# Patient Record
Sex: FEMALE | Race: WHITE | NOT HISPANIC OR LATINO | Employment: OTHER | URBAN - METROPOLITAN AREA
[De-identification: names, ages, dates, MRNs, and addresses within clinical notes are randomized per-mention and may not be internally consistent; named-entity substitution may affect disease eponyms.]

---

## 2024-04-03 ENCOUNTER — HOSPITAL ENCOUNTER (INPATIENT)
Facility: HOSPITAL | Age: 64
LOS: 1 days | Discharge: HOME | DRG: 180 | End: 2024-04-06
Attending: EMERGENCY MEDICINE | Admitting: INTERNAL MEDICINE
Payer: COMMERCIAL

## 2024-04-03 ENCOUNTER — APPOINTMENT (OUTPATIENT)
Dept: GENERAL RADIOLOGY | Facility: HOSPITAL | Age: 64
DRG: 180 | End: 2024-04-03
Payer: COMMERCIAL

## 2024-04-03 ENCOUNTER — APPOINTMENT (OUTPATIENT)
Dept: CT IMAGING | Facility: HOSPITAL | Age: 64
DRG: 180 | End: 2024-04-03
Payer: COMMERCIAL

## 2024-04-03 DIAGNOSIS — R91.8 LUNG MASS: Primary | ICD-10-CM

## 2024-04-03 DIAGNOSIS — R09.02 HYPOXIA: ICD-10-CM

## 2024-04-03 LAB
ALBUMIN SERPL-MCNC: 4.2 G/DL (ref 3.5–5.2)
ALBUMIN/GLOB SERPL: 1.5 G/DL
ALP SERPL-CCNC: 80 U/L (ref 39–117)
ALT SERPL W P-5'-P-CCNC: 15 U/L (ref 1–33)
ANION GAP SERPL CALCULATED.3IONS-SCNC: 9.9 MMOL/L (ref 5–15)
AST SERPL-CCNC: 22 U/L (ref 1–32)
BASOPHILS # BLD AUTO: 0.02 10*3/MM3 (ref 0–0.2)
BASOPHILS NFR BLD AUTO: 0.2 % (ref 0–1.5)
BILIRUB SERPL-MCNC: 0.6 MG/DL (ref 0–1.2)
BUN SERPL-MCNC: 19 MG/DL (ref 8–23)
BUN/CREAT SERPL: 18.1 (ref 7–25)
CALCIUM SPEC-SCNC: 9.5 MG/DL (ref 8.6–10.5)
CHLORIDE SERPL-SCNC: 102 MMOL/L (ref 98–107)
CO2 SERPL-SCNC: 22.1 MMOL/L (ref 22–29)
CREAT SERPL-MCNC: 1.05 MG/DL (ref 0.57–1)
DEPRECATED RDW RBC AUTO: 46.2 FL (ref 37–54)
EGFRCR SERPLBLD CKD-EPI 2021: 60.2 ML/MIN/1.73
EOSINOPHIL # BLD AUTO: 0.04 10*3/MM3 (ref 0–0.4)
EOSINOPHIL NFR BLD AUTO: 0.4 % (ref 0.3–6.2)
ERYTHROCYTE [DISTWIDTH] IN BLOOD BY AUTOMATED COUNT: 14 % (ref 12.3–15.4)
FLUAV SUBTYP SPEC NAA+PROBE: NOT DETECTED
FLUBV RNA ISLT QL NAA+PROBE: NOT DETECTED
GLOBULIN UR ELPH-MCNC: 2.8 GM/DL
GLUCOSE SERPL-MCNC: 131 MG/DL (ref 65–99)
HCT VFR BLD AUTO: 41.3 % (ref 34–46.6)
HGB BLD-MCNC: 13 G/DL (ref 12–15.9)
IMM GRANULOCYTES # BLD AUTO: 0.02 10*3/MM3 (ref 0–0.05)
IMM GRANULOCYTES NFR BLD AUTO: 0.2 % (ref 0–0.5)
LYMPHOCYTES # BLD AUTO: 0.83 10*3/MM3 (ref 0.7–3.1)
LYMPHOCYTES NFR BLD AUTO: 8.7 % (ref 19.6–45.3)
MCH RBC QN AUTO: 28 PG (ref 26.6–33)
MCHC RBC AUTO-ENTMCNC: 31.5 G/DL (ref 31.5–35.7)
MCV RBC AUTO: 89 FL (ref 79–97)
MONOCYTES # BLD AUTO: 0.58 10*3/MM3 (ref 0.1–0.9)
MONOCYTES NFR BLD AUTO: 6.1 % (ref 5–12)
NEUTROPHILS NFR BLD AUTO: 8.08 10*3/MM3 (ref 1.7–7)
NEUTROPHILS NFR BLD AUTO: 84.4 % (ref 42.7–76)
NT-PROBNP SERPL-MCNC: 146.8 PG/ML (ref 0–900)
PLATELET # BLD AUTO: 192 10*3/MM3 (ref 140–450)
PMV BLD AUTO: 9.6 FL (ref 6–12)
POTASSIUM SERPL-SCNC: 3.9 MMOL/L (ref 3.5–5.2)
PROT SERPL-MCNC: 7 G/DL (ref 6–8.5)
RBC # BLD AUTO: 4.64 10*6/MM3 (ref 3.77–5.28)
RSV RNA NPH QL NAA+NON-PROBE: NOT DETECTED
SARS-COV-2 RNA RESP QL NAA+PROBE: NOT DETECTED
SODIUM SERPL-SCNC: 134 MMOL/L (ref 136–145)
TROPONIN T SERPL HS-MCNC: 13 NG/L
WBC NRBC COR # BLD AUTO: 9.57 10*3/MM3 (ref 3.4–10.8)

## 2024-04-03 PROCEDURE — 99285 EMERGENCY DEPT VISIT HI MDM: CPT

## 2024-04-03 PROCEDURE — 93005 ELECTROCARDIOGRAM TRACING: CPT | Performed by: EMERGENCY MEDICINE

## 2024-04-03 PROCEDURE — 83880 ASSAY OF NATRIURETIC PEPTIDE: CPT | Performed by: EMERGENCY MEDICINE

## 2024-04-03 PROCEDURE — 84484 ASSAY OF TROPONIN QUANT: CPT | Performed by: EMERGENCY MEDICINE

## 2024-04-03 PROCEDURE — 94761 N-INVAS EAR/PLS OXIMETRY MLT: CPT

## 2024-04-03 PROCEDURE — 85025 COMPLETE CBC W/AUTO DIFF WBC: CPT | Performed by: EMERGENCY MEDICINE

## 2024-04-03 PROCEDURE — 71045 X-RAY EXAM CHEST 1 VIEW: CPT

## 2024-04-03 PROCEDURE — 80053 COMPREHEN METABOLIC PANEL: CPT | Performed by: EMERGENCY MEDICINE

## 2024-04-03 PROCEDURE — 71275 CT ANGIOGRAPHY CHEST: CPT

## 2024-04-03 PROCEDURE — 87637 SARSCOV2&INF A&B&RSV AMP PRB: CPT | Performed by: EMERGENCY MEDICINE

## 2024-04-03 PROCEDURE — 93010 ELECTROCARDIOGRAM REPORT: CPT | Performed by: EMERGENCY MEDICINE

## 2024-04-04 ENCOUNTER — ANESTHESIA EVENT (OUTPATIENT)
Dept: GASTROENTEROLOGY | Facility: HOSPITAL | Age: 64
End: 2024-04-04
Payer: COMMERCIAL

## 2024-04-04 PROBLEM — Z85.3 HISTORY OF BREAST CANCER: Status: ACTIVE | Noted: 2024-04-04

## 2024-04-04 PROBLEM — R06.00 DYSPNEA: Status: ACTIVE | Noted: 2024-04-04

## 2024-04-04 PROBLEM — R91.8 LUNG MASS: Status: ACTIVE | Noted: 2024-04-03

## 2024-04-04 PROBLEM — R50.9 FEVER: Status: ACTIVE | Noted: 2024-04-04

## 2024-04-04 PROBLEM — R93.89 ABNORMAL CT OF THE CHEST: Status: ACTIVE | Noted: 2024-04-04

## 2024-04-04 LAB
ANION GAP SERPL CALCULATED.3IONS-SCNC: 11 MMOL/L (ref 5–15)
BUN SERPL-MCNC: 16 MG/DL (ref 8–23)
BUN/CREAT SERPL: 15.8 (ref 7–25)
CALCIUM SPEC-SCNC: 8.9 MG/DL (ref 8.6–10.5)
CHLORIDE SERPL-SCNC: 103 MMOL/L (ref 98–107)
CO2 SERPL-SCNC: 24 MMOL/L (ref 22–29)
CREAT SERPL-MCNC: 1.01 MG/DL (ref 0.57–1)
D-LACTATE SERPL-SCNC: 1.3 MMOL/L (ref 0.5–2)
EGFRCR SERPLBLD CKD-EPI 2021: 63.1 ML/MIN/1.73
GEN 5 2HR TROPONIN T REFLEX: 13 NG/L
GLUCOSE SERPL-MCNC: 125 MG/DL (ref 65–99)
POTASSIUM SERPL-SCNC: 4 MMOL/L (ref 3.5–5.2)
QT INTERVAL: 314 MS
QTC INTERVAL: 441 MS
SODIUM SERPL-SCNC: 138 MMOL/L (ref 136–145)
TROPONIN T DELTA: 0 NG/L

## 2024-04-04 PROCEDURE — 87185 SC STD ENZYME DETCJ PER NZM: CPT | Performed by: NURSE PRACTITIONER

## 2024-04-04 PROCEDURE — 25810000003 SODIUM CHLORIDE 0.9 % SOLUTION: Performed by: EMERGENCY MEDICINE

## 2024-04-04 PROCEDURE — 83605 ASSAY OF LACTIC ACID: CPT | Performed by: NURSE PRACTITIONER

## 2024-04-04 PROCEDURE — 99223 1ST HOSP IP/OBS HIGH 75: CPT | Performed by: INTERNAL MEDICINE

## 2024-04-04 PROCEDURE — 84484 ASSAY OF TROPONIN QUANT: CPT | Performed by: EMERGENCY MEDICINE

## 2024-04-04 PROCEDURE — 99284 EMERGENCY DEPT VISIT MOD MDM: CPT | Performed by: EMERGENCY MEDICINE

## 2024-04-04 PROCEDURE — 36415 COLL VENOUS BLD VENIPUNCTURE: CPT

## 2024-04-04 PROCEDURE — 87070 CULTURE OTHR SPECIMN AEROBIC: CPT | Performed by: NURSE PRACTITIONER

## 2024-04-04 PROCEDURE — 87077 CULTURE AEROBIC IDENTIFY: CPT | Performed by: NURSE PRACTITIONER

## 2024-04-04 PROCEDURE — 87040 BLOOD CULTURE FOR BACTERIA: CPT | Performed by: NURSE PRACTITIONER

## 2024-04-04 PROCEDURE — 25510000001 IOPAMIDOL PER 1 ML: Performed by: EMERGENCY MEDICINE

## 2024-04-04 PROCEDURE — G0378 HOSPITAL OBSERVATION PER HR: HCPCS

## 2024-04-04 PROCEDURE — 87205 SMEAR GRAM STAIN: CPT | Performed by: NURSE PRACTITIONER

## 2024-04-04 PROCEDURE — 80048 BASIC METABOLIC PNL TOTAL CA: CPT | Performed by: NURSE PRACTITIONER

## 2024-04-04 RX ORDER — ANASTROZOLE 1 MG/1
1 TABLET ORAL DAILY
COMMUNITY

## 2024-04-04 RX ORDER — ACETAMINOPHEN 500 MG
1000 TABLET ORAL EVERY 8 HOURS PRN
Status: DISCONTINUED | OUTPATIENT
Start: 2024-04-04 | End: 2024-04-06 | Stop reason: HOSPADM

## 2024-04-04 RX ORDER — OXYCODONE HYDROCHLORIDE 5 MG/1
5 TABLET ORAL EVERY 6 HOURS PRN
Status: DISCONTINUED | OUTPATIENT
Start: 2024-04-04 | End: 2024-04-06 | Stop reason: HOSPADM

## 2024-04-04 RX ORDER — HYDROXYZINE HYDROCHLORIDE 25 MG/1
25 TABLET, FILM COATED ORAL ONCE
Status: COMPLETED | OUTPATIENT
Start: 2024-04-04 | End: 2024-04-04

## 2024-04-04 RX ORDER — IPRATROPIUM BROMIDE AND ALBUTEROL SULFATE 2.5; .5 MG/3ML; MG/3ML
3 SOLUTION RESPIRATORY (INHALATION) ONCE
Status: COMPLETED | OUTPATIENT
Start: 2024-04-04 | End: 2024-04-04

## 2024-04-04 RX ORDER — IPRATROPIUM BROMIDE AND ALBUTEROL SULFATE 2.5; .5 MG/3ML; MG/3ML
3 SOLUTION RESPIRATORY (INHALATION) EVERY 4 HOURS PRN
Status: DISCONTINUED | OUTPATIENT
Start: 2024-04-04 | End: 2024-04-06 | Stop reason: HOSPADM

## 2024-04-04 RX ADMIN — ACETAMINOPHEN 1000 MG: 500 TABLET, FILM COATED ORAL at 04:26

## 2024-04-04 RX ADMIN — HYDROXYZINE HYDROCHLORIDE 25 MG: 25 TABLET, FILM COATED ORAL at 04:26

## 2024-04-04 RX ADMIN — IPRATROPIUM BROMIDE AND ALBUTEROL SULFATE 3 ML: .5; 3 SOLUTION RESPIRATORY (INHALATION) at 01:05

## 2024-04-04 RX ADMIN — SODIUM CHLORIDE 1000 ML: 9 INJECTION, SOLUTION INTRAVENOUS at 00:20

## 2024-04-04 RX ADMIN — ACETAMINOPHEN 1000 MG: 500 TABLET, FILM COATED ORAL at 12:40

## 2024-04-04 RX ADMIN — IOPAMIDOL 100 ML: 755 INJECTION, SOLUTION INTRAVENOUS at 00:09

## 2024-04-04 RX ADMIN — ACETAMINOPHEN 1000 MG: 500 TABLET, FILM COATED ORAL at 21:36

## 2024-04-04 NOTE — ED NOTES
Patient would like to discontinue the fluids because she does not want to keep getting unhooked to go use the bathroom.  Provider has been informed.

## 2024-04-04 NOTE — CONSULTS
Group: [facility]         CONSULT NOTE    Patient Identification:  Tracy Reddy  63 y.o.  female  1960  [id number]            Requesting physician: Attending physician    Reason for Consultation:  lung mass        History of Present Illness:    63-year-old female admitted on 4/3/2024 with fever of 103 and productive cough of green sputum,  CT chest showed large right hilar mass suspicious for malignancy  History of bilateral ductal carcinoma status post bilateral mastectomy 2020 status post reconstruction 2021  History of pulmonary embolism      Assessment:    Right hilar mass suspicious for malignancy  History of bilateral ductal carcinoma status post bilateral mastectomy 2020 status post reconstruction 2021  History of pulmonary embolism      Recommendations:      EBUS bronchoscopy in a.m.     Check PT INR    Review of Sytems:  Review of Systems   Respiratory:  Positive for cough and shortness of breath. Negative for wheezing and stridor.    Cardiovascular:  Negative for chest pain, palpitations and leg swelling.       Past Medical History:  Past Medical History:   Diagnosis Date    Cancer        Past Surgical History:  Past Surgical History:   Procedure Laterality Date    MASTECTOMY          Home Meds:  Medications Prior to Admission   Medication Sig Dispense Refill Last Dose    anastrozole (ARIMIDEX) 1 MG tablet Take 1 tablet by mouth Daily.   4/3/2024       Allergies:  No Known Allergies    Social History:   Social History     Socioeconomic History    Marital status:    Tobacco Use    Smoking status: Never     Passive exposure: Never    Smokeless tobacco: Never   Vaping Use    Vaping status: Never Used   Substance and Sexual Activity    Alcohol use: Never    Drug use: Never    Sexual activity: Defer       Family History:  History reviewed. No pertinent family history.    Physical Exam:  /71 (BP Location: Right arm, Patient Position: Lying)   Pulse (!) 123   Temp 100.5 °F  "(38.1 °C) (Oral)   Resp (!) 32   Ht 162.6 cm (64\")   Wt 87.7 kg (193 lb 6.4 oz)   SpO2 94%   BMI 33.20 kg/m²  Body mass index is 33.2 kg/m². 94% 87.7 kg (193 lb 6.4 oz)  Physical Exam  Cardiovascular:      Heart sounds: Murmur heard.      No gallop.   Pulmonary:      Effort: No respiratory distress.      Breath sounds: No stridor. Rhonchi and rales present. No wheezing.   Chest:      Chest wall: No tenderness.         LABS:  Lab Results   Component Value Date    CALCIUM 9.5 04/03/2024     Results from last 7 days   Lab Units 04/03/24  2251   SODIUM mmol/L 134*   POTASSIUM mmol/L 3.9   CHLORIDE mmol/L 102   CO2 mmol/L 22.1   BUN mg/dL 19   CREATININE mg/dL 1.05*   GLUCOSE mg/dL 131*   CALCIUM mg/dL 9.5   WBC 10*3/mm3 9.57   HEMOGLOBIN g/dL 13.0   PLATELETS 10*3/mm3 192   ALT (SGPT) U/L 15   AST (SGOT) U/L 22   PROBNP pg/mL 146.8     Lab Results   Component Value Date    TROPONINT 13 04/04/2024     Results from last 7 days   Lab Units 04/04/24  0044 04/03/24  2251   HSTROP T ng/L 13 13         Results from last 7 days   Lab Units 04/04/24  0506   LACTATE mmol/L 1.3         Results from last 7 days   Lab Units 04/03/24  2311 04/03/24  2237   INFLUENZA B PCR   --  Not Detected   INFLUENZA A PCR   --  Not Detected   RSV, PCR  Not Detected  --              No results found for: \"TSH\"  Estimated Creatinine Clearance: 59.5 mL/min (A) (by C-G formula based on SCr of 1.05 mg/dL (H)).         Imaging:  Imaging Results (Last 24 Hours)       Procedure Component Value Units Date/Time    CT Angiogram Chest Pulmonary Embolism [[id number]] Collected: 04/04/24 0013     Updated: 04/04/24 0017    Narrative:      CT ANGIOGRAM CHEST PULMONARY EMBOLISM    Date of Exam: 4/3/2024 11:51 PM EDT    Indication: Pulmonary embolism (PE) suspected, unknown D-dimer.    Comparison: 4/3/2024.    Technique: Axial CT images were obtained of the chest after the uneventful intravenous administration of iodinated contrast utilizing pulmonary embolism " protocol.  Sagittal and coronal reconstructions were performed.  Automated exposure control and   iterative reconstruction methods were used.      Findings:    Pulmonary arteries: Adequate opacification of the pulmonary arteries. No evidence of acute pulmonary embolism.    Lungs and Pleura: There is a mass present within the medial right lower lobe measuring up to 3.2 x 4.1 cm (series 4/72). This extends to the right hilar region. Additional conglomerate of nodes is present within the right hilar to right suprahilar region   measuring up to 2.7 x 1.8 cm (series formers 60). No additional adenopathy identified..    Mediastinum/Radha: No mediastinal or hilar lymphadenopathy.    Lymph nodes: No axillary or supraclavicular adenopathy.    Cardiovascular: The cardiac chambers are within normal limits. The pericardium is normal. The aorta and its arch branch vessels are unremarkable.   There is a right subclavian Mediport with the tip in the mid SVC.    Upper Abdomen: The upper abdominal contents are unremarkable.          Bones and Soft Tissue: No suspicious osseous lesion.        Impression:      Impression:    1. No evidence of pulmonary embolism.  2. No acute cardiopulmonary process. There is a mass present within the right lower lobe likely representing malignancy. There is enlarged right hilar adenopathy likely representing local metastatic disease. No previous imaging available for comparison.  3. Ancillary findings as described above.        Electronically Signed: Martina Benites MD    4/4/2024 12:15 AM EDT    Workstation ID: [id number]    XR Chest 1 View [[id number]] Collected: 04/03/24 2322     Updated: 04/03/24 2325    Narrative:      XR CHEST 1 VW    Date of Exam: 4/3/2024 11:11 PM EDT    Indication: cough    Comparison: None available.    Findings:  Masslike opacity present within the right lower lobe. Right subclavian Mediport present with tip in the mid SVC. Mild patchy airspace changes are present within the  bilateral lower lobes. No significant pleural effusion. No pneumothorax. No acute osseous   abnormality identified.      Impression:      Impression:  No previous imaging available for comparison. Masslike opacity present within the right lower lobe. Malignancy cannot be excluded.      Electronically Signed: Martina Benites MD    4/3/2024 11:23 PM EDT    Workstation ID: [id number]              Current Meds:   SCHEDULE  sodium chloride, 1,000 mL, Intravenous, Once      Infusions     PRNs    acetaminophen    ipratropium-albuterol    oxyCODONE        Edy Irving MD  4/4/2024  06:13 EDT      Much of this encounter note is an electronic transcription/translation of spoken language to printed text using Dragon Software.

## 2024-04-04 NOTE — PLAN OF CARE
Goal Outcome Evaluation:               Bronch tomorrow, 4/5, NPO at 0000

## 2024-04-04 NOTE — H&P
[facility] Medicine Services  History & Physical    Patient Name: Tracy Reddy  : 1960  MRN: [id number]  Primary Care Physician:  Provider, No Known  Date of admission: 4/3/2024  Date and Time of Service: 4/3/2024 at 0410    Subjective      Chief Complaint: shortness of breath     History of Present Illness: Tracy Reddy is a very pleasant  63 y.o. female with a CMH of bilateral anterior ductal carcinoma diagnosed in  status post bilateral mastectomy with reconstruction in , status post neoadjuvant chemotherapy and radiation currently on Arimidex who presented to [facility] emergency department on 4/3/2024 with complaints of fever up to 103, cough productive of brown and green sputum, shortness of air and chest tightness.for the past several days . She reports family members have been sick at home.  She reports she had a portable pulse oximeter at home was reading 87%.  She reports she had a history of a PE in the past.  The records shows she just saw her oncologist at [facility] on 2024.  She is awake and alert and answers appropriately.  She is currently stable on 2 L oxygen via nasal cannula.  She does not use oxygen at home.  She is a non-smoker.    Labs showed a troponin was 13 x 2, proBNP 146.8 sodium 134 creatinine 1.05 glucose 131 WBC was not elevated, COVID-19 was negative flu a was negative flu B was negative.  Chest x-ray per radiology today showed masslike opacity present within the right lower lobe malignancy cannot be excluded.  CT angiogram of chest today showed no evidence of pulmonary embolism no acute cardiopulmonary process however there was a mass present within the right lower lobe likely representing malignancy per radiology.  It also mention there is a large right hilar adenopathy likely representing local metastatic disease with no previous imaging for comparison.    Given a DuoNeb  treatment in the emergency department and 1 L fluid bolus.  Sputum culture has been ordered and is pending.  She triggered sepsis for fever and tachycardia however WBC is not elevated.  She is not hypotensive.  Blood cultures ordered.  Lactic ordered and pending.  Oncology hematology and pulmonary have been consulted for further evaluation.    Review of Systems   Constitutional:  Positive for fever.   HENT: Negative.     Eyes: Negative.    Respiratory:  Positive for cough, chest tightness and shortness of breath.    Cardiovascular: Negative.    Gastrointestinal: Negative.    Endocrine: Negative.    Genitourinary: Negative.    Musculoskeletal: Negative.    Skin: Negative.    Allergic/Immunologic: Negative.    Neurological: Negative.    Hematological: Negative.    Psychiatric/Behavioral: Negative.     All other systems reviewed and are negative.      Personal History     Past Medical History:   Diagnosis Date    Cancer        Past Surgical History:   Procedure Laterality Date    MASTECTOMY         Family History: family history is not on file. Otherwise pertinent FHx was reviewed and not pertinent to current issue.    Social History:  reports that she has never smoked. She has never been exposed to tobacco smoke. She has never used smokeless tobacco. She reports that she does not drink alcohol and does not use drugs.    Home Medications:  Prior to Admission Medications       None              Allergies:  No Known Allergies    Objective      Vitals:   Temp:  [97.8 °F (36.6 °C)-100.5 °F (38.1 °C)] 100.5 °F (38.1 °C)  Heart Rate:  [110-129] 123  Resp:  [15-32] 32  BP: (109-172)/() 144/71  Body mass index is 33.2 kg/m².  Physical Exam  Vitals reviewed.   Constitutional:       Appearance: Normal appearance. She is obese.   HENT:      Head: Normocephalic and atraumatic.      Right Ear: External ear normal.      Left Ear: External ear normal.      Nose: Nose normal.      Mouth/Throat:      Mouth: Mucous membranes are  moist.   Eyes:      Extraocular Movements: Extraocular movements intact.   Cardiovascular:      Rate and Rhythm: Regular rhythm. Tachycardia present.      Pulses: Normal pulses.      Heart sounds: Normal heart sounds.   Pulmonary:      Effort: Pulmonary effort is normal.      Breath sounds: Normal breath sounds.   Abdominal:      Palpations: Abdomen is soft.   Genitourinary:     Comments: deferred  Musculoskeletal:         General: Normal range of motion.      Cervical back: Normal range of motion and neck supple.   Skin:     General: Skin is warm and dry.   Neurological:      General: No focal deficit present.      Mental Status: She is alert and oriented to person, place, and time.   Psychiatric:         Mood and Affect: Mood normal.         Behavior: Behavior normal.         Thought Content: Thought content normal.         Judgment: Judgment normal.         Diagnostic Data:  Lab Results (last 24 hours)       Procedure Component Value Units Date/Time    High Sensitivity Troponin T 2Hr [[id number]]  (Normal) Collected: 04/04/24 0044    Specimen: Blood from Arm, Left Updated: 04/04/24 0102     HS Troponin T 13 ng/L      Troponin T Delta 0 ng/L     Narrative:      High Sensitive Troponin T Reference Range:  <14.0 ng/L- Negative Female for AMI  <22.0 ng/L- Negative Male for AMI  >=14 - Abnormal Female indicating possible myocardial injury.  >=22 - Abnormal Male indicating possible myocardial injury.   Clinicians would have to utilize clinical acumen, EKG, Troponin, and serial changes to determine if it is an Acute Myocardial Infarction or myocardial injury due to an underlying chronic condition.         RSV PCR - Swab, Nasopharynx [[id number]]  (Normal) Collected: 04/03/24 2311    Specimen: Swab from Nasopharynx Updated: 04/03/24 2341     RSV, PCR Not Detected    BNP [[id number]]  (Normal) Collected: 04/03/24 2251    Specimen: Blood from Arm, Left Updated: 04/03/24 2333     proBNP 146.8 pg/mL     Narrative:      This  assay is used as an aid in the diagnosis of individuals suspected of having heart failure. It can be used as an aid in the diagnosis of acute decompensated heart failure (ADHF) in patients presenting with signs and symptoms of ADHF to the emergency department (ED). In addition, NT-proBNP of <300 pg/mL indicates ADHF is not likely.    Age Range Result Interpretation  NT-proBNP Concentration (pg/mL:      <50             Positive            >450                   Gray                 300-450                    Negative             <300    50-75           Positive            >900                  Gray                300-900                  Negative            <300      >75             Positive            >1800                  Gray                300-1800                  Negative            <300    High Sensitivity Troponin T [[id number]]  (Normal) Collected: 04/03/24 2251    Specimen: Blood from Arm, Left Updated: 04/03/24 2324     HS Troponin T 13 ng/L     Narrative:      High Sensitive Troponin T Reference Range:  <14.0 ng/L- Negative Female for AMI  <22.0 ng/L- Negative Male for AMI  >=14 - Abnormal Female indicating possible myocardial injury.  >=22 - Abnormal Male indicating possible myocardial injury.   Clinicians would have to utilize clinical acumen, EKG, Troponin, and serial changes to determine if it is an Acute Myocardial Infarction or myocardial injury due to an underlying chronic condition.         Comprehensive Metabolic Panel [[id number]]  (Abnormal) Collected: 04/03/24 2251    Specimen: Blood from Arm, Left Updated: 04/03/24 2317     Glucose 131 mg/dL      BUN 19 mg/dL      Creatinine 1.05 mg/dL      Sodium 134 mmol/L      Potassium 3.9 mmol/L      Comment: Slight hemolysis detected by analyzer. Result may be falsely elevated.        Chloride 102 mmol/L      CO2 22.1 mmol/L      Calcium 9.5 mg/dL      Total Protein 7.0 g/dL      Albumin 4.2 g/dL      ALT (SGPT) 15 U/L      AST (SGOT) 22 U/L       Alkaline Phosphatase 80 U/L      Total Bilirubin 0.6 mg/dL      Globulin 2.8 gm/dL      A/G Ratio 1.5 g/dL      BUN/Creatinine Ratio 18.1     Anion Gap 9.9 mmol/L      eGFR 60.2 mL/min/1.73     Narrative:      GFR Normal >60  Chronic Kidney Disease <60  Kidney Failure <15      COVID-19 and FLU A/B PCR, 1 HR TAT - Swab, Nasopharynx [[id number]]  (Normal) Collected: 04/03/24 2237    Specimen: Swab from Nasopharynx Updated: 04/03/24 2301     COVID19 Not Detected     Influenza A PCR Not Detected     Influenza B PCR Not Detected    Narrative:      Fact sheet for providers: https://www.fda.gov/media/665421/download    Fact sheet for patients: https://www.fda.gov/media/475799/download    Test performed by PCR.    CBC & Differential [[id number]]  (Abnormal) Collected: 04/03/24 2251    Specimen: Blood from Arm, Left Updated: 04/03/24 2257    Narrative:      The following orders were created for panel order CBC & Differential.  Procedure                               Abnormality         Status                     ---------                               -----------         ------                     CBC Auto Differential[[id number]]        Abnormal            Final result                 Please view results for these tests on the individual orders.    CBC Auto Differential [[id number]]  (Abnormal) Collected: 04/03/24 2251    Specimen: Blood from Arm, Left Updated: 04/03/24 2257     WBC 9.57 10*3/mm3      RBC 4.64 10*6/mm3      Hemoglobin 13.0 g/dL      Hematocrit 41.3 %      MCV 89.0 fL      MCH 28.0 pg      MCHC 31.5 g/dL      RDW 14.0 %      RDW-SD 46.2 fl      MPV 9.6 fL      Platelets 192 10*3/mm3      Neutrophil % 84.4 %      Lymphocyte % 8.7 %      Monocyte % 6.1 %      Eosinophil % 0.4 %      Basophil % 0.2 %      Immature Grans % 0.2 %      Neutrophils, Absolute 8.08 10*3/mm3      Lymphocytes, Absolute 0.83 10*3/mm3      Monocytes, Absolute 0.58 10*3/mm3      Eosinophils, Absolute 0.04 10*3/mm3      Basophils, Absolute  0.02 10*3/mm3      Immature Grans, Absolute 0.02 10*3/mm3              Imaging Results (Last 24 Hours)       Procedure Component Value Units Date/Time    CT Angiogram Chest Pulmonary Embolism [[id number]] Collected: 04/04/24 0013     Updated: 04/04/24 0017    Narrative:      CT ANGIOGRAM CHEST PULMONARY EMBOLISM    Date of Exam: 4/3/2024 11:51 PM EDT    Indication: Pulmonary embolism (PE) suspected, unknown D-dimer.    Comparison: 4/3/2024.    Technique: Axial CT images were obtained of the chest after the uneventful intravenous administration of iodinated contrast utilizing pulmonary embolism protocol.  Sagittal and coronal reconstructions were performed.  Automated exposure control and   iterative reconstruction methods were used.      Findings:    Pulmonary arteries: Adequate opacification of the pulmonary arteries. No evidence of acute pulmonary embolism.    Lungs and Pleura: There is a mass present within the medial right lower lobe measuring up to 3.2 x 4.1 cm (series 4/72). This extends to the right hilar region. Additional conglomerate of nodes is present within the right hilar to right suprahilar region   measuring up to 2.7 x 1.8 cm (series formers 60). No additional adenopathy identified..    Mediastinum/Radha: No mediastinal or hilar lymphadenopathy.    Lymph nodes: No axillary or supraclavicular adenopathy.    Cardiovascular: The cardiac chambers are within normal limits. The pericardium is normal. The aorta and its arch branch vessels are unremarkable.   There is a right subclavian Mediport with the tip in the mid SVC.    Upper Abdomen: The upper abdominal contents are unremarkable.          Bones and Soft Tissue: No suspicious osseous lesion.        Impression:      Impression:    1. No evidence of pulmonary embolism.  2. No acute cardiopulmonary process. There is a mass present within the right lower lobe likely representing malignancy. There is enlarged right hilar adenopathy likely representing local  metastatic disease. No previous imaging available for comparison.  3. Ancillary findings as described above.        Electronically Signed: Martina Benites MD    4/4/2024 12:15 AM EDT    Workstation ID: [id number]    XR Chest 1 View [[id number]] Collected: 04/03/24 2322     Updated: 04/03/24 2325    Narrative:      XR CHEST 1 VW    Date of Exam: 4/3/2024 11:11 PM EDT    Indication: cough    Comparison: None available.    Findings:  Masslike opacity present within the right lower lobe. Right subclavian Mediport present with tip in the mid SVC. Mild patchy airspace changes are present within the bilateral lower lobes. No significant pleural effusion. No pneumothorax. No acute osseous   abnormality identified.      Impression:      Impression:  No previous imaging available for comparison. Masslike opacity present within the right lower lobe. Malignancy cannot be excluded.      Electronically Signed: Martina Benites MD    4/3/2024 11:23 PM EDT    Workstation ID: [id number]              Assessment & Plan        This is a 63 y.o. female with:    Active and Resolved Problems  Active Hospital Problems    Diagnosis  POA    **Abnormal CT of the chest [R93.89]  Yes     Priority: Medium    Dyspnea [R06.00]  Yes     Priority: High    Fever [R50.9]  Yes     Priority: Medium    History of breast cancer [Z85.3]  Not Applicable      Resolved Hospital Problems   No resolved problems to display.     Abnormal CT of the chest per radiology concerning for malignancy with a past medical history of breast cancer, oncology hematology consulted, pulmonary consulted for possible biopsy    Dyspnea, likely secondary to above, WBC not elevated, no acute process on chest x-ray per radiology, sputum culture ordered and pending stable on 2 L oxygen via nasal cannula DuoNebs every 4 hours as needed shortness of air or wheezing    Fever, acetaminophen 1000 mg every 6 hours as needed    History of breast cancer reports on Arimidex      DVT  prophylaxis:  Mechanical DVT prophylaxis orders are present.        The patient desires to be as follows:    CODE STATUS:    Code Status (Patient has no pulse and is not breathing): CPR (Attempt to Resuscitate)  Medical Interventions (Patient has pulse or is breathing): Full Support          Admission Status:  I believe this patient meets observation status.    Expected Length of Stay: pending further evaluation by pulmonary and oncology     PDMP and Medication Dispenses via Sidebar reviewed and consistent with patient reported medications.    I discussed the patient's findings and my recommendations with patient and family.      Signature:     This document has been electronically signed by YESSI Johnson on April 4, 2024 04:57 EDT   [facility] Hospitalist Team

## 2024-04-04 NOTE — FSED PROVIDER NOTE
Subjective   History of Present Illness  63 yof complains of cough, shortness of  breath and feeling fullness in her chest. She reports her symptoms just started over the past few days. She denies fever, chills, or sick contacts. She denies h/o chronic lung diseased. Pt had a portable pulse ox at home that was reading 87%. The patient has had h/o PE in the past and she reports that this feels similar. She does have history of Invasive Ductal Carcinoma of the breast. This was diagnosed in 2020. She completed neoadjuvant chemotherapy. Bilateral mastectomy with implant reconstruction on 1/15/2021. She then started on Arimidex. Completed left chest wall and regional node radiation. The patient saw her oncologist on March 11th 2024.       Review of Systems   Constitutional: Negative.    HENT:  Positive for congestion.    Respiratory:  Positive for cough and shortness of breath.    Cardiovascular: Negative.    Gastrointestinal: Negative.    Musculoskeletal: Negative.    Skin: Negative.    All other systems reviewed and are negative.    No past medical history on file.    No Known Allergies    No past surgical history on file.    No family history on file.    Social History     Socioeconomic History   • Marital status:            Objective   Physical Exam  Vitals reviewed.   Constitutional:       General: She is in acute distress.   HENT:      Head: Normocephalic and atraumatic.      Nose: Nose normal.      Mouth/Throat:      Mouth: Mucous membranes are moist.      Pharynx: Oropharynx is clear.   Eyes:      Extraocular Movements: Extraocular movements intact.      Pupils: Pupils are equal, round, and reactive to light.   Cardiovascular:      Rate and Rhythm: Tachycardia present.      Pulses: Normal pulses.      Heart sounds: Normal heart sounds.   Pulmonary:      Effort: Pulmonary effort is normal.      Breath sounds: Decreased air movement present.   Abdominal:      Palpations: Abdomen is soft.      Tenderness:  There is no abdominal tenderness.   Musculoskeletal:         General: No swelling. Normal range of motion.      Cervical back: Normal range of motion and neck supple.   Skin:     General: Skin is warm and dry.      Capillary Refill: Capillary refill takes less than 2 seconds.   Neurological:      Mental Status: She is alert.     ECG 12 Lead      Date/Time: 4/4/2024 2:16 AM    Performed by: Jovanna Gama MD  Authorized by: Jovanna Gama MD  Previous ECG: no previous ECG available  Rhythm: sinus tachycardia  Rate: tachycardic  BPM: 119  Clinical impression: non-specific ECG and low voltage  Comments: Non specific St changes           ED Course  ED Course as of 04/04/24 0205   Thu Apr 04, 2024   0101 Paged patient's oncologist Dr Farr.  [BM]   0149 Pt has decieded they do not want to go to [facility] where here oncologist is, and request to be admitted to [place].  [BM]      ED Course User Index  [BM] Jovanna Gama MD                                           Medical Decision Making  Problems Addressed:  Hypoxia: complicated acute illness or injury  Lung mass: complicated acute illness or injury    Amount and/or Complexity of Data Reviewed  Labs: ordered.  Radiology: ordered.  ECG/medicine tests: ordered.    Risk  Prescription drug management.  Decision regarding hospitalization.        Final diagnoses:   Lung mass   Hypoxia       ED Disposition  ED Disposition       ED Disposition   Decision to Admit    Condition   --    Comment   Level of Care: Telemetry [5]   Admitting Physician: SAMARIA BOYCE [[id number]]   Attending Physician: JOVANNA GAMA [[id number]]   Patient Class: Observation [104]                 No follow-up provider specified.       Medication List      No changes were made to your prescriptions during this visit.

## 2024-04-04 NOTE — CONSULTS
Hematology/Oncology Inpatient Consultation    Patient name: Tracy Reddy  : 1960  MRN: [id number]  Referring Provider: YESSI Johnson  Reason for Consultation: History of breast cancer, abnormal chest CT    Chief complaint: Productive cough    History of present illness:    Tracy Reddy is a 63 y.o. female who presented to [facility] on 4/3/2024 with complaints of cough.  She initially presented to [facility] ED with complaints of fever up to 103 °F, productive cough with green and brown sputum, shortness of air and chest tightness that has been ongoing for several days.  She reports she has family members that have been sick at home.  She reports she has also had a history of PE in the past.  Labs show troponin was 13 x 2, .8.  CBC unremarkable.  Respiratory panel was negative. Chest x-ray showed masslike opacity within the right lower lobe.  Sputum cultures and blood cultures have been ordered.  She is a non-smoker.  Pulmonology also has been consulted.    24 CT chest angiogram  Impression:  1. No evidence of pulmonary embolism.  2. No acute cardiopulmonary process. There is a mass present within the right lower lobe likely representing malignancy. There is enlarged right hilar adenopathy likely representing local metastatic disease. No previous imaging available for comparison.      24  Hematology/Oncology was consulted.      From record review: Patient has a history of bilateral ductal carcinoma diagnosed in .  Right breast IDC, G1, ER positive, SC positive, HER-2 negative. Left breast IDC, G2, ER positive, SC positive, HER-2 negative. Left SLND revealed 1 of 2 lymph nodes positive for macro metastasis. She is status post bilateral mastectomy with reconstruction in .  She is also completed neoadjuvant chemotherapy with 6 cycles of TCHP.  Taxotere and carboplatin with dose reductions at cycle 2 secondary to chemotherapy-induced nausea  and vomiting.  Completed adjuvant Herceptin Perjeta for 1 year.  Status post radiation therapy left chest wall and regional nodes, 5 fractions for a total dose of 5000 cGray. Current treatment with hormone blockade with Arimidex.  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  She follows at [facility], reviewed most recent office note from 3/11/2024.    He/She  has a past medical history of Cancer.    PCP: Provider, No Known    History:  Past Medical History:   Diagnosis Date    Cancer    ,   Past Surgical History:   Procedure Laterality Date    MASTECTOMY     , History reviewed. No pertinent family history.,   Social History     Tobacco Use    Smoking status: Never     Passive exposure: Never    Smokeless tobacco: Never   Vaping Use    Vaping status: Never Used   Substance Use Topics    Alcohol use: Never    Drug use: Never   ,   Medications Prior to Admission   Medication Sig Dispense Refill Last Dose    anastrozole (ARIMIDEX) 1 MG tablet Take 1 tablet by mouth Daily.   4/3/2024   , Scheduled Meds:  sodium chloride, 1,000 mL, Intravenous, Once    , Continuous Infusions:   , PRN Meds:    acetaminophen    ipratropium-albuterol    oxyCODONE   Allergies:  Patient has no known allergies.    Subjective     ROS:  Review of Systems   Constitutional:  Positive for fatigue. Negative for chills and fever.   HENT:  Negative for congestion, ear pain, mouth sores, nosebleeds and sore throat.    Eyes:  Negative for photophobia and visual disturbance.   Respiratory:  Positive for cough and shortness of breath. Negative for chest tightness, wheezing and stridor.    Cardiovascular:  Negative for chest pain and palpitations.   Gastrointestinal:  Negative for abdominal pain, diarrhea, nausea and vomiting.   Endocrine: Negative for cold intolerance and heat intolerance.   Genitourinary:  Negative for dysuria and hematuria.   Musculoskeletal:  Negative for joint swelling and neck stiffness.   Skin:  Negative for  "color change and rash.   Neurological:  Negative for seizures and syncope.   Hematological:  Negative for adenopathy.   Psychiatric/Behavioral:  Negative for agitation, confusion and hallucinations.         Objective   Vital Signs:   /61 (BP Location: Right leg, Patient Position: Lying)   Pulse (!) 123   Temp 98.7 °F (37.1 °C) (Oral)   Resp 22   Ht 162.6 cm (64\")   Wt 87.7 kg (193 lb 6.4 oz)   SpO2 94%   BMI 33.20 kg/m²     Physical Exam: (performed by MD)  Physical Exam  Constitutional:       Appearance: Normal appearance.   HENT:      Head: Normocephalic and atraumatic.   Eyes:      Pupils: Pupils are equal, round, and reactive to light.   Cardiovascular:      Rate and Rhythm: Normal rate and regular rhythm.      Pulses: Normal pulses.      Heart sounds: No murmur heard.  Pulmonary:      Effort: Pulmonary effort is normal.      Breath sounds: Rhonchi present.   Abdominal:      General: There is no distension.      Palpations: Abdomen is soft. There is no mass.      Tenderness: There is no abdominal tenderness.   Musculoskeletal:         General: Normal range of motion.      Cervical back: Normal range of motion.   Skin:     General: Skin is warm.   Neurological:      General: No focal deficit present.      Mental Status: She is alert.   Psychiatric:         Mood and Affect: Mood normal.         Results Review:  Lab Results (last 48 hours)       Procedure Component Value Units Date/Time    Basic Metabolic Panel [[id number]]  (Abnormal) Collected: 04/04/24 0606    Specimen: Blood from Arm, Left Updated: 04/04/24 0706     Glucose 125 mg/dL      BUN 16 mg/dL      Creatinine 1.01 mg/dL      Sodium 138 mmol/L      Potassium 4.0 mmol/L      Chloride 103 mmol/L      CO2 24.0 mmol/L      Calcium 8.9 mg/dL      BUN/Creatinine Ratio 15.8     Anion Gap 11.0 mmol/L      eGFR 63.1 mL/min/1.73     Narrative:      GFR Normal >60  Chronic Kidney Disease <60  Kidney Failure <15      Lactic Acid, Plasma [[id number]]  " (Normal) Collected: 04/04/24 0506    Specimen: Blood from Arm, Left Updated: 04/04/24 0549     Lactate 1.3 mmol/L     Blood Culture - Blood, Arm, Right [[id number]] Collected: 04/04/24 0501    Specimen: Blood from Arm, Right Updated: 04/04/24 0515    Blood Culture - Blood, Arm, Left [[id number]] Collected: 04/04/24 0446    Specimen: Blood from Arm, Left Updated: 04/04/24 0515    High Sensitivity Troponin T 2Hr [[id number]]  (Normal) Collected: 04/04/24 0044    Specimen: Blood from Arm, Left Updated: 04/04/24 0102     HS Troponin T 13 ng/L      Troponin T Delta 0 ng/L     Narrative:      High Sensitive Troponin T Reference Range:  <14.0 ng/L- Negative Female for AMI  <22.0 ng/L- Negative Male for AMI  >=14 - Abnormal Female indicating possible myocardial injury.  >=22 - Abnormal Male indicating possible myocardial injury.   Clinicians would have to utilize clinical acumen, EKG, Troponin, and serial changes to determine if it is an Acute Myocardial Infarction or myocardial injury due to an underlying chronic condition.         RSV PCR - Swab, Nasopharynx [[id number]]  (Normal) Collected: 04/03/24 2311    Specimen: Swab from Nasopharynx Updated: 04/03/24 2341     RSV, PCR Not Detected    BNP [[id number]]  (Normal) Collected: 04/03/24 2251    Specimen: Blood from Arm, Left Updated: 04/03/24 2333     proBNP 146.8 pg/mL     Narrative:      This assay is used as an aid in the diagnosis of individuals suspected of having heart failure. It can be used as an aid in the diagnosis of acute decompensated heart failure (ADHF) in patients presenting with signs and symptoms of ADHF to the emergency department (ED). In addition, NT-proBNP of <300 pg/mL indicates ADHF is not likely.    Age Range Result Interpretation  NT-proBNP Concentration (pg/mL:      <50             Positive            >450                   Gray                 300-450                    Negative             <300    50-75           Positive            >900                   West                300-900                  Negative            <300      >75             Positive            >1800                  Gray                300-1800                  Negative            <300    High Sensitivity Troponin T [[id number]]  (Normal) Collected: 04/03/24 2251    Specimen: Blood from Arm, Left Updated: 04/03/24 2324     HS Troponin T 13 ng/L     Narrative:      High Sensitive Troponin T Reference Range:  <14.0 ng/L- Negative Female for AMI  <22.0 ng/L- Negative Male for AMI  >=14 - Abnormal Female indicating possible myocardial injury.  >=22 - Abnormal Male indicating possible myocardial injury.   Clinicians would have to utilize clinical acumen, EKG, Troponin, and serial changes to determine if it is an Acute Myocardial Infarction or myocardial injury due to an underlying chronic condition.         Comprehensive Metabolic Panel [[id number]]  (Abnormal) Collected: 04/03/24 2251    Specimen: Blood from Arm, Left Updated: 04/03/24 2317     Glucose 131 mg/dL      BUN 19 mg/dL      Creatinine 1.05 mg/dL      Sodium 134 mmol/L      Potassium 3.9 mmol/L      Comment: Slight hemolysis detected by analyzer. Result may be falsely elevated.        Chloride 102 mmol/L      CO2 22.1 mmol/L      Calcium 9.5 mg/dL      Total Protein 7.0 g/dL      Albumin 4.2 g/dL      ALT (SGPT) 15 U/L      AST (SGOT) 22 U/L      Alkaline Phosphatase 80 U/L      Total Bilirubin 0.6 mg/dL      Globulin 2.8 gm/dL      A/G Ratio 1.5 g/dL      BUN/Creatinine Ratio 18.1     Anion Gap 9.9 mmol/L      eGFR 60.2 mL/min/1.73     Narrative:      GFR Normal >60  Chronic Kidney Disease <60  Kidney Failure <15      COVID-19 and FLU A/B PCR, 1 HR TAT - Swab, Nasopharynx [[id number]]  (Normal) Collected: 04/03/24 2237    Specimen: Swab from Nasopharynx Updated: 04/03/24 2301     COVID19 Not Detected     Influenza A PCR Not Detected     Influenza B PCR Not Detected    Narrative:      Fact sheet for providers:  https://www.fda.gov/media/014893/download    Fact sheet for patients: https://www.fda.gov/media/446482/download    Test performed by PCR.    CBC & Differential [[id number]]  (Abnormal) Collected: 04/03/24 2251    Specimen: Blood from Arm, Left Updated: 04/03/24 2257    Narrative:      The following orders were created for panel order CBC & Differential.  Procedure                               Abnormality         Status                     ---------                               -----------         ------                     CBC Auto Differential[[id number]]        Abnormal            Final result                 Please view results for these tests on the individual orders.    CBC Auto Differential [[id number]]  (Abnormal) Collected: 04/03/24 2251    Specimen: Blood from Arm, Left Updated: 04/03/24 2257     WBC 9.57 10*3/mm3      RBC 4.64 10*6/mm3      Hemoglobin 13.0 g/dL      Hematocrit 41.3 %      MCV 89.0 fL      MCH 28.0 pg      MCHC 31.5 g/dL      RDW 14.0 %      RDW-SD 46.2 fl      MPV 9.6 fL      Platelets 192 10*3/mm3      Neutrophil % 84.4 %      Lymphocyte % 8.7 %      Monocyte % 6.1 %      Eosinophil % 0.4 %      Basophil % 0.2 %      Immature Grans % 0.2 %      Neutrophils, Absolute 8.08 10*3/mm3      Lymphocytes, Absolute 0.83 10*3/mm3      Monocytes, Absolute 0.58 10*3/mm3      Eosinophils, Absolute 0.04 10*3/mm3      Basophils, Absolute 0.02 10*3/mm3      Immature Grans, Absolute 0.02 10*3/mm3              Pending Results:     Imaging Reviewed:   CT Angiogram Chest Pulmonary Embolism    Result Date: 4/4/2024  Impression: 1. No evidence of pulmonary embolism. 2. No acute cardiopulmonary process. There is a mass present within the right lower lobe likely representing malignancy. There is enlarged right hilar adenopathy likely representing local metastatic disease. No previous imaging available for comparison. 3. Ancillary findings as described above. Electronically Signed: Martina Benites MD  4/4/2024 12:15  AM EDT  Workstation ID: [id number]    XR Chest 1 View    Result Date: 4/3/2024  Impression: No previous imaging available for comparison. Masslike opacity present within the right lower lobe. Malignancy cannot be excluded. Electronically Signed: Martina Benites MD  4/3/2024 11:23 PM EDT  Workstation ID: [id number]          Assessment & Plan     Tracy Reddy is a 63 y.o. female with history of pulmonary embolism and bilateral invasive ductal carcinoma status post bilateral mastectomy, chemotherapy and radiation treatments, on Arimidex.  She presented with dyspnea and was found to have a lung mass in the right lower lobe with right hilar and suprahilar lymphadenopathy.    Right lung mass  -CT imaging showed mass present within the medial right lower lobe measuring up to 3.2 x 4.1 cm that extends to the right hilar region.  Additional conglomerate of nodes present within right hilar to right suprahilar region measuring up to 2.7 x 1.8 cm.  -Pulmonology consultation for possible bronchoscopy/EBUS  -If this returns as breast primary, will need to check ER/NM status.     Bilateral invasive ductal carcinoma  -Diagnosed in 2020. She is status post bilateral mastectomy with reconstruction in 2021.  She is also completed neoadjuvant chemotherapy with 6 cycles of TCHP.  Taxotere and carboplatin with dose reductions at cycle 2 secondary to chemotherapy-induced nausea and vomiting.  Completed adjuvant Herceptin Perjeta for 1 year.  Status post radiation therapy left chest wall and regional nodes, 5 fractions for a total dose of 5000 cGray. Current treatment with hormone blockade with Arimidex.  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  She follows at [facility]    Further recommendations per Dr. Horn    Electronically signed by Lindy Aguero PA-C, 04/04/24    Patient seen and examined agree with assessment plan    Patient is a 63-year-old female with history of breast cancer who has been treated  with bilateral mastectomy, chemotherapy, radiation, maintenance anti-HER2 treatment and now on Arimidex.  Patient had ER/MA positive HER2/nancy positive disease.  There is some confusion about her pathology as some reports show HER2 negative some show positive however she did receive TCHP and also adjuvant Herceptin Perjeta maintenance which likely points towards her HER2 positive disease.  She also is known to have a Chek 2 mutation.  Now patient presents after she had a respiratory illness which initially her  had it and she started to have symptoms since earlier this week.  Progressively getting worse.    CT imaging with right lung mass within the medial right lobe measuring up to 4.1 cm this extends to the right hilar region.  Additional conglomerate of nodes present within the right hilar to right suprahilar region measuring up to 2.7 cm    Pulmonology has been consulted plan for bronchoscopy EBUS tomorrow.  Will follow-up on results.  Differentials include recurrent metastatic breast cancer versus new lung cancer which is less likely given that patient is a non-smoker.  CHEK2 gene mutation does not increase the risk of lung cancer.    Thank you for this consult. We will be happy to follow along with you.       I have obtained complete history and perform physical exam along with review of labs, imaging.  I have completed the majority and substantive portion of medical decision making    Pippa Horn MD

## 2024-04-04 NOTE — PROGRESS NOTES
[facility]     Progress Note    Patient Name: Tracy Reddy  : 1960  MRN: [id number]  Primary Care Physician:  Provider, No Known  Date of admission: 4/3/2024  Service date and time: 24 17:29 EDT  Subjective   Subjective     Chief Complaint: shortness of breath    HPI:  Patient denies any shortness of breath or any chest pain at this time.      Objective   Objective     Vitals:   Temp:  [97.8 °F (36.6 °C)-100.5 °F (38.1 °C)] 98.5 °F (36.9 °C)  Heart Rate:  [110-129] 123  Resp:  [15-32] 30  BP: (109-172)/() 139/73  Flow (L/min):  [2-3.5] 3  Physical Exam    Constitutional: Awake, alert   Eyes: PERRLA, sclerae anicteric, no conjunctival injection   HENT: NCAT, mucous membranes moist   Neck: Supple, no thyromegaly, no lymphadenopathy, trachea midline   Respiratory: Clear to auscultation bilaterally, nonlabored respirations    Cardiovascular: RRR, no murmurs, rubs, or gallops, palpable pedal pulses bilaterally   Gastrointestinal: Positive bowel sounds, soft, nontender, nondistended   Musculoskeletal: No bilateral ankle edema, no clubbing or cyanosis to extremities   Psychiatric: Appropriate affect, cooperative   Neurologic: Oriented x 3, strength symmetric in all extremities, Cranial Nerves grossly intact to confrontation, speech clear   Skin: No rashes     Result Review    Result Review:  I have personally reviewed the results from the time of this admission to 2024 17:29 EDT and agree with these findings:  [x]  Laboratory list / accordion  []  Microbiology  []  Radiology  []  EKG/Telemetry   []  Cardiology/Vascular   []  Pathology  []  Old records  []  Other:  Most notable findings include: Glucose 125, BUN 16, creatinine 1.01, sodium 138, potassium 4.0, chloride 103, CO2 24      Assessment & Plan   Assessment / Plan       Active Hospital Problems:  Active Hospital Problems    Diagnosis     **Abnormal CT of the chest     History of breast cancer     Dyspnea     Fever     Lung mass       Plan:      Abnormal CT of the chest per radiology concerning for malignancy with a past medical history of breast cancer, oncology hematology consulted, pulmonary consulted, patient to have bronchoscopy and EUS in the morningDyspnea, likely secondary to above, WBC not elevated, no acute process on chest x-ray per radiology, sputum culture ordered and pending stable on 2 L oxygen via nasal cannula DuoNebs every 4 hours as needed shortness of air or wheezing     Fever, acetaminophen 1000 mg every 6 hours as needed     History of breast cancer reports on Arimidex     DVT prophylaxis:  Mechanical DVT prophylaxis orders are present.        CODE STATUS:   Code Status (Patient has no pulse and is not breathing): CPR (Attempt to Resuscitate)  Medical Interventions (Patient has pulse or is breathing): Full Support    Disposition:  I expect patient to be discharged in 2 days .    Nicholas Vasques MD

## 2024-04-04 NOTE — ED NOTES
Patient refuses to be transported to [facility] by EMS. We explained the need for oxygen and to be monitored. Patient and  still refused stating they could drive themselves. Patient signed a refusal of EMS form and again strongly encouraged to go by EMS. Patient addiment to to by private vehicle

## 2024-04-05 ENCOUNTER — ANESTHESIA (OUTPATIENT)
Dept: GASTROENTEROLOGY | Facility: HOSPITAL | Age: 64
End: 2024-04-05
Payer: COMMERCIAL

## 2024-04-05 PROBLEM — J06.9 UPPER RESPIRATORY INFECTION, ACUTE: Status: ACTIVE | Noted: 2024-04-05

## 2024-04-05 LAB
ANION GAP SERPL CALCULATED.3IONS-SCNC: 13 MMOL/L (ref 5–15)
B PARAPERT DNA SPEC QL NAA+PROBE: NOT DETECTED
B PERT DNA SPEC QL NAA+PROBE: NOT DETECTED
BASOPHILS # BLD AUTO: 0.03 10*3/MM3 (ref 0–0.2)
BASOPHILS NFR BLD AUTO: 0.3 % (ref 0–1.5)
BEAKER LAB AP INTRAOPERATIVE CONSULTATION: NORMAL
BUN SERPL-MCNC: 11 MG/DL (ref 8–23)
BUN/CREAT SERPL: 12.4 (ref 7–25)
C PNEUM DNA NPH QL NAA+NON-PROBE: NOT DETECTED
CALCIUM SPEC-SCNC: 9.2 MG/DL (ref 8.6–10.5)
CHLORIDE SERPL-SCNC: 104 MMOL/L (ref 98–107)
CO2 SERPL-SCNC: 23 MMOL/L (ref 22–29)
CREAT SERPL-MCNC: 0.89 MG/DL (ref 0.57–1)
DEPRECATED RDW RBC AUTO: 46.8 FL (ref 37–54)
EGFRCR SERPLBLD CKD-EPI 2021: 73.4 ML/MIN/1.73
EOSINOPHIL # BLD AUTO: 0.14 10*3/MM3 (ref 0–0.4)
EOSINOPHIL NFR BLD AUTO: 1.4 % (ref 0.3–6.2)
ERYTHROCYTE [DISTWIDTH] IN BLOOD BY AUTOMATED COUNT: 14.3 % (ref 12.3–15.4)
FLUAV SUBTYP SPEC NAA+PROBE: NOT DETECTED
FLUBV RNA ISLT QL NAA+PROBE: NOT DETECTED
GLUCOSE SERPL-MCNC: 94 MG/DL (ref 65–99)
HADV DNA SPEC NAA+PROBE: NOT DETECTED
HCOV 229E RNA SPEC QL NAA+PROBE: NOT DETECTED
HCOV HKU1 RNA SPEC QL NAA+PROBE: NOT DETECTED
HCOV NL63 RNA SPEC QL NAA+PROBE: NOT DETECTED
HCOV OC43 RNA SPEC QL NAA+PROBE: NOT DETECTED
HCT VFR BLD AUTO: 39.4 % (ref 34–46.6)
HGB BLD-MCNC: 12.2 G/DL (ref 12–15.9)
HMPV RNA NPH QL NAA+NON-PROBE: DETECTED
HPIV1 RNA ISLT QL NAA+PROBE: NOT DETECTED
HPIV2 RNA SPEC QL NAA+PROBE: NOT DETECTED
HPIV3 RNA NPH QL NAA+PROBE: NOT DETECTED
HPIV4 P GENE NPH QL NAA+PROBE: NOT DETECTED
IMM GRANULOCYTES # BLD AUTO: 0.03 10*3/MM3 (ref 0–0.05)
IMM GRANULOCYTES NFR BLD AUTO: 0.3 % (ref 0–0.5)
INR PPP: 0.99 (ref 0.93–1.1)
LAB AP CASE REPORT: NORMAL
LYMPHOCYTES # BLD AUTO: 1.52 10*3/MM3 (ref 0.7–3.1)
LYMPHOCYTES NFR BLD AUTO: 15.6 % (ref 19.6–45.3)
M PNEUMO IGG SER IA-ACNC: NOT DETECTED
MCH RBC QN AUTO: 27.7 PG (ref 26.6–33)
MCHC RBC AUTO-ENTMCNC: 31 G/DL (ref 31.5–35.7)
MCV RBC AUTO: 89.5 FL (ref 79–97)
MONOCYTES # BLD AUTO: 0.6 10*3/MM3 (ref 0.1–0.9)
MONOCYTES NFR BLD AUTO: 6.2 % (ref 5–12)
NEUTROPHILS NFR BLD AUTO: 7.43 10*3/MM3 (ref 1.7–7)
NEUTROPHILS NFR BLD AUTO: 76.2 % (ref 42.7–76)
NRBC BLD AUTO-RTO: 0 /100 WBC (ref 0–0.2)
PLATELET # BLD AUTO: 180 10*3/MM3 (ref 140–450)
PMV BLD AUTO: 9.4 FL (ref 6–12)
POTASSIUM SERPL-SCNC: 4.2 MMOL/L (ref 3.5–5.2)
PROTHROMBIN TIME: 10.8 SECONDS (ref 9.6–11.7)
RBC # BLD AUTO: 4.4 10*6/MM3 (ref 3.77–5.28)
RHINOVIRUS RNA SPEC NAA+PROBE: NOT DETECTED
RSV RNA NPH QL NAA+NON-PROBE: NOT DETECTED
SARS-COV-2 RNA NPH QL NAA+NON-PROBE: NOT DETECTED
SODIUM SERPL-SCNC: 140 MMOL/L (ref 136–145)
WBC NRBC COR # BLD AUTO: 9.75 10*3/MM3 (ref 3.4–10.8)

## 2024-04-05 PROCEDURE — 99232 SBSQ HOSP IP/OBS MODERATE 35: CPT | Performed by: INTERNAL MEDICINE

## 2024-04-05 PROCEDURE — 07978ZX DRAINAGE OF THORAX LYMPHATIC, VIA NATURAL OR ARTIFICIAL OPENING ENDOSCOPIC APPROACH, DIAGNOSTIC: ICD-10-PCS | Performed by: INTERNAL MEDICINE

## 2024-04-05 PROCEDURE — 88360 TUMOR IMMUNOHISTOCHEM/MANUAL: CPT | Performed by: INTERNAL MEDICINE

## 2024-04-05 PROCEDURE — 25010000002 GLYCOPYRROLATE 1 MG/5ML SOLUTION: Performed by: NURSE ANESTHETIST, CERTIFIED REGISTERED

## 2024-04-05 PROCEDURE — 88108 CYTOPATH CONCENTRATE TECH: CPT | Performed by: INTERNAL MEDICINE

## 2024-04-05 PROCEDURE — 25010000002 FENTANYL CITRATE (PF) 100 MCG/2ML SOLUTION: Performed by: NURSE ANESTHETIST, CERTIFIED REGISTERED

## 2024-04-05 PROCEDURE — 85025 COMPLETE CBC W/AUTO DIFF WBC: CPT | Performed by: INTERNAL MEDICINE

## 2024-04-05 PROCEDURE — 80048 BASIC METABOLIC PNL TOTAL CA: CPT | Performed by: INTERNAL MEDICINE

## 2024-04-05 PROCEDURE — 87185 SC STD ENZYME DETCJ PER NZM: CPT | Performed by: INTERNAL MEDICINE

## 2024-04-05 PROCEDURE — 88305 TISSUE EXAM BY PATHOLOGIST: CPT | Performed by: INTERNAL MEDICINE

## 2024-04-05 PROCEDURE — 0B9M8ZX DRAINAGE OF BILATERAL LUNGS, VIA NATURAL OR ARTIFICIAL OPENING ENDOSCOPIC, DIAGNOSTIC: ICD-10-PCS | Performed by: INTERNAL MEDICINE

## 2024-04-05 PROCEDURE — 87116 MYCOBACTERIA CULTURE: CPT | Performed by: INTERNAL MEDICINE

## 2024-04-05 PROCEDURE — 88177 CYTP FNA EVAL EA ADDL: CPT | Performed by: INTERNAL MEDICINE

## 2024-04-05 PROCEDURE — 87206 SMEAR FLUORESCENT/ACID STAI: CPT | Performed by: INTERNAL MEDICINE

## 2024-04-05 PROCEDURE — 87077 CULTURE AEROBIC IDENTIFY: CPT | Performed by: INTERNAL MEDICINE

## 2024-04-05 PROCEDURE — 0B9F8ZX DRAINAGE OF RIGHT LOWER LUNG LOBE, VIA NATURAL OR ARTIFICIAL OPENING ENDOSCOPIC, DIAGNOSTIC: ICD-10-PCS | Performed by: INTERNAL MEDICINE

## 2024-04-05 PROCEDURE — 87205 SMEAR GRAM STAIN: CPT | Performed by: INTERNAL MEDICINE

## 2024-04-05 PROCEDURE — C1726 CATH, BAL DIL, NON-VASCULAR: HCPCS | Performed by: INTERNAL MEDICINE

## 2024-04-05 PROCEDURE — 25010000002 SUCCINYLCHOLINE PER 20 MG: Performed by: NURSE ANESTHETIST, CERTIFIED REGISTERED

## 2024-04-05 PROCEDURE — 87070 CULTURE OTHR SPECIMN AEROBIC: CPT | Performed by: INTERNAL MEDICINE

## 2024-04-05 PROCEDURE — 88342 IMHCHEM/IMCYTCHM 1ST ANTB: CPT | Performed by: INTERNAL MEDICINE

## 2024-04-05 PROCEDURE — 85610 PROTHROMBIN TIME: CPT | Performed by: INTERNAL MEDICINE

## 2024-04-05 PROCEDURE — 87102 FUNGUS ISOLATION CULTURE: CPT | Performed by: INTERNAL MEDICINE

## 2024-04-05 PROCEDURE — 87798 DETECT AGENT NOS DNA AMP: CPT | Performed by: INTERNAL MEDICINE

## 2024-04-05 PROCEDURE — 25010000002 PROPOFOL 1000 MG/100ML EMULSION: Performed by: NURSE ANESTHETIST, CERTIFIED REGISTERED

## 2024-04-05 PROCEDURE — 25810000003 SODIUM CHLORIDE 0.9 % SOLUTION: Performed by: INTERNAL MEDICINE

## 2024-04-05 PROCEDURE — 88172 CYTP DX EVAL FNA 1ST EA SITE: CPT | Performed by: INTERNAL MEDICINE

## 2024-04-05 PROCEDURE — 25010000002 SUGAMMADEX 200 MG/2ML SOLUTION: Performed by: NURSE ANESTHETIST, CERTIFIED REGISTERED

## 2024-04-05 PROCEDURE — 88341 IMHCHEM/IMCYTCHM EA ADD ANTB: CPT | Performed by: INTERNAL MEDICINE

## 2024-04-05 PROCEDURE — 0202U NFCT DS 22 TRGT SARS-COV-2: CPT | Performed by: INTERNAL MEDICINE

## 2024-04-05 RX ORDER — NALOXONE HCL 0.4 MG/ML
0.4 VIAL (ML) INJECTION AS NEEDED
Status: DISCONTINUED | OUTPATIENT
Start: 2024-04-05 | End: 2024-04-05 | Stop reason: HOSPADM

## 2024-04-05 RX ORDER — HYDRALAZINE HYDROCHLORIDE 20 MG/ML
5 INJECTION INTRAMUSCULAR; INTRAVENOUS
Status: DISCONTINUED | OUTPATIENT
Start: 2024-04-05 | End: 2024-04-05 | Stop reason: HOSPADM

## 2024-04-05 RX ORDER — LIDOCAINE HYDROCHLORIDE 10 MG/ML
INJECTION, SOLUTION EPIDURAL; INFILTRATION; INTRACAUDAL; PERINEURAL AS NEEDED
Status: DISCONTINUED | OUTPATIENT
Start: 2024-04-05 | End: 2024-04-05 | Stop reason: SURG

## 2024-04-05 RX ORDER — IPRATROPIUM BROMIDE AND ALBUTEROL SULFATE 2.5; .5 MG/3ML; MG/3ML
3 SOLUTION RESPIRATORY (INHALATION) ONCE AS NEEDED
Status: DISCONTINUED | OUTPATIENT
Start: 2024-04-05 | End: 2024-04-05 | Stop reason: HOSPADM

## 2024-04-05 RX ORDER — ROCURONIUM BROMIDE 10 MG/ML
INJECTION, SOLUTION INTRAVENOUS AS NEEDED
Status: DISCONTINUED | OUTPATIENT
Start: 2024-04-05 | End: 2024-04-05 | Stop reason: SURG

## 2024-04-05 RX ORDER — ONDANSETRON 2 MG/ML
4 INJECTION INTRAMUSCULAR; INTRAVENOUS ONCE AS NEEDED
Status: DISCONTINUED | OUTPATIENT
Start: 2024-04-05 | End: 2024-04-05 | Stop reason: HOSPADM

## 2024-04-05 RX ORDER — SUCCINYLCHOLINE CHLORIDE 20 MG/ML
INJECTION INTRAMUSCULAR; INTRAVENOUS AS NEEDED
Status: DISCONTINUED | OUTPATIENT
Start: 2024-04-05 | End: 2024-04-05 | Stop reason: SURG

## 2024-04-05 RX ORDER — DIPHENHYDRAMINE HYDROCHLORIDE 50 MG/ML
12.5 INJECTION INTRAMUSCULAR; INTRAVENOUS ONCE AS NEEDED
Status: DISCONTINUED | OUTPATIENT
Start: 2024-04-05 | End: 2024-04-05 | Stop reason: HOSPADM

## 2024-04-05 RX ORDER — FENTANYL CITRATE 50 UG/ML
INJECTION, SOLUTION INTRAMUSCULAR; INTRAVENOUS AS NEEDED
Status: DISCONTINUED | OUTPATIENT
Start: 2024-04-05 | End: 2024-04-05 | Stop reason: SURG

## 2024-04-05 RX ORDER — EPHEDRINE SULFATE 5 MG/ML
5 INJECTION INTRAVENOUS ONCE AS NEEDED
Status: DISCONTINUED | OUTPATIENT
Start: 2024-04-05 | End: 2024-04-05 | Stop reason: HOSPADM

## 2024-04-05 RX ORDER — AZITHROMYCIN 250 MG/1
500 TABLET, FILM COATED ORAL
Qty: 6 TABLET | Refills: 0 | Status: DISCONTINUED | OUTPATIENT
Start: 2024-04-05 | End: 2024-04-06 | Stop reason: HOSPADM

## 2024-04-05 RX ORDER — SODIUM CHLORIDE 9 MG/ML
50 INJECTION, SOLUTION INTRAVENOUS CONTINUOUS
Status: DISCONTINUED | OUTPATIENT
Start: 2024-04-05 | End: 2024-04-06

## 2024-04-05 RX ORDER — PHENYLEPHRINE HCL IN 0.9% NACL 1 MG/10 ML
SYRINGE (ML) INTRAVENOUS AS NEEDED
Status: DISCONTINUED | OUTPATIENT
Start: 2024-04-05 | End: 2024-04-05 | Stop reason: SURG

## 2024-04-05 RX ORDER — OXYCODONE HYDROCHLORIDE 5 MG/1
5 TABLET ORAL ONCE AS NEEDED
Status: DISCONTINUED | OUTPATIENT
Start: 2024-04-05 | End: 2024-04-05 | Stop reason: HOSPADM

## 2024-04-05 RX ORDER — LABETALOL HYDROCHLORIDE 5 MG/ML
5 INJECTION, SOLUTION INTRAVENOUS
Status: DISCONTINUED | OUTPATIENT
Start: 2024-04-05 | End: 2024-04-05 | Stop reason: HOSPADM

## 2024-04-05 RX ORDER — OXYCODONE HYDROCHLORIDE 5 MG/1
10 TABLET ORAL EVERY 4 HOURS PRN
Status: DISCONTINUED | OUTPATIENT
Start: 2024-04-05 | End: 2024-04-05 | Stop reason: HOSPADM

## 2024-04-05 RX ORDER — GLYCOPYRROLATE 0.2 MG/ML
INJECTION INTRAMUSCULAR; INTRAVENOUS AS NEEDED
Status: DISCONTINUED | OUTPATIENT
Start: 2024-04-05 | End: 2024-04-05 | Stop reason: SURG

## 2024-04-05 RX ORDER — PROPOFOL 10 MG/ML
INJECTION, EMULSION INTRAVENOUS AS NEEDED
Status: DISCONTINUED | OUTPATIENT
Start: 2024-04-05 | End: 2024-04-05 | Stop reason: SURG

## 2024-04-05 RX ORDER — BUTALBITAL, ACETAMINOPHEN AND CAFFEINE 50; 325; 40 MG/1; MG/1; MG/1
1 TABLET ORAL EVERY 4 HOURS PRN
Status: DISCONTINUED | OUTPATIENT
Start: 2024-04-05 | End: 2024-04-06 | Stop reason: HOSPADM

## 2024-04-05 RX ORDER — DIPHENHYDRAMINE HYDROCHLORIDE 50 MG/ML
12.5 INJECTION INTRAMUSCULAR; INTRAVENOUS
Status: DISCONTINUED | OUTPATIENT
Start: 2024-04-05 | End: 2024-04-05 | Stop reason: HOSPADM

## 2024-04-05 RX ADMIN — ROCURONIUM BROMIDE 30 MG: 50 INJECTION INTRAVENOUS at 07:55

## 2024-04-05 RX ADMIN — Medication 100 MCG: at 08:15

## 2024-04-05 RX ADMIN — FENTANYL CITRATE 25 MCG: 50 INJECTION, SOLUTION INTRAMUSCULAR; INTRAVENOUS at 08:09

## 2024-04-05 RX ADMIN — SUGAMMADEX 200 MG: 100 INJECTION, SOLUTION INTRAVENOUS at 08:29

## 2024-04-05 RX ADMIN — ACETAMINOPHEN 1000 MG: 500 TABLET, FILM COATED ORAL at 13:05

## 2024-04-05 RX ADMIN — AZITHROMYCIN DIHYDRATE 500 MG: 250 TABLET ORAL at 14:41

## 2024-04-05 RX ADMIN — SODIUM CHLORIDE 50 ML/HR: 9 INJECTION, SOLUTION INTRAVENOUS at 07:27

## 2024-04-05 RX ADMIN — SODIUM CHLORIDE 50 ML/HR: 9 INJECTION, SOLUTION INTRAVENOUS at 14:45

## 2024-04-05 RX ADMIN — GLYCOPYRROLATE 0.1 MCG: 0.2 INJECTION INTRAMUSCULAR; INTRAVENOUS at 07:50

## 2024-04-05 RX ADMIN — OXYCODONE 5 MG: 5 TABLET ORAL at 13:05

## 2024-04-05 RX ADMIN — PROPOFOL INJECTABLE EMULSION 150 MG: 10 INJECTION, EMULSION INTRAVENOUS at 07:50

## 2024-04-05 RX ADMIN — LIDOCAINE HYDROCHLORIDE 50 MG: 10 INJECTION, SOLUTION EPIDURAL; INFILTRATION; INTRACAUDAL; PERINEURAL at 07:50

## 2024-04-05 RX ADMIN — FENTANYL CITRATE 50 MCG: 50 INJECTION, SOLUTION INTRAMUSCULAR; INTRAVENOUS at 07:50

## 2024-04-05 RX ADMIN — ROCURONIUM BROMIDE 10 MG: 50 INJECTION INTRAVENOUS at 07:50

## 2024-04-05 RX ADMIN — FENTANYL CITRATE 25 MCG: 50 INJECTION, SOLUTION INTRAMUSCULAR; INTRAVENOUS at 08:20

## 2024-04-05 RX ADMIN — Medication 100 MCG: at 07:59

## 2024-04-05 RX ADMIN — ACETAMINOPHEN 1000 MG: 500 TABLET, FILM COATED ORAL at 23:31

## 2024-04-05 RX ADMIN — SUCCINYLCHOLINE CHLORIDE 100 MG: 20 INJECTION, SOLUTION INTRAMUSCULAR; INTRAVENOUS at 07:50

## 2024-04-05 NOTE — ANESTHESIA PREPROCEDURE EVALUATION
Anesthesia Evaluation     Patient summary reviewed and Nursing notes reviewed   NPO Solid Status: > 8 hours  NPO Liquid Status: > 8 hours           Airway   Mallampati: II  TM distance: >3 FB  Neck ROM: full  No difficulty expected  Dental - normal exam     Pulmonary - normal exam    breath sounds clear to auscultation  (+) pneumonia ,shortness of breath  Cardiovascular - negative cardio ROS and normal exam  Exercise tolerance: unable to assess    ECG reviewed  Rhythm: regular  Rate: normal      ROS comment: NL ECHO    Neuro/Psych- negative ROS  GI/Hepatic/Renal/Endo    (+) obesity    Musculoskeletal (-) negative ROS    Abdominal  - normal exam   Substance History - negative use     OB/GYN negative ob/gyn ROS         Other      history of cancer (Breasy Mets to Lung?)    ROS/Med Hx Other:   History of Present Illness:     63-year-old female admitted on 4/3/2024 with fever of 103 and productive cough of green sputum,  CT chest showed large right hilar mass suspicious for malignancy  History of bilateral ductal carcinoma status post bilateral mastectomy 2020 status post reconstruction 2021  History of pulmonary embolism        Assessment:     Right hilar mass suspicious for malignancy  History of bilateral ductal carcinoma status post bilateral mastectomy 2020 status post reconstruction 2021  History of pulmonary embolism        Recommendations:        EBUS bronchoscopy in a.m.                  Anesthesia Plan    ASA 3     general   total IV anesthesia  (Breast ca recurrence to Lung?, GETA large ETT)  intravenous induction     Anesthetic plan, risks, benefits, and alternatives have been provided, discussed and informed consent has been obtained with: patient.    CODE STATUS:    Code Status (Patient has no pulse and is not breathing): CPR (Attempt to Resuscitate)  Medical Interventions (Patient has pulse or is breathing): Full Support

## 2024-04-05 NOTE — PROGRESS NOTES
[facility] MEDICINE SERVICE  DAILY PROGRESS NOTE    NAME: Tracy Reddy  : 1960  MRN: [id number]      LOS: 0 days     PROVIDER OF SERVICE: YESSI Gardner    Chief Complaint: Abnormal CT of the chest    Subjective:     Interval History:  History taken from: patient chart  Patient with complaints of headache at this time.  Patient seen and examined while lying in bed no acute adverse events noted    Objective:     Vital Signs  Temp:  [98.1 °F (36.7 °C)-99.4 °F (37.4 °C)] 98.5 °F (36.9 °C)  Heart Rate:  [] 110  Resp:  [20-28] 25  BP: (109-132)/(52-99) 129/55  Flow (L/min):  [2-6] 2   Body mass index is 33.2 kg/m².    Physical Exam  PHYSICAL EXAM  Constitutional:  Well developed, well nourished, no acute distress, non-toxic appearance   Eyes:  PERRL, conjunctiva normal, EOMI   HENT:  Atraumatic, external ears normal, nose normal, oropharynx moist, no pharyngeal exudates. Neck-normal range of motion, no tenderness, supple, trachea midline  Respiratory:  ctab, non-labored respirations without accessory muscle use  Cardiovascular:  Normal rate, normal rhythm, no murmurs, no gallops, no rubs   GI:  Soft, nondistended, normal bowel sounds, nontender, no organomegaly, no mass, no rebound, no guarding   :  No costovertebral angle tenderness   Musculoskeletal:  No edema, no tenderness, no deformities  Integument:  Well hydrated, no rash   Lymphatic:  No lymphadenopathy noted   Neurologic:  Alert & oriented x 3, CN 2-12 normal, normal motor function, normal sensory function, no focal deficits noted   Psychiatric:  Speech and behavior appropriate      Scheduled Meds   azithromycin, 500 mg, Oral, Q24H  sodium chloride, 1,000 mL, Intravenous, Once       PRN Meds     acetaminophen    ipratropium-albuterol    oxyCODONE   Infusions  sodium chloride, 50 mL/hr, Last Rate: 100 mL/hr (24 0735)          Diagnostic Data    Results from last 7 days   Lab Units 24  0415 24  0672  04/03/24  2251   WBC 10*3/mm3 9.75  --  9.57   HEMOGLOBIN g/dL 12.2  --  13.0   HEMATOCRIT % 39.4  --  41.3   PLATELETS 10*3/mm3 180  --  192   GLUCOSE mg/dL 94   < > 131*   CREATININE mg/dL 0.89   < > 1.05*   BUN mg/dL 11   < > 19   SODIUM mmol/L 140   < > 134*   POTASSIUM mmol/L 4.2   < > 3.9   AST (SGOT) U/L  --   --  22   ALT (SGPT) U/L  --   --  15   ALK PHOS U/L  --   --  80   BILIRUBIN mg/dL  --   --  0.6   ANION GAP mmol/L 13.0   < > 9.9    < > = values in this interval not displayed.       CT Angiogram Chest Pulmonary Embolism    Result Date: 4/4/2024  Impression: 1. No evidence of pulmonary embolism. 2. No acute cardiopulmonary process. There is a mass present within the right lower lobe likely representing malignancy. There is enlarged right hilar adenopathy likely representing local metastatic disease. No previous imaging available for comparison. 3. Ancillary findings as described above. Electronically Signed: Martina Benites MD  4/4/2024 12:15 AM EDT  Workstation ID: [id number]    XR Chest 1 View    Result Date: 4/3/2024  Impression: No previous imaging available for comparison. Masslike opacity present within the right lower lobe. Malignancy cannot be excluded. Electronically Signed: Martina Benites MD  4/3/2024 11:23 PM EDT  Workstation ID: [id number]       I reviewed the patient's new clinical results.  I reviewed the patient's new imaging results and agree with the interpretation.    Assessment/Plan:     Active and Resolved Problems  Active Hospital Problems    Diagnosis  POA    **Abnormal CT of the chest [R93.89]  Yes    History of breast cancer [Z85.3]  Not Applicable    Dyspnea [R06.00]  Yes    Fever [R50.9]  Yes    Lung mass [R91.8]  Unknown      Resolved Hospital Problems   No resolved problems to display.     Right lung mass  Dyspnea  Fever  - CT of chest noted for mass present within the right lower lobe representing malignancy there is a large right hilar adenopathy likely representing local  metastatic disease.  - Bronchoscopy completed.  Please see Dr. Irving's note for further details.  - Pulmonology following  - Cytology results pending  - Oxygen supplementation to keep oxygen saturation greater than 90.  Patient currently on 2 L oxygen.  - DuoNebs every 4 hours as needed for shortness of air or wheezing    Bilateral anterior ductal carcinoma  - Diagnosed in 2020  - Bilateral mastectomy with reconstruction in 2021  - Currently taking Arimidex  - Oncology consulted      Discussed plan of care with patient and family.  Plan is to await results, and recommendations from oncology.  Fioricet ordered for headache.    DVT prophylaxis:  Mechanical DVT prophylaxis orders are present.         Code status is   Code Status and Medical Interventions:   Ordered at: 04/04/24 0454     Code Status (Patient has no pulse and is not breathing):    CPR (Attempt to Resuscitate)     Medical Interventions (Patient has pulse or is breathing):    Full Support       Plan for disposition: Pending clinical process and results.    Time: 30 minutes    Signature: Electronically signed by YESSI Gardner, 04/05/24, 12:27 EDT.  [facility] Hospitalist Team

## 2024-04-05 NOTE — PLAN OF CARE
Problem: Adult Inpatient Plan of Care  Goal: Absence of Hospital-Acquired Illness or Injury  Intervention: Identify and Manage Fall Risk  Recent Flowsheet Documentation  Taken 4/5/2024 0334 by Negrita Pedersen RN  Safety Promotion/Fall Prevention:   safety round/check completed   room organization consistent   nonskid shoes/slippers when out of bed  Taken 4/5/2024 0005 by Negrita Pedersen RN  Safety Promotion/Fall Prevention:   safety round/check completed   room organization consistent   nonskid shoes/slippers when out of bed  Taken 4/4/2024 2000 by Negrita Pedersen RN  Safety Promotion/Fall Prevention:   safety round/check completed   room organization consistent   nonskid shoes/slippers when out of bed  Intervention: Prevent Skin Injury  Recent Flowsheet Documentation  Taken 4/5/2024 0334 by Negrita Pedersen RN  Skin Protection: adhesive use limited  Taken 4/4/2024 2000 by Negrita Pedersen RN  Skin Protection: adhesive use limited  Intervention: Prevent and Manage VTE (Venous Thromboembolism) Risk  Recent Flowsheet Documentation  Taken 4/5/2024 0334 by Negrita Pedersen RN  Activity Management: up ad morenita  Taken 4/5/2024 0005 by Negrita Pedersen RN  Activity Management: up ad morenita  Taken 4/4/2024 2000 by Negrita Pedersen RN  Activity Management: up ad morenita  Goal: Optimal Comfort and Wellbeing  Intervention: Monitor Pain and Promote Comfort  Recent Flowsheet Documentation  Taken 4/4/2024 2136 by Negrita Pedersen RN  Pain Management Interventions: see MAR  Intervention: Provide Person-Centered Care  Recent Flowsheet Documentation  Taken 4/5/2024 0334 by Negrita Pedersen RN  Trust Relationship/Rapport:   care explained   questions answered   questions encouraged   thoughts/feelings acknowledged  Taken 4/5/2024 0005 by Negrita Pedersen RN  Trust Relationship/Rapport:   care explained   questions answered   questions encouraged   thoughts/feelings acknowledged  Taken 4/4/2024 2000 by  Negrita Pedersen RN  Trust Relationship/Rapport: care explained     Problem: Adjustment to Illness (Sepsis/Septic Shock)  Goal: Optimal Coping  Intervention: Optimize Psychosocial Adjustment to Illness  Recent Flowsheet Documentation  Taken 4/5/2024 0334 by Negrita Pedersen RN  Supportive Measures: active listening utilized  Taken 4/5/2024 0005 by Negrita Pedersen RN  Supportive Measures: active listening utilized  Taken 4/4/2024 2000 by Negrita Pedersen RN  Supportive Measures: active listening utilized  Goal: Optimal Coping  Intervention: Optimize Psychosocial Adjustment to Illness  Recent Flowsheet Documentation  Taken 4/5/2024 0334 by Negrita Pedersen RN  Supportive Measures: active listening utilized  Taken 4/5/2024 0005 by Negrita Pedersen RN  Supportive Measures: active listening utilized  Taken 4/4/2024 2000 by Negrita Pedersen RN  Supportive Measures: active listening utilized     Problem: Infection Progression (Sepsis/Septic Shock)  Goal: Absence of Infection Signs and Symptoms  Intervention: Promote Recovery  Recent Flowsheet Documentation  Taken 4/5/2024 0334 by Negrita Pedersen RN  Activity Management: up ad morenita  Taken 4/5/2024 0005 by Negrita Pedersen RN  Activity Management: up ad morenita  Taken 4/4/2024 2000 by Negrita Pedersen RN  Activity Management: up ad morenita  Sleep/Rest Enhancement:   family presence promoted   consistent schedule promoted  Goal: Absence of Infection Signs and Symptoms  Intervention: Promote Recovery  Recent Flowsheet Documentation  Taken 4/5/2024 0334 by Negrita Pedersen RN  Activity Management: up ad morenita  Taken 4/5/2024 0005 by Negrita Pedersen RN  Activity Management: up ad morenita  Taken 4/4/2024 2000 by Negrita Pedersen RN  Activity Management: up ad morenita  Sleep/Rest Enhancement:   family presence promoted   consistent schedule promoted   Goal Outcome Evaluation:

## 2024-04-05 NOTE — ANESTHESIA PROCEDURE NOTES
Airway  Urgency: elective    Date/Time: 4/5/2024 7:51 AM  Airway not difficult    General Information and Staff    Patient location during procedure: OR    Indications and Patient Condition  Indications for airway management: airway protection    Preoxygenated: yes  MILS maintained throughout  Mask difficulty assessment: 1 - vent by mask    Final Airway Details  Final airway type: endotracheal airway      Successful airway: ETT  Cuffed: yes   Successful intubation technique: video laryngoscopy  Facilitating devices/methods: intubating stylet  Endotracheal tube insertion site: oral  Blade: Wolff  Blade size: 3  ETT size (mm): 8.5  Cormack-Lehane Classification: grade IIa - partial view of glottis  Placement verified by: chest auscultation, bronchoscopy and capnometry   Number of attempts at approach: 1  Assessment: lips, teeth, and gum same as pre-op and atraumatic intubation

## 2024-04-05 NOTE — PAYOR COMM NOTE
"Clinical for case# [id number]     Observation order 4/4/24  Inpatient order 4/5/24    ER admit with hypoxia. Treatment for URI and new lung mass.   MD notes and clinical attached.   =========  AUTHORIZATION PENDING:   PLEASE FAX OR CALL DETERMINATION TO CONTACT BELOW:       THANK YOU,    KAYLYN Ordonez, RN  Utilization Review  [facility]  Phone: [phone]  Fax: [phone]      NPI: [id number]  Tax ID: [id number]      Tracy Hoyos (63 y.o. Female)       Date of Birth   1960    Social Security Number       Address   [address] [place] [address] [address] [place] [facility]    Home Phone       MRN   [id number]       Sikhism   None    Marital Status                               Admission Date   4/3/24    Admission Type   Emergency    Admitting Provider   Petr De La Fuente MD    Attending Provider   Ana Rosa Nava MD    Department, Room/Bed   [facility] 3A MEDICAL INPATIENT, 302/1       Discharge Date       Discharge Disposition       Discharge Destination                                 Attending Provider: Ana Rosa Nava MD    Allergies: Penicillins    Isolation: Contact   Infection: Human Metapneumovirus  (04/05/24)   Code Status: CPR    Ht: 162.6 cm (64\")   Wt: 87.7 kg (193 lb 6.4 oz)    Admission Cmt: None   Principal Problem: Abnormal CT of the chest [R93.89]                   Active Insurance as of 4/3/2024       Primary Coverage       Payor Plan Insurance Group Employer/Plan Group    [employer] CIGJANE [id number]       Payor Plan Address Payor Plan Phone Number Payor Plan Fax Number Effective Dates    [address] [phone]  9/1/2021 - None Entered    [facility] [id number]         Subscriber Name Subscriber Birth Date Member ID       TRACY HOYOS 1960 [id number]                     Emergency Contacts        (Rel.) Home Phone Work Phone Mobile Phone    ROSELIA HOYOS (Spouse) -- -- [phone]                 History & Physical        Essing-Nicholas, " YESSI Yin at 24 0410       Attestation signed by Petr De La Fuente MD at 24 0616    I have reviewed this document and agree                    [facility] Services  History & Physical    Patient Name: Tracy Reddy  : 1960  MRN: [id number]  Primary Care Physician:  Provider, No Known  Date of admission: 4/3/2024  Date and Time of Service: 4/3/2024 at 0410    Subjective      Chief Complaint: shortness of breath     History of Present Illness: Tracy Reddy is a very pleasant  63 y.o. female with a CMH of bilateral anterior ductal carcinoma diagnosed in  status post bilateral mastectomy with reconstruction in , status post neoadjuvant chemotherapy and radiation currently on Arimidex who presented to [facility] emergency department on 4/3/2024 with complaints of fever up to 103, cough productive of brown and green sputum, shortness of air and chest tightness.for the past several days . She reports family members have been sick at home.  She reports she had a portable pulse oximeter at home was reading 87%.  She reports she had a history of a PE in the past.  The records shows she just saw her oncologist at [facility] on 2024.  She is awake and alert and answers appropriately.  She is currently stable on 2 L oxygen via nasal cannula.  She does not use oxygen at home.  She is a non-smoker.    Labs showed a troponin was 13 x 2, proBNP 146.8 sodium 134 creatinine 1.05 glucose 131 WBC was not elevated, COVID-19 was negative flu a was negative flu B was negative.  Chest x-ray per radiology today showed masslike opacity present within the right lower lobe malignancy cannot be excluded.  CT angiogram of chest today showed no evidence of pulmonary embolism no acute cardiopulmonary process however there was a mass present within the right lower lobe likely representing malignancy per radiology.  It also  mention there is a large right hilar adenopathy likely representing local metastatic disease with no previous imaging for comparison.    Given a DuoNeb treatment in the emergency department and 1 L fluid bolus.  Sputum culture has been ordered and is pending.  She triggered sepsis for fever and tachycardia however WBC is not elevated.  She is not hypotensive.  Blood cultures ordered.  Lactic ordered and pending.  Oncology hematology and pulmonary have been consulted for further evaluation.    Review of Systems   Constitutional:  Positive for fever.   HENT: Negative.     Eyes: Negative.    Respiratory:  Positive for cough, chest tightness and shortness of breath.    Cardiovascular: Negative.    Gastrointestinal: Negative.    Endocrine: Negative.    Genitourinary: Negative.    Musculoskeletal: Negative.    Skin: Negative.    Allergic/Immunologic: Negative.    Neurological: Negative.    Hematological: Negative.    Psychiatric/Behavioral: Negative.     All other systems reviewed and are negative.      Personal History     Past Medical History:   Diagnosis Date    Cancer        Past Surgical History:   Procedure Laterality Date    MASTECTOMY         Family History: family history is not on file. Otherwise pertinent FHx was reviewed and not pertinent to current issue.    Social History:  reports that she has never smoked. She has never been exposed to tobacco smoke. She has never used smokeless tobacco. She reports that she does not drink alcohol and does not use drugs.    Home Medications:  Prior to Admission Medications       None              Allergies:  No Known Allergies    Objective      Vitals:   Temp:  [97.8 °F (36.6 °C)-100.5 °F (38.1 °C)] 100.5 °F (38.1 °C)  Heart Rate:  [110-129] 123  Resp:  [15-32] 32  BP: (109-172)/() 144/71  Body mass index is 33.2 kg/m².  Physical Exam  Vitals reviewed.   Constitutional:       Appearance: Normal appearance. She is obese.   HENT:      Head: Normocephalic and  atraumatic.      Right Ear: External ear normal.      Left Ear: External ear normal.      Nose: Nose normal.      Mouth/Throat:      Mouth: Mucous membranes are moist.   Eyes:      Extraocular Movements: Extraocular movements intact.   Cardiovascular:      Rate and Rhythm: Regular rhythm. Tachycardia present.      Pulses: Normal pulses.      Heart sounds: Normal heart sounds.   Pulmonary:      Effort: Pulmonary effort is normal.      Breath sounds: Normal breath sounds.   Abdominal:      Palpations: Abdomen is soft.   Genitourinary:     Comments: deferred  Musculoskeletal:         General: Normal range of motion.      Cervical back: Normal range of motion and neck supple.   Skin:     General: Skin is warm and dry.   Neurological:      General: No focal deficit present.      Mental Status: She is alert and oriented to person, place, and time.   Psychiatric:         Mood and Affect: Mood normal.         Behavior: Behavior normal.         Thought Content: Thought content normal.         Judgment: Judgment normal.         Diagnostic Data:  Lab Results (last 24 hours)       Procedure Component Value Units Date/Time    High Sensitivity Troponin T 2Hr [[id number]]  (Normal) Collected: 04/04/24 0044    Specimen: Blood from Arm, Left Updated: 04/04/24 0102     HS Troponin T 13 ng/L      Troponin T Delta 0 ng/L     Narrative:      High Sensitive Troponin T Reference Range:  <14.0 ng/L- Negative Female for AMI  <22.0 ng/L- Negative Male for AMI  >=14 - Abnormal Female indicating possible myocardial injury.  >=22 - Abnormal Male indicating possible myocardial injury.   Clinicians would have to utilize clinical acumen, EKG, Troponin, and serial changes to determine if it is an Acute Myocardial Infarction or myocardial injury due to an underlying chronic condition.         RSV PCR - Swab, Nasopharynx [[id number]]  (Normal) Collected: 04/03/24 2311    Specimen: Swab from Nasopharynx Updated: 04/03/24 2341     RSV, PCR Not Detected     BNP [[id number]]  (Normal) Collected: 04/03/24 2251    Specimen: Blood from Arm, Left Updated: 04/03/24 2333     proBNP 146.8 pg/mL     Narrative:      This assay is used as an aid in the diagnosis of individuals suspected of having heart failure. It can be used as an aid in the diagnosis of acute decompensated heart failure (ADHF) in patients presenting with signs and symptoms of ADHF to the emergency department (ED). In addition, NT-proBNP of <300 pg/mL indicates ADHF is not likely.    Age Range Result Interpretation  NT-proBNP Concentration (pg/mL:      <50             Positive            >450                   Gray                 300-450                    Negative             <300    50-75           Positive            >900                  Gray                300-900                  Negative            <300      >75             Positive            >1800                  Gray                300-1800                  Negative            <300    High Sensitivity Troponin T [[id number]]  (Normal) Collected: 04/03/24 2251    Specimen: Blood from Arm, Left Updated: 04/03/24 2324     HS Troponin T 13 ng/L     Narrative:      High Sensitive Troponin T Reference Range:  <14.0 ng/L- Negative Female for AMI  <22.0 ng/L- Negative Male for AMI  >=14 - Abnormal Female indicating possible myocardial injury.  >=22 - Abnormal Male indicating possible myocardial injury.   Clinicians would have to utilize clinical acumen, EKG, Troponin, and serial changes to determine if it is an Acute Myocardial Infarction or myocardial injury due to an underlying chronic condition.         Comprehensive Metabolic Panel [[id number]]  (Abnormal) Collected: 04/03/24 2251    Specimen: Blood from Arm, Left Updated: 04/03/24 2317     Glucose 131 mg/dL      BUN 19 mg/dL      Creatinine 1.05 mg/dL      Sodium 134 mmol/L      Potassium 3.9 mmol/L      Comment: Slight hemolysis detected by analyzer. Result may be falsely elevated.        Chloride 102  mmol/L      CO2 22.1 mmol/L      Calcium 9.5 mg/dL      Total Protein 7.0 g/dL      Albumin 4.2 g/dL      ALT (SGPT) 15 U/L      AST (SGOT) 22 U/L      Alkaline Phosphatase 80 U/L      Total Bilirubin 0.6 mg/dL      Globulin 2.8 gm/dL      A/G Ratio 1.5 g/dL      BUN/Creatinine Ratio 18.1     Anion Gap 9.9 mmol/L      eGFR 60.2 mL/min/1.73     Narrative:      GFR Normal >60  Chronic Kidney Disease <60  Kidney Failure <15      COVID-19 and FLU A/B PCR, 1 HR TAT - Swab, Nasopharynx [[id number]]  (Normal) Collected: 04/03/24 2237    Specimen: Swab from Nasopharynx Updated: 04/03/24 2301     COVID19 Not Detected     Influenza A PCR Not Detected     Influenza B PCR Not Detected    Narrative:      Fact sheet for providers: https://www.fda.gov/media/589861/download    Fact sheet for patients: https://www.fda.gov/media/734589/download    Test performed by PCR.    CBC & Differential [[id number]]  (Abnormal) Collected: 04/03/24 2251    Specimen: Blood from Arm, Left Updated: 04/03/24 2257    Narrative:      The following orders were created for panel order CBC & Differential.  Procedure                               Abnormality         Status                     ---------                               -----------         ------                     CBC Auto Differential[[id number]]        Abnormal            Final result                 Please view results for these tests on the individual orders.    CBC Auto Differential [[id number]]  (Abnormal) Collected: 04/03/24 2251    Specimen: Blood from Arm, Left Updated: 04/03/24 2257     WBC 9.57 10*3/mm3      RBC 4.64 10*6/mm3      Hemoglobin 13.0 g/dL      Hematocrit 41.3 %      MCV 89.0 fL      MCH 28.0 pg      MCHC 31.5 g/dL      RDW 14.0 %      RDW-SD 46.2 fl      MPV 9.6 fL      Platelets 192 10*3/mm3      Neutrophil % 84.4 %      Lymphocyte % 8.7 %      Monocyte % 6.1 %      Eosinophil % 0.4 %      Basophil % 0.2 %      Immature Grans % 0.2 %      Neutrophils, Absolute 8.08  10*3/mm3      Lymphocytes, Absolute 0.83 10*3/mm3      Monocytes, Absolute 0.58 10*3/mm3      Eosinophils, Absolute 0.04 10*3/mm3      Basophils, Absolute 0.02 10*3/mm3      Immature Grans, Absolute 0.02 10*3/mm3              Imaging Results (Last 24 Hours)       Procedure Component Value Units Date/Time    CT Angiogram Chest Pulmonary Embolism [[id number]] Collected: 04/04/24 0013     Updated: 04/04/24 0017    Narrative:      CT ANGIOGRAM CHEST PULMONARY EMBOLISM    Date of Exam: 4/3/2024 11:51 PM EDT    Indication: Pulmonary embolism (PE) suspected, unknown D-dimer.    Comparison: 4/3/2024.    Technique: Axial CT images were obtained of the chest after the uneventful intravenous administration of iodinated contrast utilizing pulmonary embolism protocol.  Sagittal and coronal reconstructions were performed.  Automated exposure control and   iterative reconstruction methods were used.      Findings:    Pulmonary arteries: Adequate opacification of the pulmonary arteries. No evidence of acute pulmonary embolism.    Lungs and Pleura: There is a mass present within the medial right lower lobe measuring up to 3.2 x 4.1 cm (series 4/72). This extends to the right hilar region. Additional conglomerate of nodes is present within the right hilar to right suprahilar region   measuring up to 2.7 x 1.8 cm (series formers 60). No additional adenopathy identified..    Mediastinum/Radha: No mediastinal or hilar lymphadenopathy.    Lymph nodes: No axillary or supraclavicular adenopathy.    Cardiovascular: The cardiac chambers are within normal limits. The pericardium is normal. The aorta and its arch branch vessels are unremarkable.   There is a right subclavian Mediport with the tip in the mid SVC.    Upper Abdomen: The upper abdominal contents are unremarkable.          Bones and Soft Tissue: No suspicious osseous lesion.        Impression:      Impression:    1. No evidence of pulmonary embolism.  2. No acute cardiopulmonary  process. There is a mass present within the right lower lobe likely representing malignancy. There is enlarged right hilar adenopathy likely representing local metastatic disease. No previous imaging available for comparison.  3. Ancillary findings as described above.        Electronically Signed: Martina Benites MD    4/4/2024 12:15 AM EDT    Workstation ID: [id number]    XR Chest 1 View [[id number]] Collected: 04/03/24 2322     Updated: 04/03/24 2325    Narrative:      XR CHEST 1 VW    Date of Exam: 4/3/2024 11:11 PM EDT    Indication: cough    Comparison: None available.    Findings:  Masslike opacity present within the right lower lobe. Right subclavian Mediport present with tip in the mid SVC. Mild patchy airspace changes are present within the bilateral lower lobes. No significant pleural effusion. No pneumothorax. No acute osseous   abnormality identified.      Impression:      Impression:  No previous imaging available for comparison. Masslike opacity present within the right lower lobe. Malignancy cannot be excluded.      Electronically Signed: Martina Benites MD    4/3/2024 11:23 PM EDT    Workstation ID: [id number]              Assessment & Plan        This is a 63 y.o. female with:    Active and Resolved Problems  Active Hospital Problems    Diagnosis  POA    **Abnormal CT of the chest [R93.89]  Yes     Priority: Medium    Dyspnea [R06.00]  Yes     Priority: High    Fever [R50.9]  Yes     Priority: Medium    History of breast cancer [Z85.3]  Not Applicable      Resolved Hospital Problems   No resolved problems to display.     Abnormal CT of the chest per radiology concerning for malignancy with a past medical history of breast cancer, oncology hematology consulted, pulmonary consulted for possible biopsy    Dyspnea, likely secondary to above, WBC not elevated, no acute process on chest x-ray per radiology, sputum culture ordered and pending stable on 2 L oxygen via nasal cannula DuoNebs every 4 hours as needed  shortness of air or wheezing    Fever, acetaminophen 1000 mg every 6 hours as needed    History of breast cancer reports on Arimidex      DVT prophylaxis:  Mechanical DVT prophylaxis orders are present.        The patient desires to be as follows:    CODE STATUS:    Code Status (Patient has no pulse and is not breathing): CPR (Attempt to Resuscitate)  Medical Interventions (Patient has pulse or is breathing): Full Support          Admission Status:  I believe this patient meets observation status.    Expected Length of Stay: pending further evaluation by pulmonary and oncology     PDMP and Medication Dispenses via Sidebar reviewed and consistent with patient reported medications.    I discussed the patient's findings and my recommendations with patient and family.      Signature:     This document has been electronically signed by YESSI Johnson on April 4, 2024 04:57 EDT   [facility], [place], operated by [facility] Hospitalist Team    Electronically signed by Petr De La Fuente MD at 04/04/24 0616          Emergency Department Notes        Rubi Carroll RN at 04/04/24 0325          Patient refuses to be transported to [facility] by EMS. We explained the need for oxygen and to be monitored. Patient and  still refused stating they could drive themselves. Patient signed a refusal of EMS form and again strongly encouraged to go by EMS. Patient addiment to to by private vehicle      Electronically signed by Rubi Carroll RN at 04/04/24 0328       Seble Gama MD at 04/04/24 0205        Procedure Orders    1. ECG 12 Lead [[id number]] ordered by Seble Gama MD                 Subjective  History of Present Illness  63 yof complains of cough, shortness of  breath and feeling fullness in her chest. She reports her symptoms just started over the past few days. She denies fever, chills, or sick contacts. She denies h/o chronic lung diseased. Pt had a portable pulse ox at home that was reading 87%. The patient has had h/o  PE in the past and she reports that this feels similar. She does have history of Invasive Ductal Carcinoma of the breast. This was diagnosed in 2020. She completed neoadjuvant chemotherapy. Bilateral mastectomy with implant reconstruction on 1/15/2021. She then started on Arimidex. Completed left chest wall and regional node radiation. The patient saw her oncologist on March 11th 2024.       Review of Systems   Constitutional: Negative.    HENT:  Positive for congestion.    Respiratory:  Positive for cough and shortness of breath.    Cardiovascular: Negative.    Gastrointestinal: Negative.    Musculoskeletal: Negative.    Skin: Negative.    All other systems reviewed and are negative.    No past medical history on file.    No Known Allergies    No past surgical history on file.    No family history on file.    Social History     Socioeconomic History    Marital status:            Objective  Physical Exam  Vitals reviewed.   Constitutional:       General: She is in acute distress.   HENT:      Head: Normocephalic and atraumatic.      Nose: Nose normal.      Mouth/Throat:      Mouth: Mucous membranes are moist.      Pharynx: Oropharynx is clear.   Eyes:      Extraocular Movements: Extraocular movements intact.      Pupils: Pupils are equal, round, and reactive to light.   Cardiovascular:      Rate and Rhythm: Tachycardia present.      Pulses: Normal pulses.      Heart sounds: Normal heart sounds.   Pulmonary:      Effort: Pulmonary effort is normal.      Breath sounds: Decreased air movement present.   Abdominal:      Palpations: Abdomen is soft.      Tenderness: There is no abdominal tenderness.   Musculoskeletal:         General: No swelling. Normal range of motion.      Cervical back: Normal range of motion and neck supple.   Skin:     General: Skin is warm and dry.      Capillary Refill: Capillary refill takes less than 2 seconds.   Neurological:      Mental Status: She is alert.     ECG 12  Lead      Date/Time: 4/4/2024 2:16 AM    Performed by: Jovanna Mansfield MD  Authorized by: Jovanna Mansfield MD  Previous ECG: no previous ECG available  Rhythm: sinus tachycardia  Rate: tachycardic  BPM: 119  Clinical impression: non-specific ECG and low voltage  Comments: Non specific St changes          ED Course  ED Course as of 04/04/24 0205   Thu Apr 04, 2024   0101 Paged patient's oncologist Dr Farr.  [BM]   0149 Pt has decieded they do not want to go to [facility] where here oncologist is, and request to be admitted to [facility].  [BM]      ED Course User Index  [BM] Jovanna Mansfield MD                                           Medical Decision Making  Problems Addressed:  Hypoxia: complicated acute illness or injury  Lung mass: complicated acute illness or injury    Amount and/or Complexity of Data Reviewed  Labs: ordered.  Radiology: ordered.  ECG/medicine tests: ordered.    Risk  Prescription drug management.  Decision regarding hospitalization.        Final diagnoses:   Lung mass   Hypoxia       ED Disposition  ED Disposition       ED Disposition   Decision to Admit    Condition   --    Comment   Level of Care: Telemetry [5]   Admitting Physician: SAMARIA BOYCE [[id number]]   Attending Physician: JOVANNA MANSFIELD [[id number]]   Patient Class: Observation [104]                 No follow-up provider specified.       Medication List      No changes were made to your prescriptions during this visit.           Electronically signed by Jovanna Mansfield MD at 04/04/24 0420       Igor Dudley, RN at 04/04/24 0138          Patient would like to discontinue the fluids because she does not want to keep getting unhooked to go use the bathroom.  Provider has been informed.      Electronically signed by Igor Dudley, RN at 04/04/24 0139       Vital Signs (last day)       Date/Time Temp Temp src Pulse Resp BP Patient Position SpO2    04/05/24 1329 99.8 (37.7) Oral 114 22 129/70 Lying --    04/05/24 0910 -- --  110 25 129/55 Lying 94    04/05/24 0900 -- -- 112 -- 127/59 -- 95    04/05/24 0850 -- -- 111 27 109/52 Lying 93    04/05/24 0845 -- -- 116 24 130/99 Lying 92    04/05/24 0840 -- -- 118 26 128/97 Lying 96    04/05/24 0719 98.5 (36.9) Oral 105 28 112/73 Sitting 95    04/05/24 0416 98.7 (37.1) Oral 97 21 122/74 Lying 91    04/04/24 2356 98.6 (37) Oral 98 26 121/61 Lying 95    04/04/24 2125 99.4 (37.4) Oral 107 22 132/69 Lying 96    04/04/24 1700 98.1 (36.7) Oral -- 20 130/61 Lying --    04/04/24 1100 98.5 (36.9) Oral -- 30 139/73 Lying --    04/04/24 0700 98.7 (37.1) Oral -- 22 114/61 Lying --    04/04/24 0410 100.5 (38.1) Oral 123 32 144/71 Lying 94    04/04/24 0311 99 (37.2) -- 129 18 161/77 -- 93    04/04/24 0234 -- -- 120 15 172/100 Sitting 92    04/04/24 01:26:22 -- -- 121 18 145/86 Lying 91    04/04/24 0018 99.5 (37.5) -- 110 22 109/55 Lying 90          Oxygen Therapy (last day)       Date/Time SpO2 Device (Oxygen Therapy) Flow (L/min) Oxygen Concentration (%) ETCO2 (mmHg)    04/05/24 1329 -- nasal cannula 2 -- --    04/05/24 1100 -- nasal cannula 2 -- --    04/05/24 0910 94 room air -- -- --    04/05/24 0900 95 -- -- -- --    04/05/24 0850 93 nasal cannula 3 -- 1    04/05/24 0845 92 nasal cannula 3 -- 0    04/05/24 0840 96 simple face mask 6 -- 0    04/05/24 0719 95 nasal prongs 2 -- --    04/05/24 0700 -- nasal cannula 3 -- --    04/05/24 0416 91 nasal cannula 3 -- --    04/05/24 0334 -- nasal cannula 3 -- --    04/05/24 0005 -- nasal cannula 3 -- --    04/04/24 2356 95 nasal cannula 3 -- --    04/04/24 2125 96 nasal cannula 3 -- --    04/04/24 2000 -- nasal cannula 3 -- --    04/04/24 1100 -- nasal cannula 3 -- --    04/04/24 0820 -- nasal cannula 3 -- --    04/04/24 0415 -- nasal cannula 3 -- --    04/04/24 0410 94 nasal cannula 3.5 -- --    04/04/24 0311 93 -- 2 -- --    04/04/24 0234 92 nasal cannula 2 -- --    04/04/24 01:26:22 91 nasal cannula 3 -- --    04/04/24 0018 90 -- 2 -- --           Facility-Administered Medications as of 4/5/2024   Medication Dose Route Frequency Provider Last Rate Last Admin    acetaminophen (TYLENOL) tablet 1,000 mg  1,000 mg Oral Q8H PRN Annamaria Menendez APRN   1,000 mg at 04/05/24 1305    azithromycin (ZITHROMAX) tablet 500 mg  500 mg Oral Q24H Edy Irving MD   500 mg at 04/05/24 1441    butalbital-acetaminophen-caffeine (FIORICET, ESGIC) -40 MG per tablet 1 tablet  1 tablet Oral Q4H PRN Rose Ortiz APRN        [COMPLETED] hydrOXYzine (ATARAX) tablet 25 mg  25 mg Oral Once Annamaria Menendez APRN   25 mg at 04/04/24 0426    [COMPLETED] iopamidol (ISOVUE-370) 76 % injection 100 mL  100 mL Intravenous Once in imaging Seble Gama MD   100 mL at 04/04/24 0009    [COMPLETED] ipratropium-albuterol (DUO-NEB) nebulizer solution 3 mL  3 mL Nebulization Once Seble Gama MD   3 mL at 04/04/24 0105    ipratropium-albuterol (DUO-NEB) nebulizer solution 3 mL  3 mL Nebulization Q4H PRN Annamaria Menendez APRN        oxyCODONE (ROXICODONE) immediate release tablet 5 mg  5 mg Oral Q6H PRN Annamaria Menendez APRN   5 mg at 04/05/24 1305    [COMPLETED] sodium chloride 0.9 % bolus 1,000 mL  1,000 mL Intravenous Once Seble Gama MD   Stopped at 04/04/24 0207    sodium chloride 0.9 % bolus 1,000 mL  1,000 mL Intravenous Once Seble Gama MD        sodium chloride 0.9 % infusion  50 mL/hr Intravenous Continuous Edy Irving MD 50 mL/hr at 04/05/24 1445 50 mL/hr at 04/05/24 1445     Imaging Results (Last 48 Hours)       Procedure Component Value Units Date/Time    CT Angiogram Chest Pulmonary Embolism [[id number]] Collected: 04/04/24 0013     Updated: 04/04/24 0017    Narrative:      CT ANGIOGRAM CHEST PULMONARY EMBOLISM    Date of Exam: 4/3/2024 11:51 PM EDT    Indication: Pulmonary embolism (PE) suspected, unknown D-dimer.    Comparison: 4/3/2024.    Technique: Axial CT images were obtained of the chest after the uneventful  intravenous administration of iodinated contrast utilizing pulmonary embolism protocol.  Sagittal and coronal reconstructions were performed.  Automated exposure control and   iterative reconstruction methods were used.      Findings:    Pulmonary arteries: Adequate opacification of the pulmonary arteries. No evidence of acute pulmonary embolism.    Lungs and Pleura: There is a mass present within the medial right lower lobe measuring up to 3.2 x 4.1 cm (series 4/72). This extends to the right hilar region. Additional conglomerate of nodes is present within the right hilar to right suprahilar region   measuring up to 2.7 x 1.8 cm (series formers 60). No additional adenopathy identified..    Mediastinum/Radha: No mediastinal or hilar lymphadenopathy.    Lymph nodes: No axillary or supraclavicular adenopathy.    Cardiovascular: The cardiac chambers are within normal limits. The pericardium is normal. The aorta and its arch branch vessels are unremarkable.   There is a right subclavian Mediport with the tip in the mid SVC.    Upper Abdomen: The upper abdominal contents are unremarkable.          Bones and Soft Tissue: No suspicious osseous lesion.        Impression:      Impression:    1. No evidence of pulmonary embolism.  2. No acute cardiopulmonary process. There is a mass present within the right lower lobe likely representing malignancy. There is enlarged right hilar adenopathy likely representing local metastatic disease. No previous imaging available for comparison.  3. Ancillary findings as described above.        Electronically Signed: Martina Benites MD    4/4/2024 12:15 AM EDT    Workstation ID: [id number]    XR Chest 1 View [[id number]] Collected: 04/03/24 2322     Updated: 04/03/24 2325    Narrative:      XR CHEST 1 VW    Date of Exam: 4/3/2024 11:11 PM EDT    Indication: cough    Comparison: None available.    Findings:  Masslike opacity present within the right lower lobe. Right subclavian Mediport present with  tip in the mid SVC. Mild patchy airspace changes are present within the bilateral lower lobes. No significant pleural effusion. No pneumothorax. No acute osseous   abnormality identified.      Impression:      Impression:  No previous imaging available for comparison. Masslike opacity present within the right lower lobe. Malignancy cannot be excluded.      Electronically Signed: Martina Benites MD    4/3/2024 11:23 PM EDT    Workstation ID: [id number]             Operative/Procedure Notes (last 24 hours)        Edy Irving MD at 04/05/24 0802          Bronchoscopy Procedure Note    Timeout was done appropriately by staff    Procedure:  EBUS bronchoscopy,   Therapeutic bronchoscopy with clearance of significant mucopurulent secretion  FNA right lower lobe hilar mass utilizing 22-gauge needle x 5  FNA subcarinal node utilizing 22-gauge needle x 1  Bilateral lung washing    Preoperative Diagnosis: Right lower lobe hilar mass    Postoperative Diagnosis:     Right lower lobe hilar mass positive for malignancy    Pneumonia with copious amount mucopurulent secretions    Anesthesia: General Anesthesia    Procedure Details: Patient was consented for the procedure with all risks and benefits of the procedure explained in detail.  Patient was given the opportunity to ask questions and all concerns were answered.  The bronchocope was inserted into the main airway via the endotracheal tube. An anatomical survey was done of the main airways and the subsegmental bronchus to at least the first subsegmental level of all five lobes of both lungs.  The findings are reported below.  A bronchoalveolar lavage was performed using aliquots of normal saline instilled into the airways then aspirated back.    Findings:   All airways were visualized to at least the first subsegment level of all 5 lobes of both lungs.  Airways were of normal size and caliber.    No endobronchial lesions seen.    Significant amount of mucopurulent secretions were  suctioned therapeutically          We switched to EBUS bronchoscopy    We located the right lower lobe hilar mass under direct ultrasound guidance.  5 passes were done utilizing size 22 gauge needle      EBUS guided FNA of right lower lobe hilar mass confirmed malignancy, pathology onsite will further specify if it is breast origin    Then we located the subcarinal node station 7 and utilizing 22-gauge needle we performed 1 FNA                 Patient tolerated procedure well        Estimated Blood Loss:  Minimal           Specimens:  Sent purulent fluid                Complications:  None; patient tolerated the procedure well.           Disposition: PACU - hemodynamically stable.      Patient tolerated the procedure well.    Edy Irving MD  2024  08:40 EDT      Electronically signed by Edy Irving MD at 24 0843       Procedures signed by Edy Irving MD at 24 0835         Procedure Orders    1. Bronchoscopy [[id number]] ordered by Edy Irving MD at 24 0658               [Media Unavailable] Scan on 2024 0834 by Edy Irving MD: BRONCH          Electronically signed by Edy Irving MD at 24 0835          Physician Progress Notes (last 48 hours)        Rose Ortiz APRN at 24 1226              [facility] MEDICINE SERVICE  DAILY PROGRESS NOTE    NAME: Tracy Reddy  : 1960  MRN: [id number]      LOS: 0 days     PROVIDER OF SERVICE: YESSI Gardner    Chief Complaint: Abnormal CT of the chest    Subjective:     Interval History:  History taken from: patient chart  Patient with complaints of headache at this time.  Patient seen and examined while lying in bed no acute adverse events noted    Objective:     Vital Signs  Temp:  [98.1 °F (36.7 °C)-99.4 °F (37.4 °C)] 98.5 °F (36.9 °C)  Heart Rate:  [] 110  Resp:  [20-28] 25  BP: (109-132)/(52-99) 129/55  Flow (L/min):  [2-6] 2   Body mass index is 33.2 kg/m².    Physical Exam  PHYSICAL  EXAM  Constitutional:  Well developed, well nourished, no acute distress, non-toxic appearance   Eyes:  PERRL, conjunctiva normal, EOMI   HENT:  Atraumatic, external ears normal, nose normal, oropharynx moist, no pharyngeal exudates. Neck-normal range of motion, no tenderness, supple, trachea midline  Respiratory:  ctab, non-labored respirations without accessory muscle use  Cardiovascular:  Normal rate, normal rhythm, no murmurs, no gallops, no rubs   GI:  Soft, nondistended, normal bowel sounds, nontender, no organomegaly, no mass, no rebound, no guarding   :  No costovertebral angle tenderness   Musculoskeletal:  No edema, no tenderness, no deformities  Integument:  Well hydrated, no rash   Lymphatic:  No lymphadenopathy noted   Neurologic:  Alert & oriented x 3, CN 2-12 normal, normal motor function, normal sensory function, no focal deficits noted   Psychiatric:  Speech and behavior appropriate      Scheduled Meds   azithromycin, 500 mg, Oral, Q24H  sodium chloride, 1,000 mL, Intravenous, Once       PRN Meds     acetaminophen    ipratropium-albuterol    oxyCODONE   Infusions  sodium chloride, 50 mL/hr, Last Rate: 100 mL/hr (04/05/24 0748)          Diagnostic Data    Results from last 7 days   Lab Units 04/05/24  0415 04/04/24  0606 04/03/24  2251   WBC 10*3/mm3 9.75  --  9.57   HEMOGLOBIN g/dL 12.2  --  13.0   HEMATOCRIT % 39.4  --  41.3   PLATELETS 10*3/mm3 180  --  192   GLUCOSE mg/dL 94   < > 131*   CREATININE mg/dL 0.89   < > 1.05*   BUN mg/dL 11   < > 19   SODIUM mmol/L 140   < > 134*   POTASSIUM mmol/L 4.2   < > 3.9   AST (SGOT) U/L  --   --  22   ALT (SGPT) U/L  --   --  15   ALK PHOS U/L  --   --  80   BILIRUBIN mg/dL  --   --  0.6   ANION GAP mmol/L 13.0   < > 9.9    < > = values in this interval not displayed.       CT Angiogram Chest Pulmonary Embolism    Result Date: 4/4/2024  Impression: 1. No evidence of pulmonary embolism. 2. No acute cardiopulmonary process. There is a mass present within the  right lower lobe likely representing malignancy. There is enlarged right hilar adenopathy likely representing local metastatic disease. No previous imaging available for comparison. 3. Ancillary findings as described above. Electronically Signed: Martina Benites MD  4/4/2024 12:15 AM EDT  Workstation ID: [id number]    XR Chest 1 View    Result Date: 4/3/2024  Impression: No previous imaging available for comparison. Masslike opacity present within the right lower lobe. Malignancy cannot be excluded. Electronically Signed: Martina Benites MD  4/3/2024 11:23 PM EDT  Workstation ID: [id number]       I reviewed the patient's new clinical results.  I reviewed the patient's new imaging results and agree with the interpretation.    Assessment/Plan:     Active and Resolved Problems  Active Hospital Problems    Diagnosis  POA    **Abnormal CT of the chest [R93.89]  Yes    History of breast cancer [Z85.3]  Not Applicable    Dyspnea [R06.00]  Yes    Fever [R50.9]  Yes    Lung mass [R91.8]  Unknown      Resolved Hospital Problems   No resolved problems to display.     Right lung mass  Dyspnea  Fever  - CT of chest noted for mass present within the right lower lobe representing malignancy there is a large right hilar adenopathy likely representing local metastatic disease.  - Bronchoscopy completed.  Please see Dr. Irving's note for further details.  - Pulmonology following  - Cytology results pending  - Oxygen supplementation to keep oxygen saturation greater than 90.  Patient currently on 2 L oxygen.  - DuoNebs every 4 hours as needed for shortness of air or wheezing    Bilateral anterior ductal carcinoma  - Diagnosed in 2020  - Bilateral mastectomy with reconstruction in 2021  - Currently taking Arimidex  - Oncology consulted      Discussed plan of care with patient and family.  Plan is to await results, and recommendations from oncology.  Fioricet ordered for headache.    DVT prophylaxis:  Mechanical DVT prophylaxis orders are  present.         Code status is   Code Status and Medical Interventions:   Ordered at: 24 0454     Code Status (Patient has no pulse and is not breathing):    CPR (Attempt to Resuscitate)     Medical Interventions (Patient has pulse or is breathing):    Full Support       Plan for disposition: Pending clinical process and results.    Time: 30 minutes    Signature: Electronically signed by YESSI Gardner, 24, 12:27 EDT.  [facility] Hospitalist Team    Electronically signed by Rose Ortiz APRN at 24 1236       Lindy Aguero PA-C at 24 0937                Hematology/Oncology Inpatient Progress Note    PATIENT NAME: Tracy Reddy  : 1960  MRN: [id number]    CHIEF COMPLAINT: Productive cough    HISTORY OF PRESENT ILLNESS:    Tracy Reddy is a 63 y.o. female who presented to [facility] on 4/3/2024 with complaints of cough.  She initially presented to [facility] ED with complaints of fever up to 103 °F, productive cough with green and brown sputum, shortness of air and chest tightness that has been ongoing for several days.  She reports she has family members that have been sick at home.  She reports she has also had a history of PE in the past.  Labs show troponin was 13 x 2, .8.  CBC unremarkable.  Respiratory panel was negative. Chest x-ray showed masslike opacity within the right lower lobe.  Sputum cultures and blood cultures have been ordered.  She is a non-smoker.  Pulmonology also has been consulted.     24 CT chest angiogram  Impression:  1. No evidence of pulmonary embolism.  2. No acute cardiopulmonary process. There is a mass present within the right lower lobe likely representing malignancy. There is enlarged right hilar adenopathy likely representing local metastatic disease. No previous imaging available for comparison.        24  Hematology/Oncology was consulted.       From record review: Patient has a  history of bilateral ductal carcinoma diagnosed in 2020.  Right breast IDC, G1, ER positive, WI positive, HER-2 negative. Left breast IDC, G2, ER positive, WI positive, HER-2 negative. Left SLND revealed 1 of 2 lymph nodes positive for macro metastasis. She is status post bilateral mastectomy with reconstruction in 2021.  She is also completed neoadjuvant chemotherapy with 6 cycles of TCHP.  Taxotere and carboplatin with dose reductions at cycle 2 secondary to chemotherapy-induced nausea and vomiting.  Completed adjuvant Herceptin Perjeta for 1 year.  Status post radiation therapy left chest wall and regional nodes, 5 fractions for a total dose of 5000 cGray. Current treatment with hormone blockade with Arimidex.  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  She follows at [facility], reviewed most recent office note from 3/11/2024.    4/5/2024 status post bronchoscopy.  EBUS guided FNA of the right lower lobe hilar mass confirmed malignancy, final pathology pending.   Respiratory panel positive for human metapneumovirus.    Subjective   Patient reports fatigue and shortness of breath. Patient to start azithromycin for pneumonia, cultures pending.     ROS:  Review of Systems   Constitutional:  Positive for fatigue. Negative for chills and fever.   HENT:  Negative for ear pain, mouth sores, nosebleeds and sore throat.    Eyes:  Negative for photophobia and visual disturbance.   Respiratory:  Positive for cough and shortness of breath. Negative for wheezing and stridor.    Cardiovascular:  Negative for chest pain and palpitations.   Gastrointestinal:  Negative for abdominal pain, diarrhea, nausea and vomiting.   Endocrine: Negative for cold intolerance and heat intolerance.   Genitourinary:  Negative for dysuria and hematuria.   Musculoskeletal:  Negative for joint swelling and neck stiffness.   Skin:  Negative for color change and rash.   Neurological:  Negative for seizures and syncope.  "  Hematological:  Negative for adenopathy.   Psychiatric/Behavioral:  Negative for agitation, confusion and hallucinations.         MEDICATIONS:    Scheduled Meds:  azithromycin, 500 mg, Oral, Q24H  sodium chloride, 1,000 mL, Intravenous, Once       Continuous Infusions:  sodium chloride, 50 mL/hr, Last Rate: 100 mL/hr (04/05/24 0748)       PRN Meds:    acetaminophen    atropine    diphenhydrAMINE    diphenhydrAMINE    ePHEDrine Sulfate (Pressors)    hydrALAZINE    HYDROmorphone    HYDROmorphone    ipratropium-albuterol    ipratropium-albuterol    labetalol    lidocaine (cardiac)    naloxone    ondansetron    oxyCODONE    oxyCODONE    oxyCODONE     ALLERGIES:    Allergies   Allergen Reactions    Penicillins Rash       Objective    VITALS:   /55 (BP Location: Left arm, Patient Position: Lying)   Pulse 110   Temp 98.5 °F (36.9 °C) (Oral)   Resp 25   Ht 162.6 cm (64\")   Wt 87.7 kg (193 lb 6.4 oz)   SpO2 94%   BMI 33.20 kg/m²     PHYSICAL EXAM: (performed by MD)  Physical Exam      RECENT LABS:  Lab Results (last 24 hours)       Procedure Component Value Units Date/Time    Respiratory Panel PCR w/COVID-19(SARS-CoV-2) EN/BASSEM/DANNY/PAD/COR/ADAMA In-House, NP Swab in UTM/VTM, 2 HR TAT - Wash, Bronchus [[id number]] Collected: 04/05/24 0803    Specimen: Wash from Bronchus Updated: 04/05/24 0924    Respiratory Culture - Wash, Bronchus [[id number]] Collected: 04/05/24 0803    Specimen: Wash from Bronchus Updated: 04/05/24 0924    Fungus Culture - Wash, Bronchus [[id number]] Collected: 04/05/24 0803    Specimen: Wash from Bronchus Updated: 04/05/24 0924    AFB Culture - Wash, Bronchus [[id number]] Collected: 04/05/24 0803    Specimen: Wash from Bronchus Updated: 04/05/24 0924    Pneumocystis PCR - Wash, Bronchus [[id number]] Collected: 04/05/24 0803    Specimen: Wash from Bronchus Updated: 04/05/24 0924    Respiratory Culture - Sputum, Cough [[id number]] Collected: 04/04/24 1840    Specimen: Sputum from Cough Updated: " 04/05/24 0658     Gram Stain Many (4+) WBCs per low power field      Rare (1+) Epithelial cells per low power field      Moderate (3+) Gram negative bacilli, tiny      Rare (1+) Mixed bacterial morphotypes seen on Gram Stain    Blood Culture - Blood, Arm, Left [[id number]]  (Normal) Collected: 04/04/24 0446    Specimen: Blood from Arm, Left Updated: 04/05/24 0530     Blood Culture No growth at 24 hours    Narrative:      Less than seven (7) mL's of blood was collected.  Insufficient quantity may yield false negative results.    Blood Culture - Blood, Arm, Right [[id number]]  (Normal) Collected: 04/04/24 0501    Specimen: Blood from Arm, Right Updated: 04/05/24 0530     Blood Culture No growth at 24 hours    Narrative:      Less than seven (7) mL's of blood was collected.  Insufficient quantity may yield false negative results.    Basic Metabolic Panel [[id number]]  (Normal) Collected: 04/05/24 0415    Specimen: Blood Updated: 04/05/24 0522     Glucose 94 mg/dL      BUN 11 mg/dL      Creatinine 0.89 mg/dL      Sodium 140 mmol/L      Potassium 4.2 mmol/L      Comment: Slight hemolysis detected by analyzer. Result may be falsely elevated.        Chloride 104 mmol/L      CO2 23.0 mmol/L      Calcium 9.2 mg/dL      BUN/Creatinine Ratio 12.4     Anion Gap 13.0 mmol/L      eGFR 73.4 mL/min/1.73     Narrative:      GFR Normal >60  Chronic Kidney Disease <60  Kidney Failure <15      Protime-INR [[id number]]  (Normal) Collected: 04/05/24 0415    Specimen: Blood Updated: 04/05/24 0521     Protime 10.8 Seconds      INR 0.99    CBC & Differential [[id number]]  (Abnormal) Collected: 04/05/24 0415    Specimen: Blood Updated: 04/05/24 0459    Narrative:      The following orders were created for panel order CBC & Differential.  Procedure                               Abnormality         Status                     ---------                               -----------         ------                     CBC Auto Differential[[id number]]         Abnormal            Final result                 Please view results for these tests on the individual orders.    CBC Auto Differential [[id number]]  (Abnormal) Collected: 04/05/24 0415    Specimen: Blood Updated: 04/05/24 0459     WBC 9.75 10*3/mm3      RBC 4.40 10*6/mm3      Hemoglobin 12.2 g/dL      Hematocrit 39.4 %      MCV 89.5 fL      MCH 27.7 pg      MCHC 31.0 g/dL      RDW 14.3 %      RDW-SD 46.8 fl      MPV 9.4 fL      Platelets 180 10*3/mm3      Neutrophil % 76.2 %      Lymphocyte % 15.6 %      Monocyte % 6.2 %      Eosinophil % 1.4 %      Basophil % 0.3 %      Immature Grans % 0.3 %      Neutrophils, Absolute 7.43 10*3/mm3      Lymphocytes, Absolute 1.52 10*3/mm3      Monocytes, Absolute 0.60 10*3/mm3      Eosinophils, Absolute 0.14 10*3/mm3      Basophils, Absolute 0.03 10*3/mm3      Immature Grans, Absolute 0.03 10*3/mm3      nRBC 0.0 /100 WBC             PENDING RESULTS: FNA biopsy pathology, urine cultures    IMAGING REVIEWED:  CT Angiogram Chest Pulmonary Embolism    Result Date: 4/4/2024  Impression: 1. No evidence of pulmonary embolism. 2. No acute cardiopulmonary process. There is a mass present within the right lower lobe likely representing malignancy. There is enlarged right hilar adenopathy likely representing local metastatic disease. No previous imaging available for comparison. 3. Ancillary findings as described above. Electronically Signed: Martina Benites MD  4/4/2024 12:15 AM EDT  Workstation ID: [id number]    XR Chest 1 View    Result Date: 4/3/2024  Impression: No previous imaging available for comparison. Masslike opacity present within the right lower lobe. Malignancy cannot be excluded. Electronically Signed: Martina Benites MD  4/3/2024 11:23 PM EDT  Workstation ID: [id number]     Assessment & Plan     Tracy Reddy is a 63 y.o. female with history of pulmonary embolism and bilateral invasive ductal carcinoma status post bilateral mastectomy, chemotherapy and radiation treatments, on  Arimidex.  She presented with dyspnea and was found to have a lung mass in the right lower lobe with right hilar and suprahilar lymphadenopathy.     Right lung mass  -CT imaging showed mass present within the medial right lower lobe measuring up to 3.2 x 4.1 cm that extends to the right hilar region.  Additional conglomerate of nodes present within right hilar to right suprahilar region measuring up to 2.7 x 1.8 cm.  -Pulmonology following.  Status post bronchoscopy EBUS 4/5/2024.  Will follow-up results.  -Fever and chills include recurrent metastatic breast cancer versus new lung cancer, which is less likely given that the patient is a non-smoker.  CHEK2 gene mutation does not increase the risk of lung cancer.  -If this returns as breast primary, will need to check ER/IA status.      Bilateral invasive ductal carcinoma  -Diagnosed in 2020. She is status post bilateral mastectomy with reconstruction in 2021.  She is also completed neoadjuvant chemotherapy with 6 cycles of TCHP.  Taxotere and carboplatin with dose reductions at cycle 2 secondary to chemotherapy-induced nausea and vomiting.  There is some confusion about her pathology as some reports show HER2 negative, some show positive.  However she did receive adjuvant Herceptin Perjeta maintenance which likely points towards HER2 positive disease.  She is also known to have CHEK2 mutation.  Completed adjuvant Herceptin Perjeta for 1 year.  Status post radiation therapy left chest wall and regional nodes, 5 fractions for a total dose of 5000 cGray. Current treatment with hormone blockade with Arimidex. She follows at [facility]    Pneumonia  -Respiratory pane positive for human metapneumovirus. Preliminary sputum cultures with growth, too young to evaluate.   -Pulmonology following.    Further recommendations per Dr. Horn       Electronically signed by Lindy Aguero PA-C             Electronically signed by Lindy Aguero PA-C  at 04/05/24 1231       dEy Irving MD at 04/05/24 0739          Daily Progress Note        Abnormal CT of the chest    History of breast cancer    Dyspnea    Fever    Lung mass    Assessment:     Right hilar mass suspicious for malignancy    History of bilateral ductal carcinoma status post bilateral mastectomy 2020 , status post reconstruction 2021    History of pulmonary embolism        Recommendations:        EBUS bronchoscopy completed 4/5/2024    Significant amount of mucopurulent secretions were suctioned therapeutically  We will start empiric p.o. azithromycin and follow bronchial cultures        EBUS guided FNA of right lower lobe hilar mass confirmed malignancy, pathology onsite will further specify if it is breast origin                LOS: 0 days     Subjective         Objective     Vital signs for last 24 hours:  Vitals:    04/04/24 2125 04/04/24 2356 04/05/24 0416 04/05/24 0719   BP: 132/69 121/61 122/74 112/73   BP Location: Right leg Right leg Right leg Left arm   Patient Position: Lying Lying Lying Sitting   Pulse: 107 98 97 105   Resp: 22 26 21 28   Temp: 99.4 °F (37.4 °C) 98.6 °F (37 °C) 98.7 °F (37.1 °C) 98.5 °F (36.9 °C)   TempSrc: Oral Oral Oral Oral   SpO2: 96% 95% 91% 95%   Weight:       Height:           Intake/Output last 3 shifts:  I/O last 3 completed shifts:  In: 1500 [P.O.:500; IV Piggyback:1000]  Out: 200 [Urine:200]  Intake/Output this shift:  No intake/output data recorded.      Radiology  Imaging Results (Last 24 Hours)       ** No results found for the last 24 hours. **            Labs:  Results from last 7 days   Lab Units 04/05/24 0415   WBC 10*3/mm3 9.75   HEMOGLOBIN g/dL 12.2   HEMATOCRIT % 39.4   PLATELETS 10*3/mm3 180     Results from last 7 days   Lab Units 04/05/24  0415 04/04/24  0606 04/03/24  2251   SODIUM mmol/L 140   < > 134*   POTASSIUM mmol/L 4.2   < > 3.9   CHLORIDE mmol/L 104   < > 102   CO2 mmol/L 23.0   < > 22.1   BUN mg/dL 11   < > 19   CREATININE mg/dL 0.89   <  > 1.05*   CALCIUM mg/dL 9.2   < > 9.5   BILIRUBIN mg/dL  --   --  0.6   ALK PHOS U/L  --   --  80   ALT (SGPT) U/L  --   --  15   AST (SGOT) U/L  --   --  22   GLUCOSE mg/dL 94   < > 131*    < > = values in this interval not displayed.         Results from last 7 days   Lab Units 24  2251   ALBUMIN g/dL 4.2     Results from last 7 days   Lab Units 24  0044 24  2251   HSTROP T ng/L 13 13             Results from last 7 days   Lab Units 24  0415   INR  0.99               Meds:   SCHEDULE  sodium chloride, 1,000 mL, Intravenous, Once      Infusions  sodium chloride, 50 mL/hr, Last Rate: 50 mL/hr (24)      PRNs    acetaminophen    ipratropium-albuterol    oxyCODONE    Physical Exam:  Physical Exam  Cardiovascular:      Heart sounds: No murmur heard.     No gallop.   Pulmonary:      Effort: No respiratory distress.      Breath sounds: No stridor. No wheezing, rhonchi or rales.   Chest:      Chest wall: No tenderness.         ROS  Review of Systems   Respiratory:  Positive for cough. Negative for shortness of breath, wheezing and stridor.    Cardiovascular:  Negative for chest pain, palpitations and leg swelling.             Total time spent with patient greater than: 45 Minutes    Electronically signed by Edy Irving MD at 24 0839       Nicholas Vasques MD at 24 1729           [facility]     Progress Note    Patient Name: Tracy Reddy  : 1960  MRN: [id number]  Primary Care Physician:  Provider, No Known  Date of admission: 4/3/2024  Service date and time: 24 17:29 EDT  Subjective   Subjective     Chief Complaint: shortness of breath    HPI:  Patient denies any shortness of breath or any chest pain at this time.      Objective   Objective     Vitals:   Temp:  [97.8 °F (36.6 °C)-100.5 °F (38.1 °C)] 98.5 °F (36.9 °C)  Heart Rate:  [110-129] 123  Resp:  [15-32] 30  BP: (109-172)/() 139/73  Flow (L/min):  [2-3.5] 3  Physical Exam    Constitutional:  Awake, alert   Eyes: PERRLA, sclerae anicteric, no conjunctival injection   HENT: NCAT, mucous membranes moist   Neck: Supple, no thyromegaly, no lymphadenopathy, trachea midline   Respiratory: Clear to auscultation bilaterally, nonlabored respirations    Cardiovascular: RRR, no murmurs, rubs, or gallops, palpable pedal pulses bilaterally   Gastrointestinal: Positive bowel sounds, soft, nontender, nondistended   Musculoskeletal: No bilateral ankle edema, no clubbing or cyanosis to extremities   Psychiatric: Appropriate affect, cooperative   Neurologic: Oriented x 3, strength symmetric in all extremities, Cranial Nerves grossly intact to confrontation, speech clear   Skin: No rashes     Result Review    Result Review:  I have personally reviewed the results from the time of this admission to 4/4/2024 17:29 EDT and agree with these findings:  [x]  Laboratory list / accordion  []  Microbiology  []  Radiology  []  EKG/Telemetry   []  Cardiology/Vascular   []  Pathology  []  Old records  []  Other:  Most notable findings include: Glucose 125, BUN 16, creatinine 1.01, sodium 138, potassium 4.0, chloride 103, CO2 24      Assessment & Plan   Assessment / Plan       Active Hospital Problems:  Active Hospital Problems    Diagnosis     **Abnormal CT of the chest     History of breast cancer     Dyspnea     Fever     Lung mass      Plan:      Abnormal CT of the chest per radiology concerning for malignancy with a past medical history of breast cancer, oncology hematology consulted, pulmonary consulted, patient to have bronchoscopy and EUS in the morningDyspnea, likely secondary to above, WBC not elevated, no acute process on chest x-ray per radiology, sputum culture ordered and pending stable on 2 L oxygen via nasal cannula DuoNebs every 4 hours as needed shortness of air or wheezing     Fever, acetaminophen 1000 mg every 6 hours as needed     History of breast cancer reports on Arimidex     DVT prophylaxis:  Mechanical DVT  prophylaxis orders are present.        CODE STATUS:   Code Status (Patient has no pulse and is not breathing): CPR (Attempt to Resuscitate)  Medical Interventions (Patient has pulse or is breathing): Full Support    Disposition:  I expect patient to be discharged in 2 days .    Nicholas Vasques MD    Electronically signed by Nicholas Vasques MD at 24 1743          Consult Notes (last 48 hours)        Pippa Horn MD at 24 0900        Consult Orders    1. Hematology & Oncology Inpatient Consult [[id number]] ordered by Annamaria Menendez APRN at 24 0406                         Hematology/Oncology Inpatient Consultation    Patient name: Tracy Reddy  : 1960  MRN: [id number]  Referring Provider: YESSI Johnson  Reason for Consultation: History of breast cancer, abnormal chest CT    Chief complaint: Productive cough    History of present illness:    Tracy Reddy is a 63 y.o. female who presented to [facility] on 4/3/2024 with complaints of cough.  She initially presented to [facility] ED with complaints of fever up to 103 °F, productive cough with green and brown sputum, shortness of air and chest tightness that has been ongoing for several days.  She reports she has family members that have been sick at home.  She reports she has also had a history of PE in the past.  Labs show troponin was 13 x 2, .8.  CBC unremarkable.  Respiratory panel was negative. Chest x-ray showed masslike opacity within the right lower lobe.  Sputum cultures and blood cultures have been ordered.  She is a non-smoker.  Pulmonology also has been consulted.    24 CT chest angiogram  Impression:  1. No evidence of pulmonary embolism.  2. No acute cardiopulmonary process. There is a mass present within the right lower lobe likely representing malignancy. There is enlarged right hilar adenopathy likely representing local metastatic disease. No previous imaging available  for comparison.      04/04/24  Hematology/Oncology was consulted.      From record review: Patient has a history of bilateral ductal carcinoma diagnosed in 2020.  Right breast IDC, G1, ER positive, KS positive, HER-2 negative. Left breast IDC, G2, ER positive, KS positive, HER-2 negative. Left SLND revealed 1 of 2 lymph nodes positive for macro metastasis. She is status post bilateral mastectomy with reconstruction in 2021.  She is also completed neoadjuvant chemotherapy with 6 cycles of TCHP.  Taxotere and carboplatin with dose reductions at cycle 2 secondary to chemotherapy-induced nausea and vomiting.  Completed adjuvant Herceptin Perjeta for 1 year.  Status post radiation therapy left chest wall and regional nodes, 5 fractions for a total dose of 5000 cGray. Current treatment with hormone blockade with Arimidex.  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  She follows at [facility], reviewed most recent office note from 3/11/2024.    He/She  has a past medical history of Cancer.    PCP: Provider, No Known    History:  Past Medical History:   Diagnosis Date    Cancer    ,   Past Surgical History:   Procedure Laterality Date    MASTECTOMY     , History reviewed. No pertinent family history.,   Social History     Tobacco Use    Smoking status: Never     Passive exposure: Never    Smokeless tobacco: Never   Vaping Use    Vaping status: Never Used   Substance Use Topics    Alcohol use: Never    Drug use: Never   ,   Medications Prior to Admission   Medication Sig Dispense Refill Last Dose    anastrozole (ARIMIDEX) 1 MG tablet Take 1 tablet by mouth Daily.   4/3/2024   , Scheduled Meds:  sodium chloride, 1,000 mL, Intravenous, Once    , Continuous Infusions:   , PRN Meds:    acetaminophen    ipratropium-albuterol    oxyCODONE   Allergies:  Patient has no known allergies.    Subjective     ROS:  Review of Systems   Constitutional:  Positive for fatigue. Negative for chills and fever.   HENT:   "Negative for congestion, ear pain, mouth sores, nosebleeds and sore throat.    Eyes:  Negative for photophobia and visual disturbance.   Respiratory:  Positive for cough and shortness of breath. Negative for chest tightness, wheezing and stridor.    Cardiovascular:  Negative for chest pain and palpitations.   Gastrointestinal:  Negative for abdominal pain, diarrhea, nausea and vomiting.   Endocrine: Negative for cold intolerance and heat intolerance.   Genitourinary:  Negative for dysuria and hematuria.   Musculoskeletal:  Negative for joint swelling and neck stiffness.   Skin:  Negative for color change and rash.   Neurological:  Negative for seizures and syncope.   Hematological:  Negative for adenopathy.   Psychiatric/Behavioral:  Negative for agitation, confusion and hallucinations.         Objective   Vital Signs:   /61 (BP Location: Right leg, Patient Position: Lying)   Pulse (!) 123   Temp 98.7 °F (37.1 °C) (Oral)   Resp 22   Ht 162.6 cm (64\")   Wt 87.7 kg (193 lb 6.4 oz)   SpO2 94%   BMI 33.20 kg/m²     Physical Exam: (performed by MD)  Physical Exam  Constitutional:       Appearance: Normal appearance.   HENT:      Head: Normocephalic and atraumatic.   Eyes:      Pupils: Pupils are equal, round, and reactive to light.   Cardiovascular:      Rate and Rhythm: Normal rate and regular rhythm.      Pulses: Normal pulses.      Heart sounds: No murmur heard.  Pulmonary:      Effort: Pulmonary effort is normal.      Breath sounds: Rhonchi present.   Abdominal:      General: There is no distension.      Palpations: Abdomen is soft. There is no mass.      Tenderness: There is no abdominal tenderness.   Musculoskeletal:         General: Normal range of motion.      Cervical back: Normal range of motion.   Skin:     General: Skin is warm.   Neurological:      General: No focal deficit present.      Mental Status: She is alert.   Psychiatric:         Mood and Affect: Mood normal.         Results " Review:  Lab Results (last 48 hours)       Procedure Component Value Units Date/Time    Basic Metabolic Panel [[id number]]  (Abnormal) Collected: 04/04/24 0606    Specimen: Blood from Arm, Left Updated: 04/04/24 0706     Glucose 125 mg/dL      BUN 16 mg/dL      Creatinine 1.01 mg/dL      Sodium 138 mmol/L      Potassium 4.0 mmol/L      Chloride 103 mmol/L      CO2 24.0 mmol/L      Calcium 8.9 mg/dL      BUN/Creatinine Ratio 15.8     Anion Gap 11.0 mmol/L      eGFR 63.1 mL/min/1.73     Narrative:      GFR Normal >60  Chronic Kidney Disease <60  Kidney Failure <15      Lactic Acid, Plasma [[id number]]  (Normal) Collected: 04/04/24 0506    Specimen: Blood from Arm, Left Updated: 04/04/24 0549     Lactate 1.3 mmol/L     Blood Culture - Blood, Arm, Right [[id number]] Collected: 04/04/24 0501    Specimen: Blood from Arm, Right Updated: 04/04/24 0515    Blood Culture - Blood, Arm, Left [[id number]] Collected: 04/04/24 0446    Specimen: Blood from Arm, Left Updated: 04/04/24 0515    High Sensitivity Troponin T 2Hr [[id number]]  (Normal) Collected: 04/04/24 0044    Specimen: Blood from Arm, Left Updated: 04/04/24 0102     HS Troponin T 13 ng/L      Troponin T Delta 0 ng/L     Narrative:      High Sensitive Troponin T Reference Range:  <14.0 ng/L- Negative Female for AMI  <22.0 ng/L- Negative Male for AMI  >=14 - Abnormal Female indicating possible myocardial injury.  >=22 - Abnormal Male indicating possible myocardial injury.   Clinicians would have to utilize clinical acumen, EKG, Troponin, and serial changes to determine if it is an Acute Myocardial Infarction or myocardial injury due to an underlying chronic condition.         RSV PCR - Swab, Nasopharynx [[id number]]  (Normal) Collected: 04/03/24 2311    Specimen: Swab from Nasopharynx Updated: 04/03/24 2341     RSV, PCR Not Detected    BNP [[id number]]  (Normal) Collected: 04/03/24 2251    Specimen: Blood from Arm, Left Updated: 04/03/24 2333     proBNP 146.8 pg/mL      Narrative:      This assay is used as an aid in the diagnosis of individuals suspected of having heart failure. It can be used as an aid in the diagnosis of acute decompensated heart failure (ADHF) in patients presenting with signs and symptoms of ADHF to the emergency department (ED). In addition, NT-proBNP of <300 pg/mL indicates ADHF is not likely.    Age Range Result Interpretation  NT-proBNP Concentration (pg/mL:      <50             Positive            >450                   Gray                 300-450                    Negative             <300    50-75           Positive            >900                  Gray                300-900                  Negative            <300      >75             Positive            >1800                  Gray                300-1800                  Negative            <300    High Sensitivity Troponin T [[id number]]  (Normal) Collected: 04/03/24 2251    Specimen: Blood from Arm, Left Updated: 04/03/24 2324     HS Troponin T 13 ng/L     Narrative:      High Sensitive Troponin T Reference Range:  <14.0 ng/L- Negative Female for AMI  <22.0 ng/L- Negative Male for AMI  >=14 - Abnormal Female indicating possible myocardial injury.  >=22 - Abnormal Male indicating possible myocardial injury.   Clinicians would have to utilize clinical acumen, EKG, Troponin, and serial changes to determine if it is an Acute Myocardial Infarction or myocardial injury due to an underlying chronic condition.         Comprehensive Metabolic Panel [[id number]]  (Abnormal) Collected: 04/03/24 2251    Specimen: Blood from Arm, Left Updated: 04/03/24 2317     Glucose 131 mg/dL      BUN 19 mg/dL      Creatinine 1.05 mg/dL      Sodium 134 mmol/L      Potassium 3.9 mmol/L      Comment: Slight hemolysis detected by analyzer. Result may be falsely elevated.        Chloride 102 mmol/L      CO2 22.1 mmol/L      Calcium 9.5 mg/dL      Total Protein 7.0 g/dL      Albumin 4.2 g/dL      ALT (SGPT) 15 U/L      AST  (SGOT) 22 U/L      Alkaline Phosphatase 80 U/L      Total Bilirubin 0.6 mg/dL      Globulin 2.8 gm/dL      A/G Ratio 1.5 g/dL      BUN/Creatinine Ratio 18.1     Anion Gap 9.9 mmol/L      eGFR 60.2 mL/min/1.73     Narrative:      GFR Normal >60  Chronic Kidney Disease <60  Kidney Failure <15      COVID-19 and FLU A/B PCR, 1 HR TAT - Swab, Nasopharynx [[id number]]  (Normal) Collected: 04/03/24 2237    Specimen: Swab from Nasopharynx Updated: 04/03/24 2301     COVID19 Not Detected     Influenza A PCR Not Detected     Influenza B PCR Not Detected    Narrative:      Fact sheet for providers: https://www.fda.gov/media/841661/download    Fact sheet for patients: https://www.fda.gov/media/973591/download    Test performed by PCR.    CBC & Differential [[id number]]  (Abnormal) Collected: 04/03/24 2251    Specimen: Blood from Arm, Left Updated: 04/03/24 2257    Narrative:      The following orders were created for panel order CBC & Differential.  Procedure                               Abnormality         Status                     ---------                               -----------         ------                     CBC Auto Differential[[id number]]        Abnormal            Final result                 Please view results for these tests on the individual orders.    CBC Auto Differential [[id number]]  (Abnormal) Collected: 04/03/24 2251    Specimen: Blood from Arm, Left Updated: 04/03/24 2257     WBC 9.57 10*3/mm3      RBC 4.64 10*6/mm3      Hemoglobin 13.0 g/dL      Hematocrit 41.3 %      MCV 89.0 fL      MCH 28.0 pg      MCHC 31.5 g/dL      RDW 14.0 %      RDW-SD 46.2 fl      MPV 9.6 fL      Platelets 192 10*3/mm3      Neutrophil % 84.4 %      Lymphocyte % 8.7 %      Monocyte % 6.1 %      Eosinophil % 0.4 %      Basophil % 0.2 %      Immature Grans % 0.2 %      Neutrophils, Absolute 8.08 10*3/mm3      Lymphocytes, Absolute 0.83 10*3/mm3      Monocytes, Absolute 0.58 10*3/mm3      Eosinophils, Absolute 0.04 10*3/mm3       Basophils, Absolute 0.02 10*3/mm3      Immature Grans, Absolute 0.02 10*3/mm3              Pending Results:     Imaging Reviewed:   CT Angiogram Chest Pulmonary Embolism    Result Date: 4/4/2024  Impression: 1. No evidence of pulmonary embolism. 2. No acute cardiopulmonary process. There is a mass present within the right lower lobe likely representing malignancy. There is enlarged right hilar adenopathy likely representing local metastatic disease. No previous imaging available for comparison. 3. Ancillary findings as described above. Electronically Signed: Martina Benites MD  4/4/2024 12:15 AM EDT  Workstation ID: [id number]    XR Chest 1 View    Result Date: 4/3/2024  Impression: No previous imaging available for comparison. Masslike opacity present within the right lower lobe. Malignancy cannot be excluded. Electronically Signed: Martina Benites MD  4/3/2024 11:23 PM EDT  Workstation ID: [id number]         Assessment & Plan     Tracy Reddy is a 63 y.o. female with history of pulmonary embolism and bilateral invasive ductal carcinoma status post bilateral mastectomy, chemotherapy and radiation treatments, on Arimidex.  She presented with dyspnea and was found to have a lung mass in the right lower lobe with right hilar and suprahilar lymphadenopathy.    Right lung mass  -CT imaging showed mass present within the medial right lower lobe measuring up to 3.2 x 4.1 cm that extends to the right hilar region.  Additional conglomerate of nodes present within right hilar to right suprahilar region measuring up to 2.7 x 1.8 cm.  -Pulmonology consultation for possible bronchoscopy/EBUS  -If this returns as breast primary, will need to check ER/MT status.     Bilateral invasive ductal carcinoma  -Diagnosed in 2020. She is status post bilateral mastectomy with reconstruction in 2021.  She is also completed neoadjuvant chemotherapy with 6 cycles of TCHP.  Taxotere and carboplatin with dose reductions at cycle 2 secondary to  chemotherapy-induced nausea and vomiting.  Completed adjuvant Herceptin Perjeta for 1 year.  Status post radiation therapy left chest wall and regional nodes, 5 fractions for a total dose of 5000 cGray. Current treatment with hormone blockade with Arimidex.  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  She follows at [facility]    Further recommendations per Dr. Horn    Electronically signed by Lindy Aguero PA-C, 04/04/24    Patient seen and examined agree with assessment plan    Patient is a 63-year-old female with history of breast cancer who has been treated with bilateral mastectomy, chemotherapy, radiation, maintenance anti-HER2 treatment and now on Arimidex.  Patient had ER/NH positive HER2/nancy positive disease.  There is some confusion about her pathology as some reports show HER2 negative some show positive however she did receive TCHP and also adjuvant Herceptin Perjeta maintenance which likely points towards her HER2 positive disease.  She also is known to have a Chek 2 mutation.  Now patient presents after she had a respiratory illness which initially her  had it and she started to have symptoms since earlier this week.  Progressively getting worse.    CT imaging with right lung mass within the medial right lobe measuring up to 4.1 cm this extends to the right hilar region.  Additional conglomerate of nodes present within the right hilar to right suprahilar region measuring up to 2.7 cm    Pulmonology has been consulted plan for bronchoscopy EBUS tomorrow.  Will follow-up on results.  Differentials include recurrent metastatic breast cancer versus new lung cancer which is less likely given that patient is a non-smoker.  CHEK2 gene mutation does not increase the risk of lung cancer.    Thank you for this consult. We will be happy to follow along with you.       I have obtained complete history and perform physical exam along with review of labs, imaging.  I have  completed the majority and substantive portion of medical decision making    Pippa Horn MD        Electronically signed by Pippa Horn MD at 04/04/24 1636       Edy Irving MD at 04/04/24 0613        Consult Orders    1. Inpatient Pulmonology Consult [[id number]] ordered by Annamaria Menendez APRN at 04/04/24 0410                 Group: [facility]         CONSULT NOTE    Patient Identification:  Tracy Reddy  63 y.o.  female  1960  [id number]            Requesting physician: Attending physician    Reason for Consultation:  lung mass        History of Present Illness:    63-year-old female admitted on 4/3/2024 with fever of 103 and productive cough of green sputum,  CT chest showed large right hilar mass suspicious for malignancy  History of bilateral ductal carcinoma status post bilateral mastectomy 2020 status post reconstruction 2021  History of pulmonary embolism      Assessment:    Right hilar mass suspicious for malignancy  History of bilateral ductal carcinoma status post bilateral mastectomy 2020 status post reconstruction 2021  History of pulmonary embolism      Recommendations:      EBUS bronchoscopy in a.m.     Check PT INR    Review of Sytems:  Review of Systems   Respiratory:  Positive for cough and shortness of breath. Negative for wheezing and stridor.    Cardiovascular:  Negative for chest pain, palpitations and leg swelling.       Past Medical History:  Past Medical History:   Diagnosis Date    Cancer        Past Surgical History:  Past Surgical History:   Procedure Laterality Date    MASTECTOMY          Home Meds:  Medications Prior to Admission   Medication Sig Dispense Refill Last Dose    anastrozole (ARIMIDEX) 1 MG tablet Take 1 tablet by mouth Daily.   4/3/2024       Allergies:  No Known Allergies    Social History:   Social History     Socioeconomic History    Marital status:    Tobacco Use    Smoking status: Never     Passive exposure: Never    Smokeless  "tobacco: Never   Vaping Use    Vaping status: Never Used   Substance and Sexual Activity    Alcohol use: Never    Drug use: Never    Sexual activity: Defer       Family History:  History reviewed. No pertinent family history.    Physical Exam:  /71 (BP Location: Right arm, Patient Position: Lying)   Pulse (!) 123   Temp 100.5 °F (38.1 °C) (Oral)   Resp (!) 32   Ht 162.6 cm (64\")   Wt 87.7 kg (193 lb 6.4 oz)   SpO2 94%   BMI 33.20 kg/m²  Body mass index is 33.2 kg/m². 94% 87.7 kg (193 lb 6.4 oz)  Physical Exam  Cardiovascular:      Heart sounds: Murmur heard.      No gallop.   Pulmonary:      Effort: No respiratory distress.      Breath sounds: No stridor. Rhonchi and rales present. No wheezing.   Chest:      Chest wall: No tenderness.         LABS:  Lab Results   Component Value Date    CALCIUM 9.5 04/03/2024     Results from last 7 days   Lab Units 04/03/24  2251   SODIUM mmol/L 134*   POTASSIUM mmol/L 3.9   CHLORIDE mmol/L 102   CO2 mmol/L 22.1   BUN mg/dL 19   CREATININE mg/dL 1.05*   GLUCOSE mg/dL 131*   CALCIUM mg/dL 9.5   WBC 10*3/mm3 9.57   HEMOGLOBIN g/dL 13.0   PLATELETS 10*3/mm3 192   ALT (SGPT) U/L 15   AST (SGOT) U/L 22   PROBNP pg/mL 146.8     Lab Results   Component Value Date    TROPONINT 13 04/04/2024     Results from last 7 days   Lab Units 04/04/24  0044 04/03/24  2251   HSTROP T ng/L 13 13         Results from last 7 days   Lab Units 04/04/24  0506   LACTATE mmol/L 1.3         Results from last 7 days   Lab Units 04/03/24  2311 04/03/24  2237   INFLUENZA B PCR   --  Not Detected   INFLUENZA A PCR   --  Not Detected   RSV, PCR  Not Detected  --              No results found for: \"TSH\"  Estimated Creatinine Clearance: 59.5 mL/min (A) (by C-G formula based on SCr of 1.05 mg/dL (H)).         Imaging:  Imaging Results (Last 24 Hours)       Procedure Component Value Units Date/Time    CT Angiogram Chest Pulmonary Embolism [[id number]] Collected: 04/04/24 0013     Updated: 04/04/24 0017    " Narrative:      CT ANGIOGRAM CHEST PULMONARY EMBOLISM    Date of Exam: 4/3/2024 11:51 PM EDT    Indication: Pulmonary embolism (PE) suspected, unknown D-dimer.    Comparison: 4/3/2024.    Technique: Axial CT images were obtained of the chest after the uneventful intravenous administration of iodinated contrast utilizing pulmonary embolism protocol.  Sagittal and coronal reconstructions were performed.  Automated exposure control and   iterative reconstruction methods were used.      Findings:    Pulmonary arteries: Adequate opacification of the pulmonary arteries. No evidence of acute pulmonary embolism.    Lungs and Pleura: There is a mass present within the medial right lower lobe measuring up to 3.2 x 4.1 cm (series 4/72). This extends to the right hilar region. Additional conglomerate of nodes is present within the right hilar to right suprahilar region   measuring up to 2.7 x 1.8 cm (series formers 60). No additional adenopathy identified..    Mediastinum/Radha: No mediastinal or hilar lymphadenopathy.    Lymph nodes: No axillary or supraclavicular adenopathy.    Cardiovascular: The cardiac chambers are within normal limits. The pericardium is normal. The aorta and its arch branch vessels are unremarkable.   There is a right subclavian Mediport with the tip in the mid SVC.    Upper Abdomen: The upper abdominal contents are unremarkable.          Bones and Soft Tissue: No suspicious osseous lesion.        Impression:      Impression:    1. No evidence of pulmonary embolism.  2. No acute cardiopulmonary process. There is a mass present within the right lower lobe likely representing malignancy. There is enlarged right hilar adenopathy likely representing local metastatic disease. No previous imaging available for comparison.  3. Ancillary findings as described above.        Electronically Signed: Martina Benites MD    4/4/2024 12:15 AM EDT    Workstation ID: [id number]    XR Chest 1 View [[id number]] Collected: 04/03/24  2322     Updated: 04/03/24 2325    Narrative:      XR CHEST 1 VW    Date of Exam: 4/3/2024 11:11 PM EDT    Indication: cough    Comparison: None available.    Findings:  Masslike opacity present within the right lower lobe. Right subclavian Mediport present with tip in the mid SVC. Mild patchy airspace changes are present within the bilateral lower lobes. No significant pleural effusion. No pneumothorax. No acute osseous   abnormality identified.      Impression:      Impression:  No previous imaging available for comparison. Masslike opacity present within the right lower lobe. Malignancy cannot be excluded.      Electronically Signed: Martina Benites MD    4/3/2024 11:23 PM EDT    Workstation ID: [id number]              Current Meds:   SCHEDULE  sodium chloride, 1,000 mL, Intravenous, Once      Infusions     PRNs    acetaminophen    ipratropium-albuterol    oxyCODONE        Edy Irving MD  4/4/2024  06:13 EDT      Much of this encounter note is an electronic transcription/translation of spoken language to printed text using Dragon Software.    Electronically signed by Edy Irving MD at 04/04/24 1524

## 2024-04-05 NOTE — ANESTHESIA POSTPROCEDURE EVALUATION
Patient: Tracy Reddy    Procedure Summary       Date: 04/05/24 Room / Location: [facility] ENDOSCOPY 3 / [facility] ENDOSCOPY    Anesthesia Start: 0748 Anesthesia Stop: 0840    Procedure: BRONCHOSCOPY WITH ENDOBRONCHIAL ULTRASOUND, bilateral bronchial washings, fine needle aspiration x 2 areas (Bronchus) Diagnosis:       Lung mass      (Lung mass [R91.8])    Surgeons: Edy Irving MD Provider: Suhas Barragan MD    Anesthesia Type: general ASA Status: 3            Anesthesia Type: general    Vitals  Vitals Value Taken Time   /55 04/05/24 0910   Temp     Pulse 115 04/05/24 0919   Resp 25 04/05/24 0910   SpO2 92 % 04/05/24 0919   Vitals shown include unfiled device data.        Post Anesthesia Care and Evaluation    Patient location during evaluation: PACU  Patient participation: complete - patient participated  Level of consciousness: awake  Pain scale: See nurse's notes for pain score.  Pain management: adequate    Airway patency: patent  Anesthetic complications: No anesthetic complications  PONV Status: none  Cardiovascular status: acceptable  Respiratory status: acceptable and spontaneous ventilation  Hydration status: acceptable    Comments: Patient seen and examined postoperatively; vital signs stable; SpO2 greater than or equal to 90%; cardiopulmonary status stable; nausea/vomiting adequately controlled; pain adequately controlled; no apparent anesthesia complications; patient discharged from anesthesia care when discharge criteria were met

## 2024-04-05 NOTE — PROGRESS NOTES
Hematology/Oncology Inpatient Progress Note    PATIENT NAME: Tracy Reddy  : 1960  MRN: [id number]    CHIEF COMPLAINT: Productive cough    HISTORY OF PRESENT ILLNESS:    Tracy Reddy is a 63 y.o. female who presented to [facility] on 4/3/2024 with complaints of cough.  She initially presented to [facility] ED with complaints of fever up to 103 °F, productive cough with green and brown sputum, shortness of air and chest tightness that has been ongoing for several days.  She reports she has family members that have been sick at home.  She reports she has also had a history of PE in the past.  Labs show troponin was 13 x 2, .8.  CBC unremarkable.  Respiratory panel was negative. Chest x-ray showed masslike opacity within the right lower lobe.  Sputum cultures and blood cultures have been ordered.  She is a non-smoker.  Pulmonology also has been consulted.     24 CT chest angiogram  Impression:  1. No evidence of pulmonary embolism.  2. No acute cardiopulmonary process. There is a mass present within the right lower lobe likely representing malignancy. There is enlarged right hilar adenopathy likely representing local metastatic disease. No previous imaging available for comparison.        24  Hematology/Oncology was consulted.       From record review: Patient has a history of bilateral ductal carcinoma diagnosed in .  Right breast IDC, G1, ER positive, CA positive, HER-2 negative. Left breast IDC, G2, ER positive, CA positive, HER-2 negative. Left SLND revealed 1 of 2 lymph nodes positive for macro metastasis. She is status post bilateral mastectomy with reconstruction in .  She is also completed neoadjuvant chemotherapy with 6 cycles of TCHP.  Taxotere and carboplatin with dose reductions at cycle 2 secondary to chemotherapy-induced nausea and vomiting.  Completed adjuvant Herceptin Perjeta for 1 year.  Status post radiation therapy left chest wall and  regional nodes, 5 fractions for a total dose of 5000 cGray. Current treatment with hormone blockade with Arimidex.  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  She follows at [facility], reviewed most recent office note from 3/11/2024.    4/5/2024 status post bronchoscopy.  EBUS guided FNA of the right lower lobe hilar mass confirmed malignancy, final pathology pending.   Respiratory panel positive for human metapneumovirus.    Subjective   Patient reports fatigue and shortness of breath. Patient to start azithromycin for pneumonia, cultures pending.     ROS:  Review of Systems   Constitutional:  Positive for fatigue. Negative for chills and fever.   HENT:  Negative for ear pain, mouth sores, nosebleeds and sore throat.    Eyes:  Negative for photophobia, pain and visual disturbance.   Respiratory:  Positive for cough and shortness of breath. Negative for wheezing and stridor.    Cardiovascular:  Negative for chest pain and palpitations.   Gastrointestinal:  Negative for abdominal pain, diarrhea, nausea and vomiting.   Endocrine: Negative for cold intolerance and heat intolerance.   Genitourinary:  Negative for dysuria and hematuria.   Musculoskeletal:  Negative for joint swelling and neck stiffness.   Skin:  Negative for color change and rash.   Neurological:  Negative for seizures and syncope.   Hematological:  Negative for adenopathy.   Psychiatric/Behavioral:  Negative for agitation, confusion and hallucinations.         MEDICATIONS:    Scheduled Meds:  azithromycin, 500 mg, Oral, Q24H  sodium chloride, 1,000 mL, Intravenous, Once       Continuous Infusions:  sodium chloride, 50 mL/hr, Last Rate: 100 mL/hr (04/05/24 0748)       PRN Meds:    acetaminophen    atropine    diphenhydrAMINE    diphenhydrAMINE    ePHEDrine Sulfate (Pressors)    hydrALAZINE    HYDROmorphone    HYDROmorphone    ipratropium-albuterol    ipratropium-albuterol    labetalol    lidocaine (cardiac)    naloxone     "ondansetron    oxyCODONE    oxyCODONE    oxyCODONE     ALLERGIES:    Allergies   Allergen Reactions    Penicillins Rash       Objective    VITALS:   /55 (BP Location: Left arm, Patient Position: Lying)   Pulse 110   Temp 98.5 °F (36.9 °C) (Oral)   Resp 25   Ht 162.6 cm (64\")   Wt 87.7 kg (193 lb 6.4 oz)   SpO2 94%   BMI 33.20 kg/m²     PHYSICAL EXAM: (performed by MD)  Physical Exam  Constitutional:       Appearance: Normal appearance.   HENT:      Head: Normocephalic and atraumatic.   Eyes:      Pupils: Pupils are equal, round, and reactive to light.   Cardiovascular:      Rate and Rhythm: Normal rate and regular rhythm.      Pulses: Normal pulses.      Heart sounds: No murmur heard.  Pulmonary:      Effort: Pulmonary effort is normal.      Breath sounds: Rhonchi present.   Abdominal:      General: There is no distension.      Palpations: Abdomen is soft. There is no mass.      Tenderness: There is no abdominal tenderness.   Musculoskeletal:         General: Normal range of motion.      Cervical back: Normal range of motion.   Skin:     General: Skin is warm.   Neurological:      General: No focal deficit present.      Mental Status: She is alert.   Psychiatric:         Mood and Affect: Mood normal.           RECENT LABS:  Lab Results (last 24 hours)       Procedure Component Value Units Date/Time    Respiratory Panel PCR w/COVID-19(SARS-CoV-2) EN/BASSEM/DANNY/PAD/COR/ADAMA In-House, NP Swab in UTM/VTM, 2 HR TAT - Wash, Bronchus [[id number]] Collected: 04/05/24 0803    Specimen: Wash from Bronchus Updated: 04/05/24 0924    Respiratory Culture - Wash, Bronchus [[id number]] Collected: 04/05/24 0803    Specimen: Wash from Bronchus Updated: 04/05/24 0924    Fungus Culture - Wash, Bronchus [[id number]] Collected: 04/05/24 0803    Specimen: Wash from Bronchus Updated: 04/05/24 0924    AFB Culture - Wash, Bronchus [[id number]] Collected: 04/05/24 0803    Specimen: Wash from Bronchus Updated: 04/05/24 0924    " Pneumocystis PCR - Wash, Bronchus [[id number]] Collected: 04/05/24 0803    Specimen: Wash from Bronchus Updated: 04/05/24 0924    Respiratory Culture - Sputum, Cough [[id number]] Collected: 04/04/24 1840    Specimen: Sputum from Cough Updated: 04/05/24 0658     Gram Stain Many (4+) WBCs per low power field      Rare (1+) Epithelial cells per low power field      Moderate (3+) Gram negative bacilli, tiny      Rare (1+) Mixed bacterial morphotypes seen on Gram Stain    Blood Culture - Blood, Arm, Left [[id number]]  (Normal) Collected: 04/04/24 0446    Specimen: Blood from Arm, Left Updated: 04/05/24 0530     Blood Culture No growth at 24 hours    Narrative:      Less than seven (7) mL's of blood was collected.  Insufficient quantity may yield false negative results.    Blood Culture - Blood, Arm, Right [[id number]]  (Normal) Collected: 04/04/24 0501    Specimen: Blood from Arm, Right Updated: 04/05/24 0530     Blood Culture No growth at 24 hours    Narrative:      Less than seven (7) mL's of blood was collected.  Insufficient quantity may yield false negative results.    Basic Metabolic Panel [[id number]]  (Normal) Collected: 04/05/24 0415    Specimen: Blood Updated: 04/05/24 0522     Glucose 94 mg/dL      BUN 11 mg/dL      Creatinine 0.89 mg/dL      Sodium 140 mmol/L      Potassium 4.2 mmol/L      Comment: Slight hemolysis detected by analyzer. Result may be falsely elevated.        Chloride 104 mmol/L      CO2 23.0 mmol/L      Calcium 9.2 mg/dL      BUN/Creatinine Ratio 12.4     Anion Gap 13.0 mmol/L      eGFR 73.4 mL/min/1.73     Narrative:      GFR Normal >60  Chronic Kidney Disease <60  Kidney Failure <15      Protime-INR [[id number]]  (Normal) Collected: 04/05/24 0415    Specimen: Blood Updated: 04/05/24 0521     Protime 10.8 Seconds      INR 0.99    CBC & Differential [[id number]]  (Abnormal) Collected: 04/05/24 0415    Specimen: Blood Updated: 04/05/24 0459    Narrative:      The following orders were created for  panel order CBC & Differential.  Procedure                               Abnormality         Status                     ---------                               -----------         ------                     CBC Auto Differential[[id number]]        Abnormal            Final result                 Please view results for these tests on the individual orders.    CBC Auto Differential [[id number]]  (Abnormal) Collected: 04/05/24 0415    Specimen: Blood Updated: 04/05/24 0459     WBC 9.75 10*3/mm3      RBC 4.40 10*6/mm3      Hemoglobin 12.2 g/dL      Hematocrit 39.4 %      MCV 89.5 fL      MCH 27.7 pg      MCHC 31.0 g/dL      RDW 14.3 %      RDW-SD 46.8 fl      MPV 9.4 fL      Platelets 180 10*3/mm3      Neutrophil % 76.2 %      Lymphocyte % 15.6 %      Monocyte % 6.2 %      Eosinophil % 1.4 %      Basophil % 0.3 %      Immature Grans % 0.3 %      Neutrophils, Absolute 7.43 10*3/mm3      Lymphocytes, Absolute 1.52 10*3/mm3      Monocytes, Absolute 0.60 10*3/mm3      Eosinophils, Absolute 0.14 10*3/mm3      Basophils, Absolute 0.03 10*3/mm3      Immature Grans, Absolute 0.03 10*3/mm3      nRBC 0.0 /100 WBC             PENDING RESULTS: FNA biopsy pathology, urine cultures    IMAGING REVIEWED:  CT Angiogram Chest Pulmonary Embolism    Result Date: 4/4/2024  Impression: 1. No evidence of pulmonary embolism. 2. No acute cardiopulmonary process. There is a mass present within the right lower lobe likely representing malignancy. There is enlarged right hilar adenopathy likely representing local metastatic disease. No previous imaging available for comparison. 3. Ancillary findings as described above. Electronically Signed: Martina Benites MD  4/4/2024 12:15 AM EDT  Workstation ID: [id number]    XR Chest 1 View    Result Date: 4/3/2024  Impression: No previous imaging available for comparison. Masslike opacity present within the right lower lobe. Malignancy cannot be excluded. Electronically Signed: Martina Benites MD  4/3/2024 11:23 PM  EDT  Workstation ID: [id number]     Assessment & Plan     Tracy Reddy is a 63 y.o. female with history of pulmonary embolism and bilateral invasive ductal carcinoma status post bilateral mastectomy, chemotherapy and radiation treatments, on Arimidex.  She presented with dyspnea and was found to have a lung mass in the right lower lobe with right hilar and suprahilar lymphadenopathy.     Right lung mass  -CT imaging showed mass present within the medial right lower lobe measuring up to 3.2 x 4.1 cm that extends to the right hilar region.  Additional conglomerate of nodes present within right hilar to right suprahilar region measuring up to 2.7 x 1.8 cm.  -Pulmonology following.  Status post bronchoscopy EBUS 4/5/2024.  Will follow-up results.  -Fever and chills include recurrent metastatic breast cancer versus new lung cancer, which is less likely given that the patient is a non-smoker.  CHEK2 gene mutation does not increase the risk of lung cancer.  -If this returns as breast primary, will need to check ER/IN status.      Bilateral invasive ductal carcinoma  -Diagnosed in 2020. She is status post bilateral mastectomy with reconstruction in 2021.  She is also completed neoadjuvant chemotherapy with 6 cycles of TCHP.  Taxotere and carboplatin with dose reductions at cycle 2 secondary to chemotherapy-induced nausea and vomiting.  There is some confusion about her pathology as some reports show HER2 negative, some show positive.  However she did receive adjuvant Herceptin Perjeta maintenance which likely points towards HER2 positive disease.  She is also known to have CHEK2 mutation.  Completed adjuvant Herceptin Perjeta for 1 year.  Status post radiation therapy left chest wall and regional nodes, 5 fractions for a total dose of 5000 cGray. Current treatment with hormone blockade with Arimidex. She follows at [facility]    Pneumonia  -Respiratory pane positive for human metapneumovirus.  Preliminary sputum cultures with growth, too young to evaluate.   -Pulmonology following.    Further recommendations per Dr. Horn        Electronically signed by Lindy Aguero PA-C    Patient seen and examined agree with assessment plan    Patient is a 63-year-old female with history of HER2 positive breast cancer status post definitive treatment with surgery, adjuvant treatment.  She also has CHEK2 gene mutation.  She now presents with infection likely related to metapneumovirus and CT imaging with lung mass now status post bronchoscopy with prelim suggesting malignancy final path is pending.  Patient continues to be on Arimidex for HER2 positive breast cancer.  We will continue that we will obtain PET/CT and follow-up outpatient with results differentials include recurrent metastatic breast cancer versus primary lung cancer which is less likely given patient is a non-smoker.    I will follow-up in clinic with biopsy results and PET/CT.  Oncology will sign off follow-up outpatient    I have obtained complete history and perform physical exam along with review of labs, imaging.  I have completed the majority and substantive portion of medical decision making    Pippa Horn MD

## 2024-04-05 NOTE — NURSING NOTE
Pt returned from [facility] via stretcher per nurse. Pt assisted to bed. Pt denies any pain or other discomforts. Pt has mild, occasional cough with no production of sputum. Pt requested lights to be dimmed for her to nap.  is expected to arrive shortly.

## 2024-04-05 NOTE — OP NOTE
Bronchoscopy Procedure Note    Timeout was done appropriately by staff    Procedure:  EBUS bronchoscopy,   Therapeutic bronchoscopy with clearance of significant mucopurulent secretion  FNA right lower lobe hilar mass utilizing 22-gauge needle x 5  FNA subcarinal node utilizing 22-gauge needle x 1  Bilateral lung washing    Preoperative Diagnosis: Right lower lobe hilar mass    Postoperative Diagnosis:     Right lower lobe hilar mass positive for malignancy    Pneumonia with copious amount mucopurulent secretions    Anesthesia: General Anesthesia    Procedure Details: Patient was consented for the procedure with all risks and benefits of the procedure explained in detail.  Patient was given the opportunity to ask questions and all concerns were answered.  The bronchocope was inserted into the main airway via the endotracheal tube. An anatomical survey was done of the main airways and the subsegmental bronchus to at least the first subsegmental level of all five lobes of both lungs.  The findings are reported below.  A bronchoalveolar lavage was performed using aliquots of normal saline instilled into the airways then aspirated back.    Findings:   All airways were visualized to at least the first subsegment level of all 5 lobes of both lungs.  Airways were of normal size and caliber.    No endobronchial lesions seen.    Significant amount of mucopurulent secretions were suctioned therapeutically          We switched to EBUS bronchoscopy    We located the right lower lobe hilar mass under direct ultrasound guidance.  5 passes were done utilizing size 22 gauge needle      EBUS guided FNA of right lower lobe hilar mass confirmed malignancy, pathology onsite will further specify if it is breast origin    Then we located the subcarinal node station 7 and utilizing 22-gauge needle we performed 1 FNA                 Patient tolerated procedure well        Estimated Blood Loss:  Minimal           Specimens:  Sent purulent  fluid                Complications:  None; patient tolerated the procedure well.           Disposition: PACU - hemodynamically stable.      Patient tolerated the procedure well.    Edy Irving MD  4/5/2024  08:40 EDT

## 2024-04-05 NOTE — PROGRESS NOTES
Daily Progress Note        Abnormal CT of the chest    History of breast cancer    Dyspnea    Fever    Lung mass    Assessment:     Right hilar mass suspicious for malignancy    History of bilateral ductal carcinoma status post bilateral mastectomy 2020 , status post reconstruction 2021    History of pulmonary embolism        Recommendations:        EBUS bronchoscopy completed 4/5/2024    Significant amount of mucopurulent secretions were suctioned therapeutically  We will start empiric p.o. azithromycin and follow bronchial cultures        EBUS guided FNA of right lower lobe hilar mass confirmed malignancy, pathology onsite will further specify if it is breast origin                LOS: 0 days     Subjective         Objective     Vital signs for last 24 hours:  Vitals:    04/04/24 2125 04/04/24 2356 04/05/24 0416 04/05/24 0719   BP: 132/69 121/61 122/74 112/73   BP Location: Right leg Right leg Right leg Left arm   Patient Position: Lying Lying Lying Sitting   Pulse: 107 98 97 105   Resp: 22 26 21 28   Temp: 99.4 °F (37.4 °C) 98.6 °F (37 °C) 98.7 °F (37.1 °C) 98.5 °F (36.9 °C)   TempSrc: Oral Oral Oral Oral   SpO2: 96% 95% 91% 95%   Weight:       Height:           Intake/Output last 3 shifts:  I/O last 3 completed shifts:  In: 1500 [P.O.:500; IV Piggyback:1000]  Out: 200 [Urine:200]  Intake/Output this shift:  No intake/output data recorded.      Radiology  Imaging Results (Last 24 Hours)       ** No results found for the last 24 hours. **            Labs:  Results from last 7 days   Lab Units 04/05/24 0415   WBC 10*3/mm3 9.75   HEMOGLOBIN g/dL 12.2   HEMATOCRIT % 39.4   PLATELETS 10*3/mm3 180     Results from last 7 days   Lab Units 04/05/24  0415 04/04/24  0606 04/03/24  2251   SODIUM mmol/L 140   < > 134*   POTASSIUM mmol/L 4.2   < > 3.9   CHLORIDE mmol/L 104   < > 102   CO2 mmol/L 23.0   < > 22.1   BUN mg/dL 11   < > 19   CREATININE mg/dL 0.89   < > 1.05*   CALCIUM mg/dL 9.2   < > 9.5   BILIRUBIN mg/dL  --    --  0.6   ALK PHOS U/L  --   --  80   ALT (SGPT) U/L  --   --  15   AST (SGOT) U/L  --   --  22   GLUCOSE mg/dL 94   < > 131*    < > = values in this interval not displayed.         Results from last 7 days   Lab Units 04/03/24  2251   ALBUMIN g/dL 4.2     Results from last 7 days   Lab Units 04/04/24  0044 04/03/24  2251   HSTROP T ng/L 13 13             Results from last 7 days   Lab Units 04/05/24  0415   INR  0.99               Meds:   SCHEDULE  sodium chloride, 1,000 mL, Intravenous, Once      Infusions  sodium chloride, 50 mL/hr, Last Rate: 50 mL/hr (04/05/24 0727)      PRNs    acetaminophen    ipratropium-albuterol    oxyCODONE    Physical Exam:  Physical Exam  Cardiovascular:      Heart sounds: No murmur heard.     No gallop.   Pulmonary:      Effort: No respiratory distress.      Breath sounds: No stridor. No wheezing, rhonchi or rales.   Chest:      Chest wall: No tenderness.         ROS  Review of Systems   Respiratory:  Positive for cough. Negative for shortness of breath, wheezing and stridor.    Cardiovascular:  Negative for chest pain, palpitations and leg swelling.             Total time spent with patient greater than: 45 Minutes

## 2024-04-06 ENCOUNTER — READMISSION MANAGEMENT (OUTPATIENT)
Dept: CALL CENTER | Facility: HOSPITAL | Age: 64
End: 2024-04-06
Payer: COMMERCIAL

## 2024-04-06 VITALS
DIASTOLIC BLOOD PRESSURE: 64 MMHG | WEIGHT: 193.4 LBS | SYSTOLIC BLOOD PRESSURE: 121 MMHG | OXYGEN SATURATION: 93 % | HEIGHT: 64 IN | RESPIRATION RATE: 25 BRPM | BODY MASS INDEX: 33.02 KG/M2 | TEMPERATURE: 98.7 F | HEART RATE: 105 BPM

## 2024-04-06 LAB — NIGHT BLUE STAIN TISS: NORMAL

## 2024-04-06 PROCEDURE — 94618 PULMONARY STRESS TESTING: CPT

## 2024-04-06 RX ORDER — AZITHROMYCIN 250 MG/1
TABLET, FILM COATED ORAL
Qty: 6 TABLET | Refills: 0 | Status: SHIPPED | OUTPATIENT
Start: 2024-04-07

## 2024-04-06 RX ADMIN — AZITHROMYCIN DIHYDRATE 500 MG: 250 TABLET ORAL at 09:14

## 2024-04-06 NOTE — PROCEDURES
Exercise Oximetry    Patient Name:Tracy Reddy   MRN: [id number]   Date: 04/06/24             ROOM AIR BASELINE   SpO2% 92% room air    Heart Rate    Blood Pressure      EXERCISE ON ROOM AIR SpO2% EXERCISE ON O2 @  LPM SpO2%   1 MINUTE 93 1 MINUTE    2 MINUTES 94 2 MINUTES    3 MINUTES 93 3 MINUTES    4 MINUTES 95 4 MINUTES    5 MINUTES 94 5 MINUTES    6 MINUTES 94 6 MINUTES               Distance Walked   Distance Walked   Dyspnea (Gris Scale)   Dyspnea (Gris Scale)   Fatigue (Gris Scale)   Fatigue (Gris Scale)   SpO2% Post Exercise  94% room air  SpO2% Post Exercise   HR Post Exercise   HR Post Exercise   Time to Recovery   Time to Recovery     Comments:

## 2024-04-06 NOTE — PLAN OF CARE
Goal Outcome Evaluation:  Plan of Care Reviewed With: patient        Progress: no change       Pt currently resting abed. Pt did c/o headache this shift see mar. VSS no distress noted. Cont plan of care.

## 2024-04-06 NOTE — DISCHARGE SUMMARY
Discharge Summary    Date of Service:   Patient Name: Tracy Reddy  : 1960  MRN: [id number]    Date of Admission: 4/3/2024  Discharge Diagnosis newly diagnosed lung cancer on right lower lobe  Pneumonia with mucous purulent secretion status post EBUS bronchoscopy on   Bilateral ductal carcinoma of the breast status post bilateral mastectomy followed by chemo and radiation  Date of Discharge:    Primary Care Physician: Provider, No Known      Presenting Problem:   Lung mass [R91.8]  Hypoxia [R09.02]  Abnormal CT of the chest [R93.89]  Upper respiratory infection, acute [J06.9]    Active and Resolved Hospital Problems:  Active Hospital Problems    Diagnosis POA    **Abnormal CT of the chest [R93.89] Yes    Upper respiratory infection, acute [J06.9] Yes    History of breast cancer [Z85.3] Not Applicable    Dyspnea [R06.00] Yes    Fever [R50.9] Yes    Lung mass [R91.8] Unknown      Resolved Hospital Problems   No resolved problems to display.         Hospital Course     Hospital Course:  Tracy Reddy is a 63 y.o. female who presented to [facility] on April 3 with complaint of cough transferred from [facility] associated with fever and shortness of breath  She had a history of PE in the past  CAT scan on the lung was negative for PE but there was a mass in the right lower lobe representing malignancy with enlarged right hilar adenopathy  Patient was seen by hematology and pulmonary  She is status post bronchoscopy  Patient had a history of bilateral ductal carcinoma diagnosed in  was right breast IDC ER positive OK positive and H ER negative she is status post bilateral mastectomy with reconstruction in  and neoadjuvant chemotherapy with 6 cycles of DA CHP and radiation therapy of the chest wall  Current treatment with hormonal blockade with Arimidex  Patient had bronchoscopy on  with EBUS guided and a of the right lower lobe mass confirmed malignancy  final pathology pending  Respiratory panel was positive for human Metapneumo virus        DISCHARGE Follow Up Recommendations for labs and diagnostics: oncology in 1-2 w      Reasons For Change In Medications and Indications for New Medications:      Day of Discharge     Vital Signs:  Temp:  [98.2 °F (36.8 °C)-99.8 °F (37.7 °C)] 98.7 °F (37.1 °C)  Heart Rate:  [105-114] 105  Resp:  [22-25] 25  BP: (111-129)/(59-70) 121/64  Flow (L/min):  [2] 2    Physical Exam:  Physical Exam awake alert oriented x 3  HEENT exam is normal  Neck supple  Chest is adulteration  Heart S1 is 2 no murmur  Abdomen soft benign nontender bowel sounds positive  Extremities no edema        Pertinent  and/or Most Recent Results     LAB RESULTS:      Lab 04/05/24 0415 04/04/24  0506 04/03/24  2251   WBC 9.75  --  9.57   HEMOGLOBIN 12.2  --  13.0   HEMATOCRIT 39.4  --  41.3   PLATELETS 180  --  192   NEUTROS ABS 7.43*  --  8.08*   IMMATURE GRANS (ABS) 0.03  --  0.02   LYMPHS ABS 1.52  --  0.83   MONOS ABS 0.60  --  0.58   EOS ABS 0.14  --  0.04   MCV 89.5  --  89.0   LACTATE  --  1.3  --    PROTIME 10.8  --   --          Lab 04/05/24  0415 04/04/24  0606 04/03/24  2251   SODIUM 140 138 134*   POTASSIUM 4.2 4.0 3.9   CHLORIDE 104 103 102   CO2 23.0 24.0 22.1   ANION GAP 13.0 11.0 9.9   BUN 11 16 19   CREATININE 0.89 1.01* 1.05*   EGFR 73.4 63.1 60.2   GLUCOSE 94 125* 131*   CALCIUM 9.2 8.9 9.5         Lab 04/03/24  2251   TOTAL PROTEIN 7.0   ALBUMIN 4.2   GLOBULIN 2.8   ALT (SGPT) 15   AST (SGOT) 22   BILIRUBIN 0.6   ALK PHOS 80         Lab 04/05/24  0415 04/04/24  0044 04/03/24  2251   PROBNP  --   --  146.8   HSTROP T  --  13 13   PROTIME 10.8  --   --    INR 0.99  --   --                  Brief Urine Lab Results       None          Microbiology Results (last 10 days)       Procedure Component Value - Date/Time    AFB Culture - Wash, Bronchus [[id number]] Collected: 04/05/24 0803    Lab Status: Preliminary result Specimen: Wash from Bronchus  Updated: 04/06/24 1006     AFB Stain No acid fast bacilli seen on concentrated smear    Respiratory Culture - Wash, Bronchus [[id number]] Collected: 04/05/24 0803    Lab Status: Preliminary result Specimen: Wash from Bronchus Updated: 04/05/24 1015     Gram Stain Many (4+) WBCs seen      No organisms seen    Respiratory Panel PCR w/COVID-19(SARS-CoV-2) EN/BASSEM/DANNY/PAD/COR/ADAMA In-House, NP Swab in UTM/VTM, 2 HR TAT - Wash, Bronchus [[id number]]  (Abnormal) Collected: 04/05/24 0803    Lab Status: Final result Specimen: Wash from Bronchus Updated: 04/05/24 1027     ADENOVIRUS, PCR Not Detected     Coronavirus 229E Not Detected     Coronavirus HKU1 Not Detected     Coronavirus NL63 Not Detected     Coronavirus OC43 Not Detected     COVID19 Not Detected     Human Metapneumovirus Detected     Human Rhinovirus/Enterovirus Not Detected     Influenza A PCR Not Detected     Influenza B PCR Not Detected     Parainfluenza Virus 1 Not Detected     Parainfluenza Virus 2 Not Detected     Parainfluenza Virus 3 Not Detected     Parainfluenza Virus 4 Not Detected     RSV, PCR Not Detected     Bordetella pertussis pcr Not Detected     Bordetella parapertussis PCR Not Detected     Chlamydophila pneumoniae PCR Not Detected     Mycoplasma pneumo by PCR Not Detected    Narrative:      In the setting of a positive respiratory panel with a viral infection PLUS a negative procalcitonin without other underlying concern for bacterial infection, consider observing off antibiotics or discontinuation of antibiotics and continue supportive care. If the respiratory panel is positive for atypical bacterial infection (Bordetella pertussis, Chlamydophila pneumoniae, or Mycoplasma pneumoniae), consider antibiotic de-escalation to target atypical bacterial infection.    Respiratory Culture - Sputum, Cough [[id number]] Collected: 04/04/24 1840    Lab Status: Preliminary result Specimen: Sputum from Cough Updated: 04/05/24 0942     Respiratory Culture Growth  present, too young to evaluate     Gram Stain Many (4+) WBCs per low power field      Rare (1+) Epithelial cells per low power field      Moderate (3+) Gram negative bacilli, tiny      Rare (1+) Mixed bacterial morphotypes seen on Gram Stain    Blood Culture - Blood, Arm, Right [[id number]]  (Normal) Collected: 04/04/24 0501    Lab Status: Preliminary result Specimen: Blood from Arm, Right Updated: 04/06/24 0530     Blood Culture No growth at 2 days    Narrative:      Less than seven (7) mL's of blood was collected.  Insufficient quantity may yield false negative results.    Blood Culture - Blood, Arm, Left [[id number]]  (Normal) Collected: 04/04/24 0446    Lab Status: Preliminary result Specimen: Blood from Arm, Left Updated: 04/06/24 0530     Blood Culture No growth at 2 days    Narrative:      Less than seven (7) mL's of blood was collected.  Insufficient quantity may yield false negative results.    RSV PCR - Swab, Nasopharynx [[id number]]  (Normal) Collected: 04/03/24 2311    Lab Status: Final result Specimen: Swab from Nasopharynx Updated: 04/03/24 2341     RSV, PCR Not Detected    COVID-19 and FLU A/B PCR, 1 HR TAT - Swab, Nasopharynx [[id number]]  (Normal) Collected: 04/03/24 2237    Lab Status: Final result Specimen: Swab from Nasopharynx Updated: 04/03/24 2301     COVID19 Not Detected     Influenza A PCR Not Detected     Influenza B PCR Not Detected    Narrative:      Fact sheet for providers: https://www.fda.gov/media/503153/download    Fact sheet for patients: https://www.fda.gov/media/451637/download    Test performed by PCR.            CT Angiogram Chest Pulmonary Embolism    Result Date: 4/4/2024  Impression: Impression: 1. No evidence of pulmonary embolism. 2. No acute cardiopulmonary process. There is a mass present within the right lower lobe likely representing malignancy. There is enlarged right hilar adenopathy likely representing local metastatic disease. No previous imaging available for  comparison. 3. Ancillary findings as described above. Electronically Signed: Martina Benites MD  4/4/2024 12:15 AM EDT  Workstation ID: [id number]    XR Chest 1 View    Result Date: 4/3/2024  Impression: Impression: No previous imaging available for comparison. Masslike opacity present within the right lower lobe. Malignancy cannot be excluded. Electronically Signed: Martina Benites MD  4/3/2024 11:23 PM EDT  Workstation ID: [id number]                 Labs Pending at Discharge:  Pending Labs       Order Current Status    Fungus Culture - Wash, Bronchus In process    Non-gynecologic Cytology In process    Pneumocystis PCR - Wash, Bronchus In process    AFB Culture - Wash, Bronchus Preliminary result    Blood Culture - Blood, Arm, Left Preliminary result    Blood Culture - Blood, Arm, Right Preliminary result    Fine Needle Aspiration Preliminary result    Respiratory Culture - Sputum, Cough Preliminary result    Respiratory Culture - Wash, Bronchus Preliminary result            Procedures Performed  Procedure(s):  BRONCHOSCOPY WITH ENDOBRONCHIAL ULTRASOUND, bilateral bronchial washings, fine needle aspiration x 2 areas  04/06 1030 Walking Oximetry  04/05 0658 Bronchoscopy      Consults:   Consults       Date and Time Order Name Status Description    4/4/2024  4:10 AM Inpatient Pulmonology Consult Completed     4/4/2024  4:06 AM Hematology & Oncology Inpatient Consult Completed               Discharge Details        Discharge Medications        ASK your doctor about these medications        Instructions Start Date   anastrozole 1 MG tablet  Commonly known as: ARIMIDEX   1 mg, Oral, Daily               Allergies   Allergen Reactions    Penicillins Rash         Discharge Disposition: home      Diet:  Hospital:  Diet Order   Procedures    Diet: Regular/House; Fluid Consistency: Thin (IDDSI 0)         Discharge Activity:         CODE STATUS:  Code Status and Medical Interventions:   Ordered at: 04/04/24 0458     Code Status  (Patient has no pulse and is not breathing):    CPR (Attempt to Resuscitate)     Medical Interventions (Patient has pulse or is breathing):    Full Support         No future appointments.        Time spent on Discharge including face to face service:  35 minutes        Signature: Electronically signed by Xochitl Nicholas MD, 04/06/24, 11:14 EDT.  [facility]

## 2024-04-06 NOTE — OUTREACH NOTE
Prep Survey      Flowsheet Row Responses   Denominational facility patient discharged from? [facility]   Is LACE score < 7 ? No   Eligibility Readm Mgmt   Discharge diagnosis Abnormal CT-Bronchoscpy with U/S   Does the patient have one of the following disease processes/diagnoses(primary or secondary)? Other   Does the patient have Home health ordered? No   Is there a DME ordered? No   Prep survey completed? Yes            FINESSE ENGLAND - Registered Nurse

## 2024-04-06 NOTE — PLAN OF CARE
Goal Outcome Evaluation:              Outcome Evaluation: Patient discharging home. IV removed. No O2 required.

## 2024-04-07 LAB
BACTERIA SPEC RESP CULT: ABNORMAL
GRAM STN SPEC: ABNORMAL

## 2024-04-08 DIAGNOSIS — R93.89 ABNORMAL CT OF THE CHEST: ICD-10-CM

## 2024-04-08 DIAGNOSIS — R59.0 HILAR LYMPHADENOPATHY: Primary | ICD-10-CM

## 2024-04-08 DIAGNOSIS — R91.8 RIGHT LOWER LOBE LUNG MASS: ICD-10-CM

## 2024-04-08 DIAGNOSIS — R91.8 LUNG MASS: ICD-10-CM

## 2024-04-08 NOTE — PAYOR COMM NOTE
"This is discharge notification for Tracy Hoyos   Reference/Auth # [id number]   Pt discharged on 4/6/24    Pending inpatient authorization request    Gwen Pompa RN, BSN  Utilization Review Nurse  [facility]  Direct & confidential phone # [phone]  Fax # [phone]      Tracy Hoyos (63 y.o. Female)       Date of Birth   1960    Social Security Number       Address   [address] [place] [address]    Home Phone       MRN   [id number]       Synagogue   None    Marital Status                               Admission Date   4/3/24    Admission Type   Emergency    Admitting Provider   Petr De La Fuente MD    Attending Provider       Department, Room/Bed   [facility] 3A MEDICAL INPATIENT, 302/1       Discharge Date   4/6/2024    Discharge Disposition   Home or Self Care    Discharge Destination                                 Attending Provider: (none)   Allergies: Penicillins    Isolation: None   Infection: Human Metapneumovirus  (04/05/24)   Code Status: Prior    Ht: 162.6 cm (64\")   Wt: 87.7 kg (193 lb 6.4 oz)    Admission Cmt: None   Principal Problem: Abnormal CT of the chest [R93.89]                   Active Insurance as of 4/3/2024       Primary Coverage       Payor Plan Insurance Group Employer/Plan Group    [place] [id number]       Payor Plan Address Payor Plan Phone Number Payor Plan Fax Number Effective Dates    [address] [phone]  9/1/2021 - None Entered    [facility] [id number]         Subscriber Name Subscriber Birth Date Member ID       TRACY HOYOS 1960 [id number]                     Emergency Contacts        (Rel.) Home Phone Work Phone Mobile Phone    ROSELIA HOYOS (Spouse) -- -- [phone]              Vital Signs (last day) before discharge       Date/Time Temp Temp src Pulse Resp BP Patient Position SpO2    04/06/24 0821 98.7 (37.1) Oral 105 25 121/64 Lying 93    04/05/24 2325 99.1 (37.3) " Oral 108 22 120/68 Lying 94    04/05/24 1628 98.2 (36.8) Oral 107 24 111/59 Lying 93    04/05/24 1329 99.8 (37.7) Oral 114 22 129/70 Lying --    04/05/24 0910 -- -- 110 25 129/55 Lying 94    04/05/24 0900 -- -- 112 -- 127/59 -- 95    04/05/24 0850 -- -- 111 27 109/52 Lying 93    04/05/24 0845 -- -- 116 24 130/99 Lying 92    04/05/24 0840 -- -- 118 26 128/97 Lying 96    04/05/24 0719 98.5 (36.9) Oral 105 28 112/73 Sitting 95    04/05/24 0416 98.7 (37.1) Oral 97 21 122/74 Lying 91          Oxygen Therapy (last day) before discharge       Date/Time SpO2 Device (Oxygen Therapy) Flow (L/min) Oxygen Concentration (%) ETCO2 (mmHg)    04/06/24 0821 93 nasal cannula 2 -- --    04/06/24 0814 -- nasal cannula 2 -- --    04/05/24 2325 94 -- -- -- --    04/05/24 2000 -- nasal cannula 2 -- --    04/05/24 1628 93 nasal cannula 2 -- --    04/05/24 1329 -- nasal cannula 2 -- --    04/05/24 1100 -- nasal cannula 2 -- --    04/05/24 0910 94 room air -- -- --    04/05/24 0900 95 -- -- -- --    04/05/24 0850 93 nasal cannula 3 -- 1    04/05/24 0845 92 nasal cannula 3 -- 0    04/05/24 0840 96 simple face mask 6 -- 0    04/05/24 0719 95 nasal prongs 2 -- --    04/05/24 0700 -- nasal cannula 3 -- --    04/05/24 0416 91 nasal cannula 3 -- --    04/05/24 0334 -- nasal cannula 3 -- --    04/05/24 0005 -- nasal cannula 3 -- --          Lab Results (last 24 hours)       Procedure Component Value Units Date/Time    AFB Culture - Wash, Bronchus [[id number]] Collected: 04/05/24 0803    Specimen: Wash from Bronchus Updated: 04/06/24 1006     AFB Stain No acid fast bacilli seen on concentrated smear          Imaging Results (Last 24 Hours)       ** No results found for the last 24 hours. **          ECG/EMG Results (last 24 hours)       ** No results found for the last 24 hours. **               Discharge Summary        Xochitl Nicholas MD at 04/06/24 1114                    Discharge Summary    Date of Service: 4/6  Patient Name: Tracy  Barry  : 1960  MRN: [id number]    Date of Admission: 4/3/2024  Discharge Diagnosis newly diagnosed lung cancer on right lower lobe  Pneumonia with mucous purulent secretion status post EBUS bronchoscopy on   Bilateral ductal carcinoma of the breast status post bilateral mastectomy followed by chemo and radiation  Date of Discharge:    Primary Care Physician: Provider, No Known      Presenting Problem:   Lung mass [R91.8]  Hypoxia [R09.02]  Abnormal CT of the chest [R93.89]  Upper respiratory infection, acute [J06.9]    Active and Resolved Hospital Problems:  Active Hospital Problems    Diagnosis POA    **Abnormal CT of the chest [R93.89] Yes    Upper respiratory infection, acute [J06.9] Yes    History of breast cancer [Z85.3] Not Applicable    Dyspnea [R06.00] Yes    Fever [R50.9] Yes    Lung mass [R91.8] Unknown      Resolved Hospital Problems   No resolved problems to display.         Hospital Course     Hospital Course:  Tracy Reddy is a 63 y.o. female who presented to [facility] on April 3 with complaint of cough transferred from [facility] associated with fever and shortness of breath  She had a history of PE in the past  CAT scan on the lung was negative for PE but there was a mass in the right lower lobe representing malignancy with enlarged right hilar adenopathy  Patient was seen by hematology and pulmonary  She is status post bronchoscopy  Patient had a history of bilateral ductal carcinoma diagnosed in  was right breast IDC ER positive IA positive and H ER negative she is status post bilateral mastectomy with reconstruction in  and neoadjuvant chemotherapy with 6 cycles of DA CHP and radiation therapy of the chest wall  Current treatment with hormonal blockade with Arimidex  Patient had bronchoscopy on  with EBUS guided and a of the right lower lobe mass confirmed malignancy final pathology pending  Respiratory panel was positive for human Metapneumo  virus        DISCHARGE Follow Up Recommendations for labs and diagnostics: oncology in 1-2 w      Reasons For Change In Medications and Indications for New Medications:      Day of Discharge     Vital Signs:  Temp:  [98.2 °F (36.8 °C)-99.8 °F (37.7 °C)] 98.7 °F (37.1 °C)  Heart Rate:  [105-114] 105  Resp:  [22-25] 25  BP: (111-129)/(59-70) 121/64  Flow (L/min):  [2] 2    Physical Exam:  Physical Exam awake alert oriented x 3  HEENT exam is normal  Neck supple  Chest is adulteration  Heart S1 is 2 no murmur  Abdomen soft benign nontender bowel sounds positive  Extremities no edema        Pertinent  and/or Most Recent Results     LAB RESULTS:      Lab 04/05/24 0415 04/04/24  0506 04/03/24  2251   WBC 9.75  --  9.57   HEMOGLOBIN 12.2  --  13.0   HEMATOCRIT 39.4  --  41.3   PLATELETS 180  --  192   NEUTROS ABS 7.43*  --  8.08*   IMMATURE GRANS (ABS) 0.03  --  0.02   LYMPHS ABS 1.52  --  0.83   MONOS ABS 0.60  --  0.58   EOS ABS 0.14  --  0.04   MCV 89.5  --  89.0   LACTATE  --  1.3  --    PROTIME 10.8  --   --          Lab 04/05/24 0415 04/04/24  0606 04/03/24  2251   SODIUM 140 138 134*   POTASSIUM 4.2 4.0 3.9   CHLORIDE 104 103 102   CO2 23.0 24.0 22.1   ANION GAP 13.0 11.0 9.9   BUN 11 16 19   CREATININE 0.89 1.01* 1.05*   EGFR 73.4 63.1 60.2   GLUCOSE 94 125* 131*   CALCIUM 9.2 8.9 9.5         Lab 04/03/24  2251   TOTAL PROTEIN 7.0   ALBUMIN 4.2   GLOBULIN 2.8   ALT (SGPT) 15   AST (SGOT) 22   BILIRUBIN 0.6   ALK PHOS 80         Lab 04/05/24 0415 04/04/24  0044 04/03/24  2251   PROBNP  --   --  146.8   HSTROP T  --  13 13   PROTIME 10.8  --   --    INR 0.99  --   --                  Brief Urine Lab Results       None          Microbiology Results (last 10 days)       Procedure Component Value - Date/Time    AFB Culture - Wash, Bronchus [[id number]] Collected: 04/05/24 0803    Lab Status: Preliminary result Specimen: Wash from Bronchus Updated: 04/06/24 1006     AFB Stain No acid fast bacilli seen on concentrated  smear    Respiratory Culture - Wash, Bronchus [[id number]] Collected: 04/05/24 0803    Lab Status: Preliminary result Specimen: Wash from Bronchus Updated: 04/05/24 1015     Gram Stain Many (4+) WBCs seen      No organisms seen    Respiratory Panel PCR w/COVID-19(SARS-CoV-2) EN/BASSEM/DANNY/PAD/COR/ADAMA In-House, NP Swab in UTM/VTM, 2 HR TAT - Wash, Bronchus [[id number]]  (Abnormal) Collected: 04/05/24 0803    Lab Status: Final result Specimen: Wash from Bronchus Updated: 04/05/24 1027     ADENOVIRUS, PCR Not Detected     Coronavirus 229E Not Detected     Coronavirus HKU1 Not Detected     Coronavirus NL63 Not Detected     Coronavirus OC43 Not Detected     COVID19 Not Detected     Human Metapneumovirus Detected     Human Rhinovirus/Enterovirus Not Detected     Influenza A PCR Not Detected     Influenza B PCR Not Detected     Parainfluenza Virus 1 Not Detected     Parainfluenza Virus 2 Not Detected     Parainfluenza Virus 3 Not Detected     Parainfluenza Virus 4 Not Detected     RSV, PCR Not Detected     Bordetella pertussis pcr Not Detected     Bordetella parapertussis PCR Not Detected     Chlamydophila pneumoniae PCR Not Detected     Mycoplasma pneumo by PCR Not Detected    Narrative:      In the setting of a positive respiratory panel with a viral infection PLUS a negative procalcitonin without other underlying concern for bacterial infection, consider observing off antibiotics or discontinuation of antibiotics and continue supportive care. If the respiratory panel is positive for atypical bacterial infection (Bordetella pertussis, Chlamydophila pneumoniae, or Mycoplasma pneumoniae), consider antibiotic de-escalation to target atypical bacterial infection.    Respiratory Culture - Sputum, Cough [[id number]] Collected: 04/04/24 1840    Lab Status: Preliminary result Specimen: Sputum from Cough Updated: 04/05/24 0942     Respiratory Culture Growth present, too young to evaluate     Gram Stain Many (4+) WBCs per low power  field      Rare (1+) Epithelial cells per low power field      Moderate (3+) Gram negative bacilli, tiny      Rare (1+) Mixed bacterial morphotypes seen on Gram Stain    Blood Culture - Blood, Arm, Right [[id number]]  (Normal) Collected: 04/04/24 0501    Lab Status: Preliminary result Specimen: Blood from Arm, Right Updated: 04/06/24 0530     Blood Culture No growth at 2 days    Narrative:      Less than seven (7) mL's of blood was collected.  Insufficient quantity may yield false negative results.    Blood Culture - Blood, Arm, Left [[id number]]  (Normal) Collected: 04/04/24 0446    Lab Status: Preliminary result Specimen: Blood from Arm, Left Updated: 04/06/24 0530     Blood Culture No growth at 2 days    Narrative:      Less than seven (7) mL's of blood was collected.  Insufficient quantity may yield false negative results.    RSV PCR - Swab, Nasopharynx [[id number]]  (Normal) Collected: 04/03/24 2311    Lab Status: Final result Specimen: Swab from Nasopharynx Updated: 04/03/24 2341     RSV, PCR Not Detected    COVID-19 and FLU A/B PCR, 1 HR TAT - Swab, Nasopharynx [[id number]]  (Normal) Collected: 04/03/24 2237    Lab Status: Final result Specimen: Swab from Nasopharynx Updated: 04/03/24 2301     COVID19 Not Detected     Influenza A PCR Not Detected     Influenza B PCR Not Detected    Narrative:      Fact sheet for providers: https://www.fda.gov/media/877784/download    Fact sheet for patients: https://www.fda.gov/media/101153/download    Test performed by PCR.            CT Angiogram Chest Pulmonary Embolism    Result Date: 4/4/2024  Impression: Impression: 1. No evidence of pulmonary embolism. 2. No acute cardiopulmonary process. There is a mass present within the right lower lobe likely representing malignancy. There is enlarged right hilar adenopathy likely representing local metastatic disease. No previous imaging available for comparison. 3. Ancillary findings as described above. Electronically Signed: Martina  MD Delmis  4/4/2024 12:15 AM EDT  Workstation ID: [id number]    XR Chest 1 View    Result Date: 4/3/2024  Impression: Impression: No previous imaging available for comparison. Masslike opacity present within the right lower lobe. Malignancy cannot be excluded. Electronically Signed: Martina Benites MD  4/3/2024 11:23 PM EDT  Workstation ID: [id number]                 Labs Pending at Discharge:  Pending Labs       Order Current Status    Fungus Culture - Wash, Bronchus In process    Non-gynecologic Cytology In process    Pneumocystis PCR - Wash, Bronchus In process    AFB Culture - Wash, Bronchus Preliminary result    Blood Culture - Blood, Arm, Left Preliminary result    Blood Culture - Blood, Arm, Right Preliminary result    Fine Needle Aspiration Preliminary result    Respiratory Culture - Sputum, Cough Preliminary result    Respiratory Culture - Wash, Bronchus Preliminary result            Procedures Performed  Procedure(s):  BRONCHOSCOPY WITH ENDOBRONCHIAL ULTRASOUND, bilateral bronchial washings, fine needle aspiration x 2 areas  04/06 1030 Walking Oximetry  04/05 0658 Bronchoscopy      Consults:   Consults       Date and Time Order Name Status Description    4/4/2024  4:10 AM Inpatient Pulmonology Consult Completed     4/4/2024  4:06 AM Hematology & Oncology Inpatient Consult Completed               Discharge Details        Discharge Medications        ASK your doctor about these medications        Instructions Start Date   anastrozole 1 MG tablet  Commonly known as: ARIMIDEX   1 mg, Oral, Daily               Allergies   Allergen Reactions    Penicillins Rash         Discharge Disposition: home      Diet:  Hospital:  Diet Order   Procedures    Diet: Regular/House; Fluid Consistency: Thin (IDDSI 0)         Discharge Activity:         CODE STATUS:  Code Status and Medical Interventions:   Ordered at: 04/04/24 5702     Code Status (Patient has no pulse and is not breathing):    CPR (Attempt to Resuscitate)     Medical  Interventions (Patient has pulse or is breathing):    Full Support         No future appointments.        Time spent on Discharge including face to face service:  35 minutes        Signature: Electronically signed by Xochitl Nicholas MD, 04/06/24, 11:14 EDT.  [facility]      Electronically signed by Xochitl Nicholas MD at 04/06/24 5531

## 2024-04-09 LAB
BACTERIA SPEC AEROBE CULT: NORMAL
BACTERIA SPEC AEROBE CULT: NORMAL

## 2024-04-10 ENCOUNTER — READMISSION MANAGEMENT (OUTPATIENT)
Dept: CALL CENTER | Facility: HOSPITAL | Age: 64
End: 2024-04-10
Payer: COMMERCIAL

## 2024-04-10 LAB
P JIROVECII DNA L RESP QL NAA+NON-PROBE: NEGATIVE
REF LAB TEST METHOD: NORMAL

## 2024-04-10 NOTE — OUTREACH NOTE
Medical Week 1 Survey      Flowsheet Row Responses   [facility] facility patient discharged from? [facility]   Does the patient have one of the following disease processes/diagnoses(primary or secondary)? Other   Week 1 attempt successful? No   Unsuccessful attempts Attempt 1            Erika TOVAR - Registered Nurse

## 2024-04-12 ENCOUNTER — HOSPITAL ENCOUNTER (OUTPATIENT)
Dept: PET IMAGING | Facility: HOSPITAL | Age: 64
Discharge: HOME | End: 2024-04-12
Payer: COMMERCIAL

## 2024-04-12 DIAGNOSIS — R91.8 LUNG MASS: ICD-10-CM

## 2024-04-12 DIAGNOSIS — R93.89 ABNORMAL CT OF THE CHEST: ICD-10-CM

## 2024-04-12 DIAGNOSIS — R59.0 HILAR LYMPHADENOPATHY: ICD-10-CM

## 2024-04-12 DIAGNOSIS — R91.8 RIGHT LOWER LOBE LUNG MASS: ICD-10-CM

## 2024-04-12 LAB
FUNGUS WND CULT: NORMAL
GLUCOSE BLDC GLUCOMTR-MCNC: 102 MG/DL (ref 70–105)
MYCOBACTERIUM SPEC CULT: NORMAL
NIGHT BLUE STAIN TISS: NORMAL

## 2024-04-12 PROCEDURE — 82948 REAGENT STRIP/BLOOD GLUCOSE: CPT

## 2024-04-12 PROCEDURE — A9552 F18 FDG: HCPCS | Performed by: INTERNAL MEDICINE

## 2024-04-12 PROCEDURE — 0 FLUDEOXYGLUCOSE F18 SOLUTION: Performed by: INTERNAL MEDICINE

## 2024-04-12 PROCEDURE — 78815 PET IMAGE W/CT SKULL-THIGH: CPT

## 2024-04-12 RX ADMIN — FLUDEOXYGLUCOSE F 18 1 DOSE: 200 INJECTION, SOLUTION INTRAVENOUS at 09:18

## 2024-04-12 RX ADMIN — FLUDEOXYGLUCOSE F 18 1 DOSE: 200 INJECTION, SOLUTION INTRAVENOUS at 09:08

## 2024-04-12 NOTE — PROGRESS NOTES
HEMATOLOGY ONCOLOGY OUTPATIENT FOLLOW UP       Patient name: Tracy Reddy  : 1960  MRN: [id number]  Primary Care Physician: Provider, No Known  Referring Physician: No ref. provider found  Reason For Consult: breast cancer        History of Present Illness:    Tracy Reddy is a 63 y.o. female who presented to [facility] on 4/3/2024 with complaints of cough.  She initially presented to [facility] ED with complaints of fever up to 103 °F, productive cough with green and brown sputum, shortness of air and chest tightness that has been ongoing for several days.  She reports she has family members that have been sick at home.  She reports she has also had a history of PE in the past.  Labs show troponin was 13 x 2, .8.  CBC unremarkable.  Respiratory panel was negative. Chest x-ray showed masslike opacity within the right lower lobe.  Sputum cultures and blood cultures have been ordered.  She is a non-smoker.  Pulmonology also has been consulted.     24 CT chest angiogram  Impression:  1. No evidence of pulmonary embolism.  2. No acute cardiopulmonary process. There is a mass present within the right lower lobe likely representing malignancy. There is enlarged right hilar adenopathy likely representing local metastatic disease. No previous imaging available for comparison.        24  Hematology/Oncology was consulted.       From record review: Patient has a history of bilateral ductal carcinoma diagnosed in .  Right breast IDC, G1, ER positive, IN positive, HER-2 negative. Left breast IDC, G2, ER positive, IN positive, HER-2 negative. Left SLND revealed 1 of 2 lymph nodes positive for macro metastasis. She is status post bilateral mastectomy with reconstruction in .  She is also completed neoadjuvant chemotherapy with 6 cycles of TCHP.  Taxotere and carboplatin with dose reductions at cycle 2 secondary to chemotherapy-induced nausea and vomiting.  Completed  adjuvant Herceptin Perjeta for 1 year.  Status post radiation therapy left chest wall and regional nodes, 5 fractions for a total dose of 5000 cGray. Current treatment with hormone blockade with Arimidex.  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  She follows at [facility], reviewed most recent office note from 3/11/2024.     4/5/2024 status post bronchoscopy.  EBUS guided FNA of the right lower lobe hilar mass confirmed metastatic ductal carcinoma of the breast ER +100% MO +10%, HER2 nu pending  Respiratory panel positive for human metapneumovirus.    4/12/2024 PET/CT with 1.1 cm lesion in the region of the left vallecula concerning for second primary malignancy.  Recommend correlation with direct visualization, large right hilar mass consistent with malignancy, no evidence of metastatic disease elsewhere     Subjective:      Past Medical History:   Diagnosis Date    Cancer        Past Surgical History:   Procedure Laterality Date    BRONCHOSCOPY N/A 4/5/2024    Procedure: BRONCHOSCOPY WITH ENDOBRONCHIAL ULTRASOUND, bilateral bronchial washings, fine needle aspiration x 2 areas;  Surgeon: Edy Irving MD;  Location: [facility] ENDOSCOPY;  Service: Pulmonary;  Laterality: N/A;  post: lung mass    MASTECTOMY           Current Outpatient Medications:     anastrozole (ARIMIDEX) 1 MG tablet, Take 1 tablet by mouth Daily., Disp: , Rfl:     Calcium Citrate-Vitamin D (Citrus Calcium/Vitamin D) 200-6.25 MG-MCG tablet, Take  by mouth., Disp: , Rfl:     Allergies   Allergen Reactions    Penicillins Rash       No family history on file.    Cancer-related family history is not on file.    Social History     Tobacco Use    Smoking status: Never     Passive exposure: Never    Smokeless tobacco: Never   Vaping Use    Vaping status: Never Used   Substance Use Topics    Alcohol use: Never    Drug use: Never     Social History     Social History Narrative    Not on file      ROS:   Review of Systems  "  Constitutional:  Negative for fatigue and fever.   HENT:  Negative for congestion and nosebleeds.    Eyes:  Negative for pain.   Respiratory:  Negative for cough and shortness of breath.    Cardiovascular:  Negative for chest pain.   Gastrointestinal:  Negative for abdominal pain, blood in stool, diarrhea, nausea and vomiting.   Endocrine: Negative for cold intolerance and heat intolerance.   Genitourinary:  Negative for difficulty urinating.   Musculoskeletal:  Negative for arthralgias.   Skin:  Negative for rash.   Neurological:  Negative for dizziness and headaches.   Hematological:  Does not bruise/bleed easily.   Psychiatric/Behavioral:  Negative for behavioral problems.        Objective:  Vital signs:  Vitals:    04/18/24 0938   BP: 125/78   Pulse: 100   Resp: 14   Temp: 97.6 °F (36.4 °C)   SpO2: 96%   Weight: 83.7 kg (184 lb 9.6 oz)   Height: 162.6 cm (64\")   PainSc: 0-No pain     Body mass index is 31.69 kg/m².  ECOG  (0) Fully active, able to carry on all predisease performance without restriction    Physical Exam:   Physical Exam  Constitutional:       Appearance: Normal appearance.   HENT:      Head: Normocephalic and atraumatic.   Eyes:      Pupils: Pupils are equal, round, and reactive to light.   Cardiovascular:      Rate and Rhythm: Normal rate and regular rhythm.      Pulses: Normal pulses.      Heart sounds: No murmur heard.  Pulmonary:      Effort: Pulmonary effort is normal.      Breath sounds: Normal breath sounds.   Abdominal:      General: There is no distension.      Palpations: Abdomen is soft. There is no mass.      Tenderness: There is no abdominal tenderness.   Musculoskeletal:         General: Normal range of motion.      Cervical back: Normal range of motion and neck supple.   Skin:     General: Skin is warm.   Neurological:      General: No focal deficit present.      Mental Status: She is alert.   Psychiatric:         Mood and Affect: Mood normal.         Lab Results - Last 18 Months " "  Lab Units 04/18/24  0905 04/05/24 0415 04/03/24  2251   WBC 10*3/mm3 6.87 9.75 9.57   HEMOGLOBIN g/dL 13.2 12.2 13.0   HEMATOCRIT % 41.6 39.4 41.3   PLATELETS 10*3/mm3 244 180 192   MCV fL 91.4 89.5 89.0     Lab Results - Last 18 Months   Lab Units 04/05/24 0415 04/04/24  0606 04/03/24  2251   SODIUM mmol/L 140 138 134*   POTASSIUM mmol/L 4.2 4.0 3.9   CHLORIDE mmol/L 104 103 102   CO2 mmol/L 23.0 24.0 22.1   BUN mg/dL 11 16 19   CREATININE mg/dL 0.89 1.01* 1.05*   CALCIUM mg/dL 9.2 8.9 9.5   BILIRUBIN mg/dL  --   --  0.6   ALK PHOS U/L  --   --  80   ALT (SGPT) U/L  --   --  15   AST (SGOT) U/L  --   --  22   GLUCOSE mg/dL 94 125* 131*       Lab Results   Component Value Date    GLUCOSE 94 04/05/2024    BUN 11 04/05/2024    CREATININE 0.89 04/05/2024    EGFRIFNONA 57 (L) 11/10/2021    EGFRIFAFRI 66 11/10/2021    BCR 12.4 04/05/2024    K 4.2 04/05/2024    CO2 23.0 04/05/2024    CALCIUM 9.2 04/05/2024    ALBUMIN 4.2 04/03/2024    AST 22 04/03/2024    ALT 15 04/03/2024       Lab Results - Last 18 Months   Lab Units 04/05/24 0415   INR  0.99       No results found for: \"IRON\", \"TIBC\", \"FERRITIN\"    No results found for: \"FOLATE\"    No results found for: \"OCCULTBLD\"    No results found for: \"RETICCTPCT\"  No results found for: \"OQAJTWAP92\"  No results found for: \"SPEP\", \"UPEP\"  No results found for: \"LDH\", \"URICACID\"  No results found for: \"BABAK\", \"RF\", \"SEDRATE\"  No results found for: \"FIBRINOGEN\", \"HAPTOGLOBIN\"  Lab Results   Component Value Date    INR 0.99 04/05/2024     No results found for: \"\"  No results found for: \"CEA\"  No components found for: \"CA-19-9\"  No results found for: \"PSA\"  No results found for: \"SEDRATE\"    Assessment & Plan     Tracy Reddy is a 63 y.o. female with history of pulmonary embolism and bilateral invasive ductal carcinoma status post bilateral mastectomy, chemotherapy and radiation treatments, on Arimidex.  She presented with dyspnea and was found to have a lung mass in the " right lower lobe with right hilar and suprahilar lymphadenopathy.     Right lung mass-metastatic ductal carcinoma of the breast -recurrent  -CT imaging showed mass present within the medial right lower lobe measuring up to 3.2 x 4.1 cm that extends to the right hilar region.  Additional conglomerate of nodes present within right hilar to right suprahilar region measuring up to 2.7 x 1.8 cm.  Status post bronchoscopy EBUS 4/5/2024.    Biopsy confirms metastatic ductal carcinoma of the breast ER/VT positive, HER2 nu pending.  Further treatment will depend on HER2/nancy status.  Will also send tumor sample out for [employer] NGS testing.  She is on Arimidex right now but will need to switch to an alternate AI or a different agent given mutational analysis findings.  If HER2 positive disease, will use HER2 blockade.  Given there is no other clear areas of metastasis.  We can still consider curative treatment with either radio-ablation or surgical resection.  Will refer the patient to see thoracic surgery and radiation oncology for those options  I will discuss her case at tumor board conference once the above workup is back.      History of bilateral invasive ductal carcinoma  -Diagnosed in 2020. She is status post bilateral mastectomy with reconstruction in 2021.  She is also completed neoadjuvant chemotherapy with 6 cycles of TCHP.  Taxotere and carboplatin with dose reductions at cycle 2 secondary to chemotherapy-induced nausea and vomiting.  There is some confusion about her pathology as some reports show HER2 negative, some show positive.  However she did receive adjuvant Herceptin Perjeta maintenance which likely points towards HER2 positive disease.  She is also known to have CHEK2 mutation.  Completed adjuvant Herceptin Perjeta for 1 year.  Status post radiation therapy left chest wall and regional nodes, 5 fractions for a total dose of 5000 cGray. Current treatment with hormone blockade with Arimidex. She follows at  [facility] but will transfer care to us now     Pneumonia  -Respiratory pane positive for human metapneumovirus.  Resolved symptoms now    Osteopenia  Patient has been on Zometa,  I will follow-up the results on her last DEXA scan continue calcium vitamin D.     Time spent on encounter including record review, history taking, exam, discussion, counseling and documentation at: 40 minutes

## 2024-04-15 LAB
LAB AP CASE REPORT: NORMAL
PATH REPORT.FINAL DX SPEC: NORMAL
PATH REPORT.GROSS SPEC: NORMAL

## 2024-04-16 DIAGNOSIS — J38.7 VALLECULAR MASS: ICD-10-CM

## 2024-04-16 DIAGNOSIS — R94.8 ABNORMAL POSITRON EMISSION TOMOGRAPHY (PET) SCAN: ICD-10-CM

## 2024-04-16 DIAGNOSIS — R91.8 LUNG MASS: Primary | ICD-10-CM

## 2024-04-17 ENCOUNTER — READMISSION MANAGEMENT (OUTPATIENT)
Dept: CALL CENTER | Facility: HOSPITAL | Age: 64
End: 2024-04-17
Payer: COMMERCIAL

## 2024-04-17 LAB
BEAKER LAB AP INTRAOPERATIVE CONSULTATION: NORMAL
LAB AP CASE REPORT: NORMAL
LAB AP DIAGNOSIS COMMENT: NORMAL
PATH REPORT.ADDENDUM SPEC: NORMAL
PATH REPORT.FINAL DX SPEC: NORMAL
PATH REPORT.GROSS SPEC: NORMAL

## 2024-04-17 NOTE — OUTREACH NOTE
Medical Week 2 Survey      Flowsheet Row Responses   [facility] facility patient discharged from? [facility]   Does the patient have one of the following disease processes/diagnoses(primary or secondary)? Other   Week 2 attempt successful? No   Unsuccessful attempts Attempt 1            Inocencia GARCÍA - Licensed Nurse

## 2024-04-18 ENCOUNTER — HOSPITAL ENCOUNTER (OUTPATIENT)
Dept: ONCOLOGY | Facility: HOSPITAL | Age: 64
Discharge: HOME | End: 2024-04-18
Payer: COMMERCIAL

## 2024-04-18 ENCOUNTER — PATIENT OUTREACH (OUTPATIENT)
Dept: ONCOLOGY | Facility: CLINIC | Age: 64
End: 2024-04-18
Payer: COMMERCIAL

## 2024-04-18 ENCOUNTER — OFFICE VISIT (OUTPATIENT)
Dept: ONCOLOGY | Facility: CLINIC | Age: 64
End: 2024-04-18
Payer: COMMERCIAL

## 2024-04-18 ENCOUNTER — LAB (OUTPATIENT)
Dept: LAB | Facility: HOSPITAL | Age: 64
End: 2024-04-18
Payer: COMMERCIAL

## 2024-04-18 VITALS
SYSTOLIC BLOOD PRESSURE: 125 MMHG | HEART RATE: 100 BPM | DIASTOLIC BLOOD PRESSURE: 78 MMHG | TEMPERATURE: 97.6 F | HEIGHT: 64 IN | WEIGHT: 184.6 LBS | BODY MASS INDEX: 31.51 KG/M2 | OXYGEN SATURATION: 96 % | RESPIRATION RATE: 14 BRPM

## 2024-04-18 DIAGNOSIS — R91.8 LUNG MASS: Primary | ICD-10-CM

## 2024-04-18 DIAGNOSIS — R91.8 LUNG MASS: ICD-10-CM

## 2024-04-18 PROBLEM — C50.911 BILATERAL MALIGNANT NEOPLASM OF BREAST IN FEMALE: Chronic | Status: ACTIVE | Noted: 2020-07-07

## 2024-04-18 PROBLEM — Z15.01 MONOALLELIC MUTATION OF CHEK2 GENE IN FEMALE PATIENT: Status: ACTIVE | Noted: 2020-07-14

## 2024-04-18 PROBLEM — Z15.89 MONOALLELIC MUTATION OF CHEK2 GENE IN FEMALE PATIENT: Status: ACTIVE | Noted: 2020-07-14

## 2024-04-18 PROBLEM — M85.9 LOW BONE DENSITY IN PREMENOPAUSAL PATIENT: Status: ACTIVE | Noted: 2021-03-26

## 2024-04-18 PROBLEM — N95.8 LOW BONE DENSITY IN PREMENOPAUSAL PATIENT: Status: ACTIVE | Noted: 2021-03-26

## 2024-04-18 PROBLEM — Z78.0 MENOPAUSE: Status: ACTIVE | Noted: 2021-03-26

## 2024-04-18 PROBLEM — C50.911 INVASIVE DUCTAL CARCINOMA OF BREAST, FEMALE, RIGHT: Chronic | Status: ACTIVE | Noted: 2020-06-22

## 2024-04-18 PROBLEM — C50.919 BREAST CANCER METASTASIZED TO LUNG: Status: ACTIVE | Noted: 2024-04-18

## 2024-04-18 PROBLEM — M81.0 ENCOUNTER FOR ONGOING OSTEOPOROSIS BISPHOSPHONATE THERAPY: Status: ACTIVE | Noted: 2022-03-02

## 2024-04-18 PROBLEM — Z15.09 MONOALLELIC MUTATION OF CHEK2 GENE IN FEMALE PATIENT: Status: ACTIVE | Noted: 2020-07-14

## 2024-04-18 PROBLEM — Z79.83 ENCOUNTER FOR ONGOING OSTEOPOROSIS BISPHOSPHONATE THERAPY: Status: ACTIVE | Noted: 2022-03-02

## 2024-04-18 PROBLEM — Z15.02 MONOALLELIC MUTATION OF CHEK2 GENE IN FEMALE PATIENT: Status: ACTIVE | Noted: 2020-07-14

## 2024-04-18 PROBLEM — C78.00 BREAST CANCER METASTASIZED TO LUNG: Status: ACTIVE | Noted: 2024-04-18

## 2024-04-18 PROBLEM — C50.912 BILATERAL MALIGNANT NEOPLASM OF BREAST IN FEMALE: Chronic | Status: ACTIVE | Noted: 2020-07-07

## 2024-04-18 PROBLEM — Z79.811 AROMATASE INHIBITOR USE: Status: ACTIVE | Noted: 2021-03-26

## 2024-04-18 PROBLEM — C50.912 INVASIVE DUCTAL CARCINOMA OF BREAST, FEMALE, LEFT: Chronic | Status: ACTIVE | Noted: 2021-05-05

## 2024-04-18 PROBLEM — R19.7 DIARRHEA WITH DEHYDRATION: Status: RESOLVED | Noted: 2020-08-19 | Resolved: 2024-04-18

## 2024-04-18 LAB
BASOPHILS # BLD AUTO: 0.03 10*3/MM3 (ref 0–0.2)
BASOPHILS NFR BLD AUTO: 0.4 % (ref 0–1.5)
DEPRECATED RDW RBC AUTO: 47.7 FL (ref 37–54)
EOSINOPHIL # BLD AUTO: 0.27 10*3/MM3 (ref 0–0.4)
EOSINOPHIL NFR BLD AUTO: 3.9 % (ref 0.3–6.2)
ERYTHROCYTE [DISTWIDTH] IN BLOOD BY AUTOMATED COUNT: 14.6 % (ref 12.3–15.4)
HCT VFR BLD AUTO: 41.6 % (ref 34–46.6)
HGB BLD-MCNC: 13.2 G/DL (ref 12–15.9)
HOLD SPECIMEN: NORMAL
HOLD SPECIMEN: NORMAL
LYMPHOCYTES # BLD AUTO: 1.77 10*3/MM3 (ref 0.7–3.1)
LYMPHOCYTES NFR BLD AUTO: 25.8 % (ref 19.6–45.3)
MCH RBC QN AUTO: 29 PG (ref 26.6–33)
MCHC RBC AUTO-ENTMCNC: 31.7 G/DL (ref 31.5–35.7)
MCV RBC AUTO: 91.4 FL (ref 79–97)
MONOCYTES # BLD AUTO: 0.54 10*3/MM3 (ref 0.1–0.9)
MONOCYTES NFR BLD AUTO: 7.9 % (ref 5–12)
NEUTROPHILS NFR BLD AUTO: 4.26 10*3/MM3 (ref 1.7–7)
NEUTROPHILS NFR BLD AUTO: 62 % (ref 42.7–76)
PLATELET # BLD AUTO: 244 10*3/MM3 (ref 140–450)
PMV BLD AUTO: 9.1 FL (ref 6–12)
RBC # BLD AUTO: 4.55 10*6/MM3 (ref 3.77–5.28)
WBC NRBC COR # BLD AUTO: 6.87 10*3/MM3 (ref 3.4–10.8)

## 2024-04-18 PROCEDURE — 36415 COLL VENOUS BLD VENIPUNCTURE: CPT

## 2024-04-18 PROCEDURE — 85025 COMPLETE CBC W/AUTO DIFF WBC: CPT | Performed by: INTERNAL MEDICINE

## 2024-04-18 RX ORDER — SODIUM CHLORIDE 0.9 % (FLUSH) 0.9 %
10 SYRINGE (ML) INJECTION AS NEEDED
OUTPATIENT
Start: 2024-04-18

## 2024-04-18 RX ORDER — HEPARIN SODIUM (PORCINE) LOCK FLUSH IV SOLN 100 UNIT/ML 100 UNIT/ML
500 SOLUTION INTRAVENOUS AS NEEDED
OUTPATIENT
Start: 2024-04-18

## 2024-04-18 NOTE — PROGRESS NOTES
Patient here for M.D. visit and after M.D. visit completed. Patient  had an order for [employer] testing which I drew after patient signed forms required.Patient gave AVS per M.D. staff.

## 2024-04-18 NOTE — PROGRESS NOTES
[facility] RADIATION ONCOLOGY  CONSULTATION NOTE    NAME: Tracy Reddy  YOB: 1960  MRN #: [id number]  DATE OF SERVICE: 4/19/2024  REFERRING PROVIDER: Pippa Horn MD   PRIMARY CARE PROVIDER: Provider, No Known    CHIEF COMPLAINT:  Breast cancer mets to lung    DIAGNOSIS:    Encounter Diagnoses   Name Primary?    Carcinoma of breast metastatic to lung, unspecified laterality Yes    Bilateral malignant neoplasm of breast in female, estrogen receptor positive, unspecified site of breast       Cancer Staging     Bilateral malignant neoplasm of breast in female (HCC)    Staging form: Breast, AJCC 8th Edition    - Clinical stage from 7/12/2020: Stage IB (cT2, cN0, cM0, G2, ER+, MO+, HER2+) -   Signed by Basim Carvajal MD on 7/12/2020     Pacemaker?:  no   Prior XRT?:  yes   04/12/2021 - 05/18/2021: 5000 cGy in 25 fractions with DIBH to left chest wall + regional LNs ([facility])  Contraindications?:  no  Pregnancy Test?:  No, patient is female and >55 years and/or has undergone hysterectomy     HISTORY OF PRESENT ILLNESS     Tracy Reddy is a 63 y.o. 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  She underwent neoadjuvant chemotherapy with 6 cycles TCHP, followed by bilateral mastectomy with implant reconstruction, adjuvant PHESGO and Arimidex, and adjuvant left chest wall and regional lymph node radiation. 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 All prior oncology care was provided by [facility] in [place], [place].    More recently, she presented to [facility] on 4/3/2024 with complaints of a productive cough with green and brown sputum, fever up to 100 °F, SOB and chest tightness for the previous several days.  She has a prior history of PE.  CT imaging on 4/4/2024 showed a mass in the RLL and enlarged right hilar adenopathy.  She underwent Ion  bronchoscopy with EBUS and FNA of RLL and subcarinal mass on 4/5/2024.  Pathology of both specimens revealed metastatic ductal carcinoma.  PET/CT imaging on 4/12/2024 showed the right hilar mass had an SUV of 16.7 and measured 4.7 x 4.5 cm, and a 1.1 cm lesion in the region of the left valecula that is concerning for second primary malignancy (DL recommended).     The patient met with Dr. Day who ordered HER2 testing on her specimen. He referred the patient for discussion of local therapies for her lung disease.     IMAGING     NM PET/CT Skull Base to Mid Thigh  Facility: [facility]  Result Date: 4/12/2024  Impression: 1. 1.1 cm lesion in the region of the left vallecula, concerning for second primary malignancy. Recommend correlation with direct visualization via laryngoscope. 2. Large right hilar mass consistent with malignancy. 3. No evidence of metastatic disease. Electronically Signed: Saravanan Barragan MD  4/12/2024 3:56 PM EDT  Workstation ID: [id number]    CT Angiogram Chest Pulmonary Embolism  Facility: [facility]  Result Date: 4/4/2024  Impression: 1. No evidence of pulmonary embolism. 2. No acute cardiopulmonary process. There is a mass present within the right lower lobe likely representing malignancy. There is enlarged right hilar adenopathy likely representing local metastatic disease. No previous imaging available for comparison. 3. Ancillary findings as described above. Electronically Signed: Martina Benites MD  4/4/2024 12:15 AM EDT  Workstation ID: [id number]    XR Chest 1 View  Facility: [facility]  Result Date: 4/3/2024  Impression: No previous imaging available for comparison. Masslike opacity present within the right lower lobe. Malignancy cannot be excluded. Electronically Signed: Martina Benites MD  4/3/2024 11:23 PM EDT  Workstation ID: [id number]       PATHOLOGY       4/5/2024  Addendum   An additional pathology request for HER2 was received on 04/17/2024 from Dr. Pippa Horn.  The case report and block 2A were retrieved from archives and forwarded to [facility] for testing.    Addendum electronically signed by Amari Celestin MD on 4/17/2024 at 1419   Final Diagnosis   Specimen #1 and #2 (Lung, right lower lobe mass, Ion guided fine-needle aspiration, smears and cell block preparation):    Metastatic ductal carcinoma, see comment     Specimen #3 and #4 (Lung, subcarinal mass, Ion guided fine-needle aspiration, smears and cell block preparation):    Metastatic ductal carcinoma, see comment #2       Tissue Pathology Exam (Bilateral Mastectomy)  Date: 1/15/2021  Facility: [place]  Final Diagnosis   A. Left breast, mastectomy:  - Invasive ductal carcinoma, grade 2, status post neoadjuvant chemotherapy, 27 mm: ypT2; see synoptic/comment.  - Stellate tumor bed spans 27 x 20 x 16 mm with treatment effect.  - Tumor cellularity 60%.  - Biopsy site change.  - Margins negative (closest 1 mm to anterior; remainder widely clear).  - Benign skin and nipple     B. Right breast, mastectomy:  - Invasive ductal carcinoma, grade 1, status post neoadjuvant chemotherapy, 2 mm: ypT1a; see synoptic/comment.  - Ductal carcinoma in situ, low nuclear grade, cribriform pattern.  - Margins negative for invasive carcinoma and DCIS (closest 6 mm to anterior).  - Benign skin, nipple, and skeletal muscle.     C. Right breast skin, excision:  - Benign skin. Negative for carcinoma.     D. Left breast skin, excision:  - Benign skin. Negative for carcinoma.   Electronically signed by Rochelle Patiño MD on 1/19/2021 at  2:54 PM   Comment    Biomarker testing will be performed on the left breast tumor with results to follow by addendum.     Addendum   Summary of Scanned Report(s)  Breast Biomarker Reporting Template for ER, PgR and HER2 by IHC   ---------------------------------------------------------------------------  TEST              RESULT      % of nuclei staining/Intensity/Rosario Score                              ER by IHC       POSITIVE             86%  /  3+ / 8                      PgR by IHC     POSITIVE             85%  /  3+ / 8         HER2 by IHC  NEGATIVE           0          0% of Tumor Cells With 3+ Staining                                -----------------------------------------------  HER2 (ERBB2) (by in situ hybridization) FISH: NOT PERFORMED      Addendum electronically signed by oRchelle Patiño MD on 1/22/2021 at  4:02 PM   Synoptic Checklist    INVASIVE CARCINOMA OF THE BREAST: Resection  (INVASIVE CARCINOMA OF THE BREAST: COMPLETE EXCISION - A)  8th Edition - Protocol posted: 2/26/2020    SPECIMEN     Procedure: Total mastectomy     Specimen Laterality: Left  TUMOR     Tumor Site:    Clock position     :    12 o'clock   Histologic Type:    Invasive carcinoma of no special type (ductal)   Glandular (Acinar) / Tubular Differentiation:    Score 3   Nuclear Pleomorphism:    Score 3   Mitotic Rate:    Score 1   Overall Grade:    Grade 2 (scores of 6 or 7)   Tumor Size:    Greatest dimension of largest invasive focus (Millimeters): 27 mm     Additional Dimension (Millimeters):    20 mm     Additional Dimension (Millimeters):    16 mm   Tumor Focality:    Single focus of invasive carcinoma   Ductal Carcinoma In Situ (DCIS):    Not identified   Lobular Carcinoma In Situ (LCIS):    Not identified   Tumor Extent:       Lymphovascular Invasion:    Not identified   Dermal Lymphovascular Invasion:    Not identified   Treatment Effect in the Breast:    Probable or definite response to presurgical therapy in the invasive carcinoma  MARGINS   Invasive Carcinoma Margins:    Uninvolved by invasive carcinoma     Distance from Closest Margin (Millimeters):    1 mm     Closest Margin(s):    Anterior  LYMPH NODES   Regional Lymph Nodes:    No lymph nodes submitted or found  PATHOLOGIC STAGE CLASSIFICATION (pTNM, AJCC 8th Edition)   TNM Descriptors:    y (post-treatment)   Primary Tumor (pT):    pT2   Regional Lymph  Nodes (pN):    pNX   Breast Biomarker Testing Performed on Previous Biopsy:         Estrogen Receptor (ER) Status:    Positive (greater than 10% of cells demonstrate nuclear positivity)   Breast Biomarker Testing Performed on Previous Biopsy:         Progesterone Receptor (PgR) Status:    Positive   Breast Biomarker Testing Performed on Previous Biopsy:         HER2 (by immunohistochemistry):    Equivocal (Score 2+)   Breast Biomarker Testing Performed on Previous Biopsy:         HER2 (by in situ hybridization):    Positive (amplified)     Testing Performed on Case Number:    [id number]    SPECIMEN     Procedure: Total mastectomy     Specimen Laterality: Right  TUMOR     Tumor Site:    Clock position     :    8 o'clock   Histologic Type:    Invasive carcinoma of no special type (ductal)   Glandular (Acinar) / Tubular Differentiation:    Score 2   Nuclear Pleomorphism:    Score 1   Mitotic Rate:    Score 1   Overall Grade:    Grade 1 (scores of 3, 4 or 5)   Tumor Size:    Greatest dimension of largest invasive focus (Millimeters): 2 mm   Tumor Focality:    Single focus of invasive carcinoma   Ductal Carcinoma In Situ (DCIS):    Present     :    Negative for extensive intraductal component (EIC)     Size (Extent) of DCIS:    Estimated size (extent) of DCIS is at least (Millimeters): 6 mm     Architectural Patterns:    Cribriform     Nuclear Grade:    Grade I (low)     Necrosis:    Not identified   Lobular Carcinoma In Situ (LCIS):    Not identified   Tumor Extent:       Lymphovascular Invasion:    Not identified   Dermal Lymphovascular Invasion:    Not identified   Treatment Effect in the Breast:    No definite response to presurgical therapy in the invasive carcinoma  MARGINS   Invasive Carcinoma Margins:    Uninvolved by invasive carcinoma     Distance from Closest Margin (Millimeters):    6 mm     Closest Margin(s):    Anterior   DCIS Margins:    Uninvolved by DCIS     Distance from Closest Margin (Millimeters):     6 mm     Closest Margin(s):    Anterior  LYMPH NODES   Regional Lymph Nodes:  No lymph nodes submitted or found  PATHOLOGIC STAGE CLASSIFICATION (pTNM, AJCC 8th Edition)     TNM Descriptors:    y (post-treatment)   Primary Tumor (pT):    pT1a   Regional Lymph Nodes (pN):    pNX   Breast Biomarker Testing Performed on Previous Biopsy:         Estrogen Receptor (ER) Status:    Positive (greater than 10% of cells demonstrate nuclear positivity)   Breast Biomarker Testing Performed on Previous Biopsy:         Progesterone Receptor (PgR) Status:    Positive   Breast Biomarker Testing Performed on Previous Biopsy:         HER2 (by immunohistochemistry):    Negative (Score 1+)     Testing Performed on Case Number:    [id number]       LABS     HEMATOLOGY:  WBC   Date Value Ref Range Status   04/18/2024 6.87 3.40 - 10.80 10*3/mm3 Final   10/24/2023 7.0 3.7 - 10.3 x10(3)/mcL Final     RBC   Date Value Ref Range Status   04/18/2024 4.55 3.77 - 5.28 10*6/mm3 Final   10/24/2023 4.75 3.90 - 5.20 x10(6)/mcL Final     Hemoglobin   Date Value Ref Range Status   04/18/2024 13.2 12.0 - 15.9 g/dL Final   10/24/2023 13.7 11.2 - 15.7 g/dL Final     Hematocrit   Date Value Ref Range Status   04/18/2024 41.6 34.0 - 46.6 % Final   10/24/2023 41.7 34.0 - 45.0 % Final     Platelets   Date Value Ref Range Status   04/18/2024 244 140 - 450 10*3/mm3 Final   10/24/2023 199 155 - 369 x10(3)/mcL Final     CHEMISTRY:  Lab Results   Component Value Date    GLUCOSE 94 04/05/2024    BUN 11 04/05/2024    CREATININE 0.89 04/05/2024    EGFRIFNONA 57 (L) 11/10/2021    EGFRIFAFRI 66 11/10/2021    BCR 12.4 04/05/2024    K 4.2 04/05/2024    CO2 23.0 04/05/2024    CALCIUM 9.2 04/05/2024    ALBUMIN 4.2 04/03/2024    AST 22 04/03/2024    ALT 15 04/03/2024     PROBLEM LIST     Patient Active Problem List   Diagnosis    Abnormal CT of the chest    History of breast cancer    Dyspnea    Fever    Lung mass    Upper respiratory infection, acute    Aromatase inhibitor  use    Bilateral malignant neoplasm of breast in female    Encounter for ongoing osteoporosis bisphosphonate therapy    Menopause    Monoallelic mutation of CHEK2 gene in female patient    Low bone density in premenopausal patient    Invasive ductal carcinoma of breast, female, right    Invasive ductal carcinoma of breast, female, left    Breast cancer metastasized to lung      CURRENT MEDICATIONS     Current Outpatient Medications   Medication Instructions    anastrozole (ARIMIDEX) 1 mg, Oral, Daily    Calcium Citrate-Vitamin D (Citrus Calcium/Vitamin D) 200-6.25 MG-MCG tablet Oral    cetirizine (ZYRTEC) 10 mg, Oral, Daily      ALLERGIES     Allergies   Allergen Reactions    Penicillins Rash       FAMILY HISTORY   No family history on file.   SOCIAL HISTORY     Social History     Tobacco Use    Smoking status: Never     Passive exposure: Never    Smokeless tobacco: Never   Vaping Use    Vaping status: Never Used   Substance Use Topics    Alcohol use: Never    Drug use: Never      REVIEW OF SYSTEMS     Review of Systems   HENT:          Notable mass at base of tongue   Respiratory:  Positive for cough and shortness of breath.         Vitals:    04/19/24 0903   BP: 108/74   Pulse: 91   Resp: 18   SpO2: 97%      Physical Exam  Constitutional:       General: She is not in acute distress.  HENT:      Head: Normocephalic and atraumatic.   Pulmonary:      Effort: Pulmonary effort is normal. No respiratory distress.   Neurological:      Mental Status: She is alert and oriented to person, place, and time. Mental status is at baseline.   Psychiatric:         Mood and Affect: Mood normal.         Behavior: Behavior normal.          ECOG:  Restricted in physically strenuous activity but ambulatory and able to carry out work of a light or sedentary nature, e.g., light house work, office work = 1        ASSESSMENT AND PLAN     ASSESSMENT:    Tracy Reddy is a 63 y.o. female who was diagnosed with bilateral intraductal  carcinoma with left axillary disease in 6/2020.  She underwent neoadjuvant chemotherapy with 6 cycles TCHP, followed by bilateral mastectomy with implant reconstruction, adjuvant PHESGO and Arimidex, and adjuvant left chest wall and regional lymph node radiation. 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 She is now diagnosed with a lung metastasis with concern for hilar adenopathy and a suspicious mass at the base of tongue/valecula noted on PET imaging. She presents for discussion of local therapies.     Diagnoses and all orders for this visit:    1. Carcinoma of breast metastatic to lung, unspecified laterality (Primary)    2. Bilateral malignant neoplasm of breast in female, estrogen receptor positive, unspecified site of breast       PLAN:      Orders:  - MRI brain to complete staging  - PFTs to evaluate pulmonary function.     We reviewed the patient's primary diagnosis, treatment, and now evidence of metastatic disease. We discussed her imaging findings and biopsy information. We discussed need for additional work-up including HER2 testing, MRI brain, and PFT testing. The patient is scheduled to meet with ENT to evaluate the base of tongue/valecula mass seen on PET and is then scheduled to meet with Dr. Armenta in thoracic surgery to discuss local treatments of the lung metastasis. After completion of work-up, we will discuss the patient's case at our upcoming tumor board.     We reviewed the potential role of radiation therapy in the management of her disease. We discussed how radiation therapy is planned and delivered and potential acute and late side effects. The patient and her  were able to ask questions and have them answered to their apparent satisfaction. We will plan to see her back after the tumor board discussion. She is encouraged to reach out with questions or concerns prior to her next appointment.     I spent 60 minutes caring for Tracy on  this date of service. This time includes time spent by me in the following activities:preparing for the visit, reviewing tests, obtaining and/or reviewing a separately obtained history, performing a medically appropriate examination and/or evaluation , counseling and educating the patient/family/caregiver, ordering medications, tests, or procedures, documenting information in the medical record, and independently interpreting results and communicating that information with the patient/family/caregiver    FOLLOW UP     No follow-ups on file.     CC: Pippa Horn MD Provider, No Known

## 2024-04-19 ENCOUNTER — TELEPHONE (OUTPATIENT)
Dept: SURGERY | Facility: CLINIC | Age: 64
End: 2024-04-19
Payer: COMMERCIAL

## 2024-04-19 ENCOUNTER — PATIENT OUTREACH (OUTPATIENT)
Dept: ONCOLOGY | Facility: CLINIC | Age: 64
End: 2024-04-19
Payer: COMMERCIAL

## 2024-04-19 ENCOUNTER — CONSULT (OUTPATIENT)
Dept: RADIATION ONCOLOGY | Facility: HOSPITAL | Age: 64
End: 2024-04-19
Payer: COMMERCIAL

## 2024-04-19 VITALS
BODY MASS INDEX: 32.1 KG/M2 | HEART RATE: 91 BPM | DIASTOLIC BLOOD PRESSURE: 74 MMHG | RESPIRATION RATE: 18 BRPM | WEIGHT: 187 LBS | SYSTOLIC BLOOD PRESSURE: 108 MMHG | OXYGEN SATURATION: 97 %

## 2024-04-19 DIAGNOSIS — R91.8 RIGHT LOWER LOBE LUNG MASS: Primary | ICD-10-CM

## 2024-04-19 DIAGNOSIS — C78.00 CARCINOMA OF RIGHT BREAST METASTATIC TO LUNG: ICD-10-CM

## 2024-04-19 DIAGNOSIS — Z17.0 BILATERAL MALIGNANT NEOPLASM OF BREAST IN FEMALE, ESTROGEN RECEPTOR POSITIVE, UNSPECIFIED SITE OF BREAST: Chronic | ICD-10-CM

## 2024-04-19 DIAGNOSIS — C78.00 CARCINOMA OF BREAST METASTATIC TO LUNG, UNSPECIFIED LATERALITY: Primary | ICD-10-CM

## 2024-04-19 DIAGNOSIS — C50.912 BILATERAL MALIGNANT NEOPLASM OF BREAST IN FEMALE, ESTROGEN RECEPTOR POSITIVE, UNSPECIFIED SITE OF BREAST: Chronic | ICD-10-CM

## 2024-04-19 DIAGNOSIS — C50.911 CARCINOMA OF RIGHT BREAST METASTATIC TO LUNG: ICD-10-CM

## 2024-04-19 DIAGNOSIS — C50.911 BILATERAL MALIGNANT NEOPLASM OF BREAST IN FEMALE, ESTROGEN RECEPTOR POSITIVE, UNSPECIFIED SITE OF BREAST: Chronic | ICD-10-CM

## 2024-04-19 DIAGNOSIS — C50.919 CARCINOMA OF BREAST METASTATIC TO LUNG, UNSPECIFIED LATERALITY: Primary | ICD-10-CM

## 2024-04-19 LAB
FUNGUS WND CULT: NORMAL
MYCOBACTERIUM SPEC CULT: NORMAL
NIGHT BLUE STAIN TISS: NORMAL

## 2024-04-19 PROCEDURE — G0463 HOSPITAL OUTPT CLINIC VISIT: HCPCS | Performed by: INTERNAL MEDICINE

## 2024-04-19 RX ORDER — CETIRIZINE HYDROCHLORIDE 10 MG/1
10 TABLET ORAL DAILY
COMMUNITY

## 2024-04-19 NOTE — PROGRESS NOTES
I accompanied the patient and her spouse to her appointment with Dr. Wilburn today.    Dr. Wilburn discussed the patient's pathology and imaging results.  He notified the patient that he would like to complete her breast cancer staging with a Brain MRI with her having a lung mass.   He also stated that her would like to go ahead and order PFT's for Dr. Armenta.     I was able to schedule the patient for her Brain MRI for 4/24 and her PFT's 4/23.  The patient was notified of her appointments.

## 2024-04-19 NOTE — PROGRESS NOTES
I accompanied the patient and her spouse to her appointment with Dr. Horn on 4/18/2024.    The patient was seen in the ER due to having a cold which led to shortness of air.  The area in her lung was found on CT scan.    Dr. Horn discussed the findings of the patient's PET scan.  She discussed that she also had abnormal uptake in her vallecula.   He discussed the patient's pathology findings and that it was confirmed to be breast cancer.  The patient's Her2 results are pending.    Dr. Horn discussed the role for either lobectomy or radiation therapy to treat the mass in her lung.  He also wants her to be seen by ENT to evaluate the uptake in her vallecula, but feels that this could be from inflammation for being ill.      The patient will need to also be presented at Tumor board.  She also needs referrals to ENT, Thoracis surgery and Radiation Oncology.      The patient was given my contact information and I let her know that I would be contacting her with her appointments.    The patient is scheduled to see Radiation Oncology on 4/19 at 9:00am.  She is scheduled to see ENT on 4/24 at 9:30.  She is scheduled to see Dr. Armenta on 4/25 at 11:45 in his [place] office.  The patient was notified of her appointments and voiced understanding.

## 2024-04-23 ENCOUNTER — HOSPITAL ENCOUNTER (OUTPATIENT)
Dept: RESPIRATORY THERAPY | Facility: HOSPITAL | Age: 64
Discharge: HOME | End: 2024-04-23
Admitting: INTERNAL MEDICINE
Payer: COMMERCIAL

## 2024-04-23 VITALS — RESPIRATION RATE: 16 BRPM | OXYGEN SATURATION: 95 % | HEART RATE: 110 BPM

## 2024-04-23 DIAGNOSIS — R91.8 RIGHT LOWER LOBE LUNG MASS: ICD-10-CM

## 2024-04-23 DIAGNOSIS — C50.911 CARCINOMA OF RIGHT BREAST METASTATIC TO LUNG: ICD-10-CM

## 2024-04-23 DIAGNOSIS — C78.00 CARCINOMA OF RIGHT BREAST METASTATIC TO LUNG: ICD-10-CM

## 2024-04-23 PROCEDURE — 94664 DEMO&/EVAL PT USE INHALER: CPT

## 2024-04-23 PROCEDURE — 94799 UNLISTED PULMONARY SVC/PX: CPT

## 2024-04-23 PROCEDURE — 94729 DIFFUSING CAPACITY: CPT

## 2024-04-23 PROCEDURE — 94726 PLETHYSMOGRAPHY LUNG VOLUMES: CPT

## 2024-04-23 PROCEDURE — 94060 EVALUATION OF WHEEZING: CPT

## 2024-04-23 RX ORDER — ALBUTEROL SULFATE 90 UG/1
2 AEROSOL, METERED RESPIRATORY (INHALATION) ONCE
Status: COMPLETED | OUTPATIENT
Start: 2024-04-23 | End: 2024-04-23

## 2024-04-23 RX ADMIN — ALBUTEROL SULFATE 2 PUFF: 108 AEROSOL, METERED RESPIRATORY (INHALATION) at 07:10

## 2024-04-24 ENCOUNTER — HOSPITAL ENCOUNTER (OUTPATIENT)
Dept: MRI IMAGING | Facility: HOSPITAL | Age: 64
Discharge: HOME | End: 2024-04-24
Admitting: INTERNAL MEDICINE
Payer: COMMERCIAL

## 2024-04-24 ENCOUNTER — READMISSION MANAGEMENT (OUTPATIENT)
Dept: CALL CENTER | Facility: HOSPITAL | Age: 64
End: 2024-04-24
Payer: COMMERCIAL

## 2024-04-24 ENCOUNTER — TELEPHONE (OUTPATIENT)
Dept: SURGERY | Facility: CLINIC | Age: 64
End: 2024-04-24
Payer: COMMERCIAL

## 2024-04-24 DIAGNOSIS — R91.8 RIGHT LOWER LOBE LUNG MASS: ICD-10-CM

## 2024-04-24 DIAGNOSIS — C50.911 CARCINOMA OF RIGHT BREAST METASTATIC TO LUNG: ICD-10-CM

## 2024-04-24 DIAGNOSIS — C78.00 CARCINOMA OF RIGHT BREAST METASTATIC TO LUNG: ICD-10-CM

## 2024-04-24 PROCEDURE — 25010000002 GADOTERIDOL PER 1 ML: Performed by: INTERNAL MEDICINE

## 2024-04-24 PROCEDURE — A9579 GAD-BASE MR CONTRAST NOS,1ML: HCPCS | Performed by: INTERNAL MEDICINE

## 2024-04-24 PROCEDURE — 70553 MRI BRAIN STEM W/O & W/DYE: CPT

## 2024-04-24 RX ADMIN — GADOTERIDOL 17 ML: 279.3 INJECTION, SOLUTION INTRAVENOUS at 15:57

## 2024-04-24 NOTE — OUTREACH NOTE
Medical Week 3 Survey      Flowsheet Row Responses   [facility] patient discharged from? [facility]   Does the patient have one of the following disease processes/diagnoses(primary or secondary)? Other   Week 3 attempt successful? Yes   Call start time 1020   Call end time 1021   Discharge diagnosis Abnormal CT-Bronchoscpy with U/S   Meds reviewed with patient/caregiver? Yes   Is the patient having any side effects they believe may be caused by any medication additions or changes? No   Does the patient have all medications ordered at discharge? Yes   Is the patient taking all medications as directed (includes completed medication regime)? Yes   Does the patient have a primary care provider?  Yes   Does the patient have an appointment with their PCP within 7 days of discharge? Yes   Has the patient kept scheduled appointments due by today? Yes   Has home health visited the patient within 72 hours of discharge? N/A   Psychosocial issues? No   What is the patient's perception of their health status since discharge? Improving   Week 3 Call Completed? Yes   Graduated Yes   Call end time 1021            Amber TOVAR - Registered Nurse

## 2024-04-24 NOTE — TELEPHONE ENCOUNTER
Pt  confirmed appointment cancellation by provider, for pt. Pt was made aware that if pt has abnormal CT to please feel free to give us a call and we can get them scheduled. Pt was agreeable and understood     11:29  4/24/2024

## 2024-04-26 LAB
FUNGUS WND CULT: NORMAL
MYCOBACTERIUM SPEC CULT: NORMAL
NIGHT BLUE STAIN TISS: NORMAL

## 2024-04-29 LAB
REF LAB TEST METHOD: NORMAL
REF LAB TEST METHOD: NORMAL

## 2024-04-30 NOTE — PROGRESS NOTES
HEMATOLOGY ONCOLOGY OUTPATIENT FOLLOW UP       Patient name: Tracy Reddy  : 1960  MRN: [id number]  Primary Care Physician: Provider, No Known  Referring Physician: Provider, No Known  Reason For Consult: breast cancer        History of Present Illness:    Tracy Reddy is a 63 y.o. female who presented to [facility] on 4/3/2024 with complaints of cough.  She initially presented to [facility] ED with complaints of fever up to 103 °F, productive cough with green and brown sputum, shortness of air and chest tightness that has been ongoing for several days.  She reports she has family members that have been sick at home.  She reports she has also had a history of PE in the past.  Labs show troponin was 13 x 2, .8.  CBC unremarkable.  Respiratory panel was negative. Chest x-ray showed masslike opacity within the right lower lobe.  Sputum cultures and blood cultures have been ordered.  She is a non-smoker.  Pulmonology also has been consulted.     24 CT chest angiogram  Impression:  1. No evidence of pulmonary embolism.  2. No acute cardiopulmonary process. There is a mass present within the right lower lobe likely representing malignancy. There is enlarged right hilar adenopathy likely representing local metastatic disease. No previous imaging available for comparison.        24  Hematology/Oncology was consulted.       From record review: Patient has a history of bilateral ductal carcinoma diagnosed in .  Right breast IDC, G1, ER positive, MI positive, HER-2 negative. Left breast IDC, G2, ER positive, MI positive, HER-2 negative. Left SLND revealed 1 of 2 lymph nodes positive for macro metastasis. She is status post bilateral mastectomy with reconstruction in .  She is also completed neoadjuvant chemotherapy with 6 cycles of TCHP.  Taxotere and carboplatin with dose reductions at cycle 2 secondary to chemotherapy-induced nausea and vomiting.  Completed  adjuvant Herceptin Perjeta for 1 year.  Status post radiation therapy left chest wall and regional nodes, 5 fractions for a total dose of 5000 cGray. Current treatment with hormone blockade with Arimidex.  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  She follows at [facility], reviewed most recent office note from 3/11/2024.     4/5/2024 status post bronchoscopy.  EBUS guided FNA of the right lower lobe hilar mass confirmed metastatic ductal carcinoma of the breast ER +100% MT +10%, HER2 nu pending  Respiratory panel positive for human metapneumovirus.    4/12/2024 PET/CT with 1.1 cm lesion in the region of the left vallecula concerning for second primary malignancy.  Recommend correlation with direct visualization, large right hilar mass consistent with malignancy, no evidence of metastatic disease elsewhere    Path with HER 2 positive 3+ by IHC, [facility] NGS with PIK3CA      Subjective:  No new symptoms .symptomatically doing well.     Past Medical History:   Diagnosis Date    Cancer        Past Surgical History:   Procedure Laterality Date    BRONCHOSCOPY N/A 4/5/2024    Procedure: BRONCHOSCOPY WITH ENDOBRONCHIAL ULTRASOUND, bilateral bronchial washings, fine needle aspiration x 2 areas;  Surgeon: Edy Irving MD;  Location: [facility] ENDOSCOPY;  Service: Pulmonary;  Laterality: N/A;  post: lung mass    MASTECTOMY           Current Outpatient Medications:     anastrozole (ARIMIDEX) 1 MG tablet, Take 1 tablet by mouth Daily., Disp: , Rfl:     Calcium Citrate-Vitamin D (Citrus Calcium/Vitamin D) 200-6.25 MG-MCG tablet, Take  by mouth., Disp: , Rfl:     cetirizine (zyrTEC) 10 MG tablet, Take 1 tablet by mouth Daily., Disp: , Rfl:     Allergies   Allergen Reactions    Penicillins Rash       No family history on file.    Cancer-related family history is not on file.    Social History     Tobacco Use    Smoking status: Never     Passive exposure: Never    Smokeless tobacco: Never   Vaping Use    Vaping  status: Never Used   Substance Use Topics    Alcohol use: Never    Drug use: Never     Social History     Social History Narrative    Not on file      ROS:   Review of Systems   Constitutional:  Negative for fatigue and fever.   HENT:  Negative for congestion and nosebleeds.    Eyes:  Negative for pain.   Respiratory:  Negative for cough and shortness of breath.    Cardiovascular:  Negative for chest pain.   Gastrointestinal:  Negative for abdominal pain, blood in stool, diarrhea, nausea and vomiting.   Endocrine: Negative for cold intolerance and heat intolerance.   Genitourinary:  Negative for difficulty urinating.   Musculoskeletal:  Negative for arthralgias.   Skin:  Negative for rash.   Neurological:  Negative for dizziness and headaches.   Hematological:  Does not bruise/bleed easily.   Psychiatric/Behavioral:  Negative for behavioral problems.        Objective:  Vital signs:  There were no vitals filed for this visit.    There is no height or weight on file to calculate BMI.  ECOG  (0) Fully active, able to carry on all predisease performance without restriction    Physical Exam:   Physical Exam  Constitutional:       Appearance: Normal appearance.   HENT:      Head: Normocephalic and atraumatic.   Eyes:      Pupils: Pupils are equal, round, and reactive to light.   Cardiovascular:      Rate and Rhythm: Normal rate and regular rhythm.      Pulses: Normal pulses.      Heart sounds: No murmur heard.  Pulmonary:      Effort: Pulmonary effort is normal.      Breath sounds: Normal breath sounds.   Abdominal:      General: There is no distension.      Palpations: Abdomen is soft. There is no mass.      Tenderness: There is no abdominal tenderness.   Musculoskeletal:         General: Normal range of motion.      Cervical back: Normal range of motion and neck supple.   Skin:     General: Skin is warm.   Neurological:      General: No focal deficit present.      Mental Status: She is alert.   Psychiatric:         Mood  "and Affect: Mood normal.       Lab Results - Last 18 Months   Lab Units 04/18/24  0905 04/05/24 0415 04/03/24  2251   WBC 10*3/mm3 6.87 9.75 9.57   HEMOGLOBIN g/dL 13.2 12.2 13.0   HEMATOCRIT % 41.6 39.4 41.3   PLATELETS 10*3/mm3 244 180 192   MCV fL 91.4 89.5 89.0     Lab Results - Last 18 Months   Lab Units 04/05/24 0415 04/04/24  0606 04/03/24  2251   SODIUM mmol/L 140 138 134*   POTASSIUM mmol/L 4.2 4.0 3.9   CHLORIDE mmol/L 104 103 102   CO2 mmol/L 23.0 24.0 22.1   BUN mg/dL 11 16 19   CREATININE mg/dL 0.89 1.01* 1.05*   CALCIUM mg/dL 9.2 8.9 9.5   BILIRUBIN mg/dL  --   --  0.6   ALK PHOS U/L  --   --  80   ALT (SGPT) U/L  --   --  15   AST (SGOT) U/L  --   --  22   GLUCOSE mg/dL 94 125* 131*       Lab Results   Component Value Date    GLUCOSE 94 04/05/2024    BUN 11 04/05/2024    CREATININE 0.89 04/05/2024    EGFRIFNONA 57 (L) 11/10/2021    EGFRIFAFRI 66 11/10/2021    BCR 12.4 04/05/2024    K 4.2 04/05/2024    CO2 23.0 04/05/2024    CALCIUM 9.2 04/05/2024    ALBUMIN 4.2 04/03/2024    AST 22 04/03/2024    ALT 15 04/03/2024       Lab Results - Last 18 Months   Lab Units 04/05/24 0415   INR  0.99       No results found for: \"IRON\", \"TIBC\", \"FERRITIN\"    No results found for: \"FOLATE\"    No results found for: \"OCCULTBLD\"    No results found for: \"RETICCTPCT\"  No results found for: \"XGKOFDFE97\"  No results found for: \"SPEP\", \"UPEP\"  No results found for: \"LDH\", \"URICACID\"  No results found for: \"BABAK\", \"RF\", \"SEDRATE\"  No results found for: \"FIBRINOGEN\", \"HAPTOGLOBIN\"  Lab Results   Component Value Date    INR 0.99 04/05/2024     No results found for: \"\"  No results found for: \"CEA\"  No components found for: \"CA-19-9\"  No results found for: \"PSA\"  No results found for: \"SEDRATE\"    Assessment & Plan     Tracy Reddy is a 63 y.o. female with history of pulmonary embolism and bilateral invasive ductal carcinoma status post bilateral mastectomy, chemotherapy and radiation treatments, on Arimidex.  She " presented with dyspnea and was found to have a lung mass in the right lower lobe with right hilar and suprahilar lymphadenopathy.     Right lung mass-metastatic ductal carcinoma of the breast -recurrent  -CT imaging showed mass present within the medial right lower lobe measuring up to 3.2 x 4.1 cm that extends to the right hilar region.  Additional conglomerate of nodes present within right hilar to right suprahilar region measuring up to 2.7 x 1.8 cm.  Status post bronchoscopy EBUS 4/5/2024.    Biopsy confirms metastatic ductal carcinoma of the breast ER/HI positive, HER2 nu pending.  Further treatment will depend on HER2/nancy status.  Will also send tumor sample out for [employer] NGS testing.  She is on Arimidex right now but will need to switch to an alternate AI or a different agent given mutational analysis findings.  If HER2 positive disease, will use HER2 blockade.  Given there is no other clear areas of metastasis.  We can still consider curative treatment with either radio-ablation or surgical resection.  Discussed at tumor board. Not a surgical candidate based on tumor location. Radiation possible. Will plan on starting chemo with herceptin perjeta and then consider radiation after 6 cycles.  Will then continue herceptin perjeta maintenance  Will change hormonal therapy backbone to letrozole.  Discussed side effects and treatment plan in detail. Patient agreeable.      History of bilateral invasive ductal carcinoma  -Diagnosed in 2020. She is status post bilateral mastectomy with reconstruction in 2021.  She is also completed neoadjuvant chemotherapy with 6 cycles of TCHP.  Taxotere and carboplatin with dose reductions at cycle 2 secondary to chemotherapy-induced nausea and vomiting.  There is some confusion about her pathology as some reports show HER2 negative, some show positive.  However she did receive adjuvant Herceptin Perjeta maintenance which likely points towards HER2 positive disease.  She is also  known to have CHEK2 mutation.  Completed adjuvant Herceptin Perjeta for 1 year.  Status post radiation therapy left chest wall and regional nodes, 5 fractions for a total dose of 5000 cGray. Current treatment with hormone blockade with Arimidex. She follows at [facility] but will transfer care to us now     Pneumonia  -Respiratory pane positive for human metapneumovirus.  Resolved symptoms now    Osteopenia  Patient has been on Zometa,  I will follow-up the results on her last DEXA scan continue calcium vitamin D.     Time spent on encounter including record review, history taking, exam, discussion, counseling and documentation at: 46 minutes

## 2024-05-03 ENCOUNTER — OFFICE VISIT (OUTPATIENT)
Dept: ONCOLOGY | Facility: CLINIC | Age: 64
End: 2024-05-03
Payer: COMMERCIAL

## 2024-05-03 ENCOUNTER — HOSPITAL ENCOUNTER (OUTPATIENT)
Dept: ONCOLOGY | Facility: HOSPITAL | Age: 64
Discharge: HOME | End: 2024-05-03
Admitting: INTERNAL MEDICINE
Payer: COMMERCIAL

## 2024-05-03 VITALS
TEMPERATURE: 96.8 F | RESPIRATION RATE: 14 BRPM | SYSTOLIC BLOOD PRESSURE: 104 MMHG | HEART RATE: 100 BPM | HEIGHT: 64 IN | WEIGHT: 186.4 LBS | OXYGEN SATURATION: 94 % | BODY MASS INDEX: 31.82 KG/M2 | DIASTOLIC BLOOD PRESSURE: 70 MMHG

## 2024-05-03 DIAGNOSIS — R91.8 LUNG MASS: Primary | ICD-10-CM

## 2024-05-03 DIAGNOSIS — C78.00 CARCINOMA OF RIGHT BREAST METASTATIC TO LUNG: ICD-10-CM

## 2024-05-03 DIAGNOSIS — C50.911 CARCINOMA OF RIGHT BREAST METASTATIC TO LUNG: ICD-10-CM

## 2024-05-03 DIAGNOSIS — R91.8 RIGHT LOWER LOBE LUNG MASS: Primary | ICD-10-CM

## 2024-05-03 DIAGNOSIS — Z51.11 ENCOUNTER FOR ANTINEOPLASTIC CHEMOTHERAPY: ICD-10-CM

## 2024-05-03 PROBLEM — E86.0 DEHYDRATION: Status: ACTIVE | Noted: 2024-05-03

## 2024-05-03 LAB
BASOPHILS # BLD AUTO: 0.03 10*3/MM3 (ref 0–0.2)
BASOPHILS NFR BLD AUTO: 0.4 % (ref 0–1.5)
DEPRECATED RDW RBC AUTO: 48.2 FL (ref 37–54)
EOSINOPHIL # BLD AUTO: 0.31 10*3/MM3 (ref 0–0.4)
EOSINOPHIL NFR BLD AUTO: 4.2 % (ref 0.3–6.2)
ERYTHROCYTE [DISTWIDTH] IN BLOOD BY AUTOMATED COUNT: 14.9 % (ref 12.3–15.4)
FUNGUS WND CULT: NORMAL
HCT VFR BLD AUTO: 43.1 % (ref 34–46.6)
HGB BLD-MCNC: 13.7 G/DL (ref 12–15.9)
LYMPHOCYTES # BLD AUTO: 2.02 10*3/MM3 (ref 0.7–3.1)
LYMPHOCYTES NFR BLD AUTO: 27.4 % (ref 19.6–45.3)
MCH RBC QN AUTO: 29 PG (ref 26.6–33)
MCHC RBC AUTO-ENTMCNC: 31.8 G/DL (ref 31.5–35.7)
MCV RBC AUTO: 91.1 FL (ref 79–97)
MONOCYTES # BLD AUTO: 0.64 10*3/MM3 (ref 0.1–0.9)
MONOCYTES NFR BLD AUTO: 8.7 % (ref 5–12)
MYCOBACTERIUM SPEC CULT: NORMAL
NEUTROPHILS NFR BLD AUTO: 4.36 10*3/MM3 (ref 1.7–7)
NEUTROPHILS NFR BLD AUTO: 59.3 % (ref 42.7–76)
NIGHT BLUE STAIN TISS: NORMAL
PLATELET # BLD AUTO: 177 10*3/MM3 (ref 140–450)
PMV BLD AUTO: 9.3 FL (ref 6–12)
RBC # BLD AUTO: 4.73 10*6/MM3 (ref 3.77–5.28)
WBC NRBC COR # BLD AUTO: 7.36 10*3/MM3 (ref 3.4–10.8)

## 2024-05-03 PROCEDURE — 36591 DRAW BLOOD OFF VENOUS DEVICE: CPT

## 2024-05-03 PROCEDURE — 85025 COMPLETE CBC W/AUTO DIFF WBC: CPT | Performed by: INTERNAL MEDICINE

## 2024-05-03 PROCEDURE — 25010000002 HEPARIN LOCK FLUSH PER 10 UNITS: Performed by: INTERNAL MEDICINE

## 2024-05-03 RX ORDER — HEPARIN SODIUM (PORCINE) LOCK FLUSH IV SOLN 100 UNIT/ML 100 UNIT/ML
500 SOLUTION INTRAVENOUS AS NEEDED
OUTPATIENT
Start: 2024-05-03

## 2024-05-03 RX ORDER — SODIUM CHLORIDE 0.9 % (FLUSH) 0.9 %
10 SYRINGE (ML) INJECTION AS NEEDED
OUTPATIENT
Start: 2024-05-03

## 2024-05-03 RX ORDER — HEPARIN SODIUM (PORCINE) LOCK FLUSH IV SOLN 100 UNIT/ML 100 UNIT/ML
500 SOLUTION INTRAVENOUS AS NEEDED
Status: DISCONTINUED | OUTPATIENT
Start: 2024-05-03 | End: 2024-05-04 | Stop reason: HOSPADM

## 2024-05-03 RX ORDER — SODIUM CHLORIDE 0.9 % (FLUSH) 0.9 %
10 SYRINGE (ML) INJECTION AS NEEDED
Status: DISCONTINUED | OUTPATIENT
Start: 2024-05-03 | End: 2024-05-04 | Stop reason: HOSPADM

## 2024-05-03 RX ADMIN — Medication 10 ML: at 09:16

## 2024-05-03 RX ADMIN — HEPARIN 500 UNITS: 100 SYRINGE at 09:17

## 2024-05-03 NOTE — PROGRESS NOTES
Port accessed and flushed with good blood return noted. 10cc of blood wasted prior to specimen collection. Blood specimen obtained and sent to lab for processing per protocol.  Port flushed with saline and heparin prior to needle removal.

## 2024-05-09 ENCOUNTER — HOSPITAL ENCOUNTER (OUTPATIENT)
Dept: CARDIOLOGY | Facility: HOSPITAL | Age: 64
Discharge: HOME | End: 2024-05-09
Payer: COMMERCIAL

## 2024-05-09 VITALS
BODY MASS INDEX: 31.76 KG/M2 | DIASTOLIC BLOOD PRESSURE: 65 MMHG | HEART RATE: 101 BPM | HEIGHT: 64 IN | SYSTOLIC BLOOD PRESSURE: 131 MMHG | WEIGHT: 186 LBS

## 2024-05-09 DIAGNOSIS — Z51.11 ENCOUNTER FOR ANTINEOPLASTIC CHEMOTHERAPY: ICD-10-CM

## 2024-05-09 DIAGNOSIS — C78.00 CARCINOMA OF RIGHT BREAST METASTATIC TO LUNG: ICD-10-CM

## 2024-05-09 DIAGNOSIS — R91.8 RIGHT LOWER LOBE LUNG MASS: ICD-10-CM

## 2024-05-09 DIAGNOSIS — C50.911 CARCINOMA OF RIGHT BREAST METASTATIC TO LUNG: ICD-10-CM

## 2024-05-09 LAB
BH CV ECHO MEAS - ACS: 1.83 CM
BH CV ECHO MEAS - AO MAX PG: 5.8 MMHG
BH CV ECHO MEAS - AO MEAN PG: 3.2 MMHG
BH CV ECHO MEAS - AO ROOT DIAM: 3.2 CM
BH CV ECHO MEAS - AO V2 MAX: 120 CM/SEC
BH CV ECHO MEAS - AO V2 VTI: 22.1 CM
BH CV ECHO MEAS - AVA(I,D): 1.49 CM2
BH CV ECHO MEAS - EDV(CUBED): 58.4 ML
BH CV ECHO MEAS - EDV(MOD-SP4): 58.4 ML
BH CV ECHO MEAS - EF(MOD-BP): 63 %
BH CV ECHO MEAS - EF(MOD-SP4): 62.4 %
BH CV ECHO MEAS - ESV(CUBED): 15.1 ML
BH CV ECHO MEAS - ESV(MOD-SP4): 21.9 ML
BH CV ECHO MEAS - FS: 36.2 %
BH CV ECHO MEAS - IVS/LVPW: 0.85 CM
BH CV ECHO MEAS - IVSD: 0.85 CM
BH CV ECHO MEAS - LA DIMENSION: 3.7 CM
BH CV ECHO MEAS - LV MASS(C)D: 108 GRAMS
BH CV ECHO MEAS - LV MAX PG: 1.99 MMHG
BH CV ECHO MEAS - LV MEAN PG: 1.24 MMHG
BH CV ECHO MEAS - LV V1 MAX: 70.6 CM/SEC
BH CV ECHO MEAS - LV V1 VTI: 13.3 CM
BH CV ECHO MEAS - LVIDD: 3.9 CM
BH CV ECHO MEAS - LVIDS: 2.47 CM
BH CV ECHO MEAS - LVOT AREA: 2.48 CM2
BH CV ECHO MEAS - LVOT DIAM: 1.78 CM
BH CV ECHO MEAS - LVPWD: 1 CM
BH CV ECHO MEAS - MV A MAX VEL: 93 CM/SEC
BH CV ECHO MEAS - MV DEC SLOPE: 650.1 CM/SEC2
BH CV ECHO MEAS - MV DEC TIME: 0.1 SEC
BH CV ECHO MEAS - MV E MAX VEL: 67 CM/SEC
BH CV ECHO MEAS - MV E/A: 0.72
BH CV ECHO MEAS - MV MAX PG: 3.2 MMHG
BH CV ECHO MEAS - MV MEAN PG: 1.74 MMHG
BH CV ECHO MEAS - MV V2 VTI: 19.2 CM
BH CV ECHO MEAS - MVA(VTI): 1.72 CM2
BH CV ECHO MEAS - PA V2 MAX: 122.3 CM/SEC
BH CV ECHO MEAS - PULM A REVS DUR: 0.08 SEC
BH CV ECHO MEAS - PULM A REVS VEL: 24.3 CM/SEC
BH CV ECHO MEAS - PULM DIAS VEL: 39.3 CM/SEC
BH CV ECHO MEAS - PULM S/D: 1.87
BH CV ECHO MEAS - PULM SYS VEL: 73.5 CM/SEC
BH CV ECHO MEAS - RV MAX PG: 1.17 MMHG
BH CV ECHO MEAS - RV V1 MAX: 54 CM/SEC
BH CV ECHO MEAS - RV V1 VTI: 9.1 CM
BH CV ECHO MEAS - SV(LVOT): 33 ML
BH CV ECHO MEAS - SV(MOD-SP4): 36.5 ML
BH CV ECHO MEAS - TR MAX PG: 15.8 MMHG
BH CV ECHO MEAS - TR MAX VEL: 195.3 CM/SEC

## 2024-05-09 PROCEDURE — 93356 MYOCRD STRAIN IMG SPCKL TRCK: CPT

## 2024-05-09 PROCEDURE — 93306 TTE W/DOPPLER COMPLETE: CPT

## 2024-05-10 ENCOUNTER — PATIENT OUTREACH (OUTPATIENT)
Dept: ONCOLOGY | Facility: CLINIC | Age: 64
End: 2024-05-10
Payer: COMMERCIAL

## 2024-05-10 ENCOUNTER — TELEPHONE (OUTPATIENT)
Dept: ONCOLOGY | Facility: CLINIC | Age: 64
End: 2024-05-10
Payer: COMMERCIAL

## 2024-05-10 DIAGNOSIS — C50.912 BILATERAL MALIGNANT NEOPLASM OF BREAST IN FEMALE, ESTROGEN RECEPTOR POSITIVE, UNSPECIFIED SITE OF BREAST: Primary | ICD-10-CM

## 2024-05-10 DIAGNOSIS — C50.911 BILATERAL MALIGNANT NEOPLASM OF BREAST IN FEMALE, ESTROGEN RECEPTOR POSITIVE, UNSPECIFIED SITE OF BREAST: Primary | ICD-10-CM

## 2024-05-10 DIAGNOSIS — Z17.0 BILATERAL MALIGNANT NEOPLASM OF BREAST IN FEMALE, ESTROGEN RECEPTOR POSITIVE, UNSPECIFIED SITE OF BREAST: Primary | ICD-10-CM

## 2024-05-10 LAB
MYCOBACTERIUM SPEC CULT: NORMAL
NIGHT BLUE STAIN TISS: NORMAL

## 2024-05-10 RX ORDER — DEXAMETHASONE 4 MG/1
TABLET ORAL
Qty: 12 TABLET | Refills: 3 | Status: SHIPPED | OUTPATIENT
Start: 2024-05-10

## 2024-05-10 RX ORDER — ONDANSETRON HYDROCHLORIDE 8 MG/1
8 TABLET, FILM COATED ORAL 3 TIMES DAILY PRN
Qty: 30 TABLET | Refills: 5 | Status: SHIPPED | OUTPATIENT
Start: 2024-05-10

## 2024-05-13 ENCOUNTER — HOSPITAL ENCOUNTER (OUTPATIENT)
Dept: ONCOLOGY | Facility: HOSPITAL | Age: 64
Discharge: HOME | End: 2024-05-13
Admitting: INTERNAL MEDICINE
Payer: COMMERCIAL

## 2024-05-13 VITALS
HEIGHT: 64 IN | SYSTOLIC BLOOD PRESSURE: 123 MMHG | TEMPERATURE: 97.6 F | HEART RATE: 100 BPM | DIASTOLIC BLOOD PRESSURE: 78 MMHG | RESPIRATION RATE: 16 BRPM | OXYGEN SATURATION: 92 % | BODY MASS INDEX: 31.65 KG/M2 | WEIGHT: 185.4 LBS

## 2024-05-13 DIAGNOSIS — R91.8 LUNG MASS: ICD-10-CM

## 2024-05-13 DIAGNOSIS — C50.912 BILATERAL MALIGNANT NEOPLASM OF BREAST IN FEMALE, ESTROGEN RECEPTOR POSITIVE, UNSPECIFIED SITE OF BREAST: Primary | ICD-10-CM

## 2024-05-13 DIAGNOSIS — Z17.0 BILATERAL MALIGNANT NEOPLASM OF BREAST IN FEMALE, ESTROGEN RECEPTOR POSITIVE, UNSPECIFIED SITE OF BREAST: Primary | ICD-10-CM

## 2024-05-13 DIAGNOSIS — C50.911 BILATERAL MALIGNANT NEOPLASM OF BREAST IN FEMALE, ESTROGEN RECEPTOR POSITIVE, UNSPECIFIED SITE OF BREAST: Primary | ICD-10-CM

## 2024-05-13 LAB
ALBUMIN SERPL-MCNC: 4.3 G/DL (ref 3.5–5.2)
ALBUMIN/GLOB SERPL: 1.3 G/DL
ALP BLD-CCNC: 86 U/L (ref 42–141)
ALP SERPL-CCNC: 92 U/L (ref 39–117)
ALT SERPL W P-5'-P-CCNC: 10 U/L (ref 1–33)
ANION GAP SERPL CALCULATED.3IONS-SCNC: 14 MMOL/L (ref 5–15)
AST SERPL-CCNC: 17 U/L (ref 1–32)
BASOPHILS # BLD AUTO: 0.01 10*3/MM3 (ref 0–0.2)
BASOPHILS NFR BLD AUTO: 0.1 % (ref 0–1.5)
BILIRUB SERPL-MCNC: 0.5 MG/DL (ref 0–1.2)
BUN BLDA-MCNC: 19 MG/DL (ref 7–22)
BUN SERPL-MCNC: 21 MG/DL (ref 8–23)
BUN/CREAT SERPL: 22.1 (ref 7–25)
CALCIUM BLD QL: 9.6 MG/DL (ref 8–10.3)
CALCIUM SPEC-SCNC: 9.7 MG/DL (ref 8.6–10.5)
CHLORIDE BLDA-SCNC: 105 MMOL/L (ref 98–108)
CHLORIDE SERPL-SCNC: 105 MMOL/L (ref 98–107)
CO2 BLDA-SCNC: 25 MMOL/L (ref 18–33)
CO2 SERPL-SCNC: 22 MMOL/L (ref 22–29)
CREAT BLDA-MCNC: 0.9 MG/DL (ref 0.6–1.2)
CREAT SERPL-MCNC: 0.95 MG/DL (ref 0.57–1)
DEPRECATED RDW RBC AUTO: 49 FL (ref 37–54)
EGFRCR SERPLBLD CKD-EPI 2021: 67.9 ML/MIN/1.73
EGFRCR SERPLBLD CKD-EPI 2021: 72.4 ML/MIN/1.73
EOSINOPHIL # BLD AUTO: 0.01 10*3/MM3 (ref 0–0.4)
EOSINOPHIL NFR BLD AUTO: 0.1 % (ref 0.3–6.2)
ERYTHROCYTE [DISTWIDTH] IN BLOOD BY AUTOMATED COUNT: 15 % (ref 12.3–15.4)
GLOBULIN UR ELPH-MCNC: 3.3 GM/DL
GLUCOSE BLDC GLUCOMTR-MCNC: 190 MG/DL (ref 73–118)
GLUCOSE SERPL-MCNC: 192 MG/DL (ref 65–99)
HCT VFR BLD AUTO: 42.5 % (ref 34–46.6)
HGB BLD-MCNC: 13.5 G/DL (ref 12–15.9)
LYMPHOCYTES # BLD AUTO: 0.77 10*3/MM3 (ref 0.7–3.1)
LYMPHOCYTES NFR BLD AUTO: 6 % (ref 19.6–45.3)
MCH RBC QN AUTO: 29 PG (ref 26.6–33)
MCHC RBC AUTO-ENTMCNC: 31.8 G/DL (ref 31.5–35.7)
MCV RBC AUTO: 91.2 FL (ref 79–97)
MONOCYTES # BLD AUTO: 0.38 10*3/MM3 (ref 0.1–0.9)
MONOCYTES NFR BLD AUTO: 3 % (ref 5–12)
NEUTROPHILS NFR BLD AUTO: 11.66 10*3/MM3 (ref 1.7–7)
NEUTROPHILS NFR BLD AUTO: 90.8 % (ref 42.7–76)
PLATELET # BLD AUTO: 216 10*3/MM3 (ref 140–450)
PMV BLD AUTO: 9.7 FL (ref 6–12)
POC ALBUMIN: 3.6 G/L (ref 3.3–5.5)
POC ALT (SGPT): 15 U/L (ref 10–47)
POC AST (SGOT): 23 U/L (ref 11–38)
POC TOTAL BILIRUBIN: 0.9 MG/DL (ref 0.2–1.6)
POC TOTAL PROTEIN: 7.6 G/DL (ref 6.4–8.1)
POTASSIUM BLDA-SCNC: 3.9 MMOL/L (ref 3.6–5.1)
POTASSIUM SERPL-SCNC: 4.1 MMOL/L (ref 3.5–5.2)
PROT SERPL-MCNC: 7.6 G/DL (ref 6–8.5)
RBC # BLD AUTO: 4.66 10*6/MM3 (ref 3.77–5.28)
SODIUM BLD-SCNC: 146 MMOL/L (ref 128–145)
SODIUM SERPL-SCNC: 141 MMOL/L (ref 136–145)
WBC NRBC COR # BLD AUTO: 12.83 10*3/MM3 (ref 3.4–10.8)

## 2024-05-13 PROCEDURE — 25810000003 SODIUM CHLORIDE 0.9 % SOLUTION: Performed by: INTERNAL MEDICINE

## 2024-05-13 PROCEDURE — 96417 CHEMO IV INFUS EACH ADDL SEQ: CPT

## 2024-05-13 PROCEDURE — 25010000002 HEPARIN LOCK FLUSH PER 10 UNITS: Performed by: INTERNAL MEDICINE

## 2024-05-13 PROCEDURE — 25810000003 SODIUM CHLORIDE 0.9 % SOLUTION 250 ML FLEX CONT: Performed by: INTERNAL MEDICINE

## 2024-05-13 PROCEDURE — 96415 CHEMO IV INFUSION ADDL HR: CPT

## 2024-05-13 PROCEDURE — 25010000002 DOCETAXEL 20 MG/ML SOLUTION 8 ML VIAL: Performed by: INTERNAL MEDICINE

## 2024-05-13 PROCEDURE — 80053 COMPREHEN METABOLIC PANEL: CPT | Performed by: INTERNAL MEDICINE

## 2024-05-13 PROCEDURE — 25010000002 PERTUZUMAB 420 MG/14ML SOLUTION 420 MG VIAL: Performed by: INTERNAL MEDICINE

## 2024-05-13 PROCEDURE — 96413 CHEMO IV INFUSION 1 HR: CPT

## 2024-05-13 PROCEDURE — 25010000002 TRASTUZUMAB-ANNS 420 MG RECONSTITUTED SOLUTION 1 EACH VIAL: Performed by: INTERNAL MEDICINE

## 2024-05-13 PROCEDURE — 80053 COMPREHEN METABOLIC PANEL: CPT

## 2024-05-13 PROCEDURE — 85025 COMPLETE CBC W/AUTO DIFF WBC: CPT | Performed by: INTERNAL MEDICINE

## 2024-05-13 RX ORDER — OMEPRAZOLE 20 MG/1
20 CAPSULE, DELAYED RELEASE ORAL DAILY
Qty: 30 CAPSULE | Refills: 5 | Status: SHIPPED | OUTPATIENT
Start: 2024-05-13

## 2024-05-13 RX ORDER — HEPARIN SODIUM (PORCINE) LOCK FLUSH IV SOLN 100 UNIT/ML 100 UNIT/ML
500 SOLUTION INTRAVENOUS AS NEEDED
Status: CANCELLED | OUTPATIENT
Start: 2024-05-13

## 2024-05-13 RX ORDER — SODIUM CHLORIDE 0.9 % (FLUSH) 0.9 %
10 SYRINGE (ML) INJECTION AS NEEDED
Status: DISCONTINUED | OUTPATIENT
Start: 2024-05-13 | End: 2024-05-14 | Stop reason: HOSPADM

## 2024-05-13 RX ORDER — SODIUM CHLORIDE 9 MG/ML
20 INJECTION, SOLUTION INTRAVENOUS ONCE
Status: COMPLETED | OUTPATIENT
Start: 2024-05-13 | End: 2024-05-13

## 2024-05-13 RX ORDER — SODIUM CHLORIDE 0.9 % (FLUSH) 0.9 %
10 SYRINGE (ML) INJECTION AS NEEDED
Status: CANCELLED | OUTPATIENT
Start: 2024-05-13

## 2024-05-13 RX ORDER — DIPHENHYDRAMINE HYDROCHLORIDE 50 MG/ML
50 INJECTION INTRAMUSCULAR; INTRAVENOUS AS NEEDED
Status: CANCELLED | OUTPATIENT
Start: 2024-05-13

## 2024-05-13 RX ORDER — HEPARIN SODIUM (PORCINE) LOCK FLUSH IV SOLN 100 UNIT/ML 100 UNIT/ML
500 SOLUTION INTRAVENOUS AS NEEDED
Status: DISCONTINUED | OUTPATIENT
Start: 2024-05-13 | End: 2024-05-14 | Stop reason: HOSPADM

## 2024-05-13 RX ORDER — FAMOTIDINE 10 MG/ML
20 INJECTION, SOLUTION INTRAVENOUS AS NEEDED
Status: CANCELLED | OUTPATIENT
Start: 2024-05-13

## 2024-05-13 RX ORDER — SODIUM CHLORIDE 9 MG/ML
20 INJECTION, SOLUTION INTRAVENOUS ONCE
Status: CANCELLED | OUTPATIENT
Start: 2024-05-13

## 2024-05-13 RX ORDER — PROMETHAZINE HYDROCHLORIDE 12.5 MG/1
12.5 TABLET ORAL EVERY 6 HOURS PRN
Qty: 30 TABLET | Refills: 5 | Status: SHIPPED | OUTPATIENT
Start: 2024-05-13

## 2024-05-13 RX ADMIN — Medication 10 ML: at 15:29

## 2024-05-13 RX ADMIN — SODIUM CHLORIDE 20 ML/HR: 9 INJECTION, SOLUTION INTRAVENOUS at 09:45

## 2024-05-13 RX ADMIN — HEPARIN 500 UNITS: 100 SYRINGE at 15:29

## 2024-05-13 RX ADMIN — DOCETAXEL 145 MG: 20 INJECTION, SOLUTION, CONCENTRATE INTRAVENOUS at 14:16

## 2024-05-13 RX ADMIN — PERTUZUMAB 840 MG: 30 INJECTION, SOLUTION, CONCENTRATE INTRAVENOUS at 10:23

## 2024-05-13 RX ADMIN — TRASTUZUMAB-ANNS 680 MG: 420 INJECTION, POWDER, LYOPHILIZED, FOR SOLUTION INTRAVENOUS at 12:33

## 2024-05-16 ENCOUNTER — HOSPITAL ENCOUNTER (OUTPATIENT)
Dept: ONCOLOGY | Facility: HOSPITAL | Age: 64
Discharge: HOME | End: 2024-05-16
Payer: COMMERCIAL

## 2024-05-16 VITALS
OXYGEN SATURATION: 92 % | BODY MASS INDEX: 31.82 KG/M2 | RESPIRATION RATE: 14 BRPM | HEART RATE: 102 BPM | DIASTOLIC BLOOD PRESSURE: 64 MMHG | SYSTOLIC BLOOD PRESSURE: 120 MMHG | HEIGHT: 64 IN

## 2024-05-16 DIAGNOSIS — E86.0 DEHYDRATION: Primary | ICD-10-CM

## 2024-05-16 DIAGNOSIS — R91.8 LUNG MASS: ICD-10-CM

## 2024-05-16 PROCEDURE — 25010000002 HEPARIN LOCK FLUSH PER 10 UNITS: Performed by: INTERNAL MEDICINE

## 2024-05-16 PROCEDURE — 25010000002 ONDANSETRON PER 1 MG: Performed by: INTERNAL MEDICINE

## 2024-05-16 PROCEDURE — 96361 HYDRATE IV INFUSION ADD-ON: CPT

## 2024-05-16 PROCEDURE — 96374 THER/PROPH/DIAG INJ IV PUSH: CPT

## 2024-05-16 PROCEDURE — 25810000003 SODIUM CHLORIDE 0.9 % SOLUTION: Performed by: INTERNAL MEDICINE

## 2024-05-16 RX ORDER — HEPARIN SODIUM (PORCINE) LOCK FLUSH IV SOLN 100 UNIT/ML 100 UNIT/ML
500 SOLUTION INTRAVENOUS AS NEEDED
OUTPATIENT
Start: 2024-05-16

## 2024-05-16 RX ORDER — HEPARIN SODIUM (PORCINE) LOCK FLUSH IV SOLN 100 UNIT/ML 100 UNIT/ML
500 SOLUTION INTRAVENOUS AS NEEDED
Status: DISCONTINUED | OUTPATIENT
Start: 2024-05-16 | End: 2024-05-17 | Stop reason: HOSPADM

## 2024-05-16 RX ORDER — SODIUM CHLORIDE 0.9 % (FLUSH) 0.9 %
10 SYRINGE (ML) INJECTION AS NEEDED
Status: DISCONTINUED | OUTPATIENT
Start: 2024-05-16 | End: 2024-05-17 | Stop reason: HOSPADM

## 2024-05-16 RX ORDER — ONDANSETRON 2 MG/ML
8 INJECTION INTRAMUSCULAR; INTRAVENOUS ONCE
Status: COMPLETED | OUTPATIENT
Start: 2024-05-16 | End: 2024-05-16

## 2024-05-16 RX ORDER — SODIUM CHLORIDE 0.9 % (FLUSH) 0.9 %
10 SYRINGE (ML) INJECTION AS NEEDED
OUTPATIENT
Start: 2024-05-16

## 2024-05-16 RX ADMIN — Medication 10 ML: at 14:55

## 2024-05-16 RX ADMIN — ONDANSETRON HYDROCHLORIDE 8 MG: 2 INJECTION, SOLUTION INTRAMUSCULAR; INTRAVENOUS at 13:55

## 2024-05-16 RX ADMIN — HEPARIN 500 UNITS: 100 SYRINGE at 14:55

## 2024-05-16 RX ADMIN — SODIUM CHLORIDE 1000 ML: 9 INJECTION, SOLUTION INTRAVENOUS at 13:50

## 2024-05-17 LAB
MYCOBACTERIUM SPEC CULT: NORMAL
NIGHT BLUE STAIN TISS: NORMAL

## 2024-05-29 NOTE — PROGRESS NOTES
HEMATOLOGY ONCOLOGY OUTPATIENT FOLLOW UP       Patient name: Tracy Reddy  : 1960  MRN: [id number]  Primary Care Physician: Provider, No Known  Referring Physician: Provider, No Known  Reason For Consult: breast cancer    History of Present Illness:    Tracy Reddy is a 63 y.o. female who presented to [facility] on 4/3/2024 with complaints of cough.  She initially presented to [facility] ED with complaints of fever up to 103 °F, productive cough with green and brown sputum, shortness of air and chest tightness that has been ongoing for several days.  She reports she has family members that have been sick at home.  She reports she has also had a history of PE in the past.  Labs show troponin was 13 x 2, .8.  CBC unremarkable.  Respiratory panel was negative. Chest x-ray showed masslike opacity within the right lower lobe.  Sputum cultures and blood cultures have been ordered.  She is a non-smoker.  Pulmonology also has been consulted.     24 CT chest angiogram  Impression:  1. No evidence of pulmonary embolism.  2. No acute cardiopulmonary process. There is a mass present within the right lower lobe likely representing malignancy. There is enlarged right hilar adenopathy likely representing local metastatic disease. No previous imaging available for comparison.        24  Hematology/Oncology was consulted.       From record review: Patient has a history of bilateral ductal carcinoma diagnosed in .  Right breast IDC, G1, ER positive, MD positive, HER-2 negative. Left breast IDC, G2, ER positive, MD positive, HER-2 negative. Left SLND revealed 1 of 2 lymph nodes positive for macro metastasis. She is status post bilateral mastectomy with reconstruction in .  She is also completed neoadjuvant chemotherapy with 6 cycles of TCHP.  Taxotere and carboplatin with dose reductions at cycle 2 secondary to chemotherapy-induced nausea and vomiting.  Completed adjuvant  Herceptin Perjeta for 1 year.  Status post radiation therapy left chest wall and regional nodes, 5 fractions for a total dose of 5000 cGray. Current treatment with hormone blockade with Arimidex.  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  She follows at [facility], reviewed most recent office note from 3/11/2024.     4/5/2024 status post bronchoscopy.  EBUS guided FNA of the right lower lobe hilar mass confirmed metastatic ductal carcinoma of the breast ER +100% NC +10%, HER2 nu pending  Respiratory panel positive for human metapneumovirus.    4/12/2024 PET/CT with 1.1 cm lesion in the region of the left vallecula concerning for second primary malignancy.  Recommend correlation with direct visualization, large right hilar mass consistent with malignancy, no evidence of metastatic disease elsewhere    Path with HER 2 positive 3+ by IHC, [facility] NGS with PIK3CA     5/13/24 - started THP     Subjective:  Some diarrhea, no nausea, insomnia on steroids, had some chills and body aches.    Past Medical History:   Diagnosis Date    Cancer        Past Surgical History:   Procedure Laterality Date    BRONCHOSCOPY N/A 4/5/2024    Procedure: BRONCHOSCOPY WITH ENDOBRONCHIAL ULTRASOUND, bilateral bronchial washings, fine needle aspiration x 2 areas;  Surgeon: Edy Irving MD;  Location: [facility] ENDOSCOPY;  Service: Pulmonary;  Laterality: N/A;  post: lung mass    MASTECTOMY           Current Outpatient Medications:     anastrozole (ARIMIDEX) 1 MG tablet, Take 1 tablet by mouth Daily. (Patient not taking: Reported on 5/13/2024), Disp: , Rfl:     Calcium Citrate-Vitamin D (Citrus Calcium/Vitamin D) 200-6.25 MG-MCG tablet, Take  by mouth., Disp: , Rfl:     cetirizine (zyrTEC) 10 MG tablet, Take 1 tablet by mouth Daily., Disp: , Rfl:     dexAMETHasone (DECADRON) 4 MG tablet, Take 2 tablets oral twice a day for 3 consecutive days beginning the day before chemotherapy and continue for 6 doses., Disp: 12 tablet,  Rfl: 3    omeprazole (priLOSEC) 20 MG capsule, Take 1 capsule by mouth Daily., Disp: 30 capsule, Rfl: 5    ondansetron (ZOFRAN) 8 MG tablet, Take 1 tablet by mouth 3 (Three) Times a Day As Needed for Nausea or Vomiting., Disp: 30 tablet, Rfl: 5    promethazine (PHENERGAN) 12.5 MG tablet, Take 1 tablet by mouth Every 6 (Six) Hours As Needed for Nausea or Vomiting., Disp: 30 tablet, Rfl: 5  No current facility-administered medications for this visit.    Facility-Administered Medications Ordered in Other Visits:     heparin injection 500 Units, 500 Units, Intravenous, PRN, Pippa Horn MD    sodium chloride 0.9 % flush 10 mL, 10 mL, Intravenous, PRN, Pippa Horn MD    Allergies   Allergen Reactions    Penicillins Rash       No family history on file.    Cancer-related family history is not on file.    Social History     Tobacco Use    Smoking status: Never     Passive exposure: Never    Smokeless tobacco: Never   Vaping Use    Vaping status: Never Used   Substance Use Topics    Alcohol use: Never    Drug use: Never     Social History     Social History Narrative    Not on file      ROS:   Review of Systems   Constitutional:  Negative for fatigue and fever.   HENT:  Negative for congestion and nosebleeds.    Eyes:  Negative for pain and itching.   Respiratory:  Negative for cough and shortness of breath.    Cardiovascular:  Negative for chest pain.   Gastrointestinal:  Negative for abdominal pain, blood in stool, diarrhea, nausea and vomiting.   Endocrine: Negative for cold intolerance and heat intolerance.   Genitourinary:  Negative for difficulty urinating.   Musculoskeletal:  Negative for arthralgias.   Skin:  Negative for rash.   Neurological:  Negative for dizziness and headaches.   Hematological:  Does not bruise/bleed easily.   Psychiatric/Behavioral:  Negative for behavioral problems.        Objective:  Vital signs:  Vitals:    06/03/24 0833   BP: 133/76   Pulse: 97   Resp: 16   Temp: 97.6 °F (36.4 °C)  "  SpO2: 94%   Weight: 83 kg (183 lb)   Height: 162.6 cm (64\")   PainSc: 0-No pain       Body mass index is 31.41 kg/m².  ECOG  (0) Fully active, able to carry on all predisease performance without restriction    Physical Exam:   Physical Exam  Constitutional:       Appearance: Normal appearance.   HENT:      Head: Normocephalic and atraumatic.   Eyes:      Pupils: Pupils are equal, round, and reactive to light.   Cardiovascular:      Rate and Rhythm: Normal rate and regular rhythm.      Pulses: Normal pulses.      Heart sounds: Normal heart sounds. No murmur heard.  Pulmonary:      Effort: Pulmonary effort is normal.      Breath sounds: Normal breath sounds.   Abdominal:      General: There is no distension.      Palpations: Abdomen is soft. There is no mass.      Tenderness: There is no abdominal tenderness.   Musculoskeletal:         General: Normal range of motion.      Cervical back: Normal range of motion and neck supple.   Skin:     General: Skin is warm.   Neurological:      General: No focal deficit present.      Mental Status: She is alert.   Psychiatric:         Mood and Affect: Mood normal.         Lab Results - Last 18 Months   Lab Units 06/03/24  0834 05/13/24  0835 05/03/24  0859   WBC 10*3/mm3 13.55* 12.83* 7.36   HEMOGLOBIN g/dL 12.9 13.5 13.7   HEMATOCRIT % 39.9 42.5 43.1   PLATELETS 10*3/mm3 232 216 177   MCV fL 89.3 91.2 91.1     Lab Results - Last 18 Months   Lab Units 05/13/24  0909 05/13/24  0835 04/05/24  0415 04/04/24  0606 04/03/24  2251   SODIUM mmol/L  --  141 140 138 134*   POTASSIUM mmol/L  --  4.1 4.2 4.0 3.9   CHLORIDE mmol/L  --  105 104 103 102   CO2 mmol/L  --  22.0 23.0 24.0 22.1   BUN mg/dL  --  21 11 16 19   CREATININE mg/dL 0.90 0.95 0.89 1.01* 1.05*   CALCIUM mg/dL  --  9.7 9.2 8.9 9.5   BILIRUBIN mg/dL  --  0.5  --   --  0.6   ALK PHOS U/L  --  92  --   --  80   ALT (SGPT) U/L  --  10  --   --  15   AST (SGOT) U/L  --  17  --   --  22   GLUCOSE mg/dL  --  192* 94 125* 131* " "      Lab Results   Component Value Date    GLUCOSE 192 (H) 05/13/2024    BUN 21 05/13/2024    CREATININE 0.90 05/13/2024    EGFRIFNONA 57 (L) 11/10/2021    EGFRIFAFRI 66 11/10/2021    BCR 22.1 05/13/2024    K 4.1 05/13/2024    CO2 22.0 05/13/2024    CALCIUM 9.7 05/13/2024    ALBUMIN 4.3 05/13/2024    AST 17 05/13/2024    ALT 10 05/13/2024       Lab Results - Last 18 Months   Lab Units 04/05/24  0415   INR  0.99       No results found for: \"IRON\", \"TIBC\", \"FERRITIN\"    No results found for: \"FOLATE\"    No results found for: \"OCCULTBLD\"    No results found for: \"RETICCTPCT\"  No results found for: \"ETJGHFHQ01\"  No results found for: \"SPEP\", \"UPEP\"  No results found for: \"LDH\", \"URICACID\"  No results found for: \"BABAK\", \"RF\", \"SEDRATE\"  No results found for: \"FIBRINOGEN\", \"HAPTOGLOBIN\"  Lab Results   Component Value Date    INR 0.99 04/05/2024     No results found for: \"\"  No results found for: \"CEA\"  No components found for: \"CA-19-9\"  No results found for: \"PSA\"  No results found for: \"SEDRATE\"    Assessment & Plan     Tracy Reddy is a 63 y.o. female with history of pulmonary embolism and bilateral invasive ductal carcinoma status post bilateral mastectomy, chemotherapy and radiation treatments, on Arimidex.  She presented with dyspnea and was found to have a lung mass in the right lower lobe with right hilar and suprahilar lymphadenopathy.     Right lung mass-metastatic ductal carcinoma of the breast -recurrent  -CT imaging showed mass present within the medial right lower lobe measuring up to 3.2 x 4.1 cm that extends to the right hilar region.  Additional conglomerate of nodes present within right hilar to right suprahilar region measuring up to 2.7 x 1.8 cm.  Status post bronchoscopy EBUS 4/5/2024.    Biopsy confirms metastatic ductal carcinoma of the breast ER/CA positive, HER2 nu pending.  Further treatment will depend on HER2/nancy status.  Will also send tumor sample out for [employer] NGS testing.  She is " on Arimidex right now but will need to switch to an alternate AI or a different agent given mutational analysis findings.  If HER2 positive disease, will use HER2 blockade.  Given there is no other clear areas of metastasis.  We can still consider curative treatment with either radio-ablation or surgical resection.  Discussed at tumor board. Not a surgical candidate based on tumor location. Radiation possible. Will plan on starting chemo with herceptin perjeta and then consider radiation after 6 cycles.  Will then continue herceptin perjeta maintenance  Now started treatment had some side effects, not enough for modification.    Insomnia  - complains more with steroids  Will decrease steroids to 2 days only if still has ongoing symptoms with melatonin. She will try melatonin tonight.      History of bilateral invasive ductal carcinoma  -Diagnosed in 2020. She is status post bilateral mastectomy with reconstruction in 2021.  She is also completed neoadjuvant chemotherapy with 6 cycles of TCHP.  Taxotere and carboplatin with dose reductions at cycle 2 secondary to chemotherapy-induced nausea and vomiting.  There is some confusion about her pathology as some reports show HER2 negative, some show positive.  However she did receive adjuvant Herceptin Perjeta maintenance which likely points towards HER2 positive disease.  She is also known to have CHEK2 mutation.  Completed adjuvant Herceptin Perjeta for 1 year.  Status post radiation therapy left chest wall and regional nodes, 5 fractions for a total dose of 5000 cGray. Current treatment with hormone blockade with Arimidex. She follows at [facility] but will transfer care to us now     Pneumonia  -Respiratory pane positive for human metapneumovirus.  Resolved symptoms now    Osteopenia  Patient has been on Zometa,  DEXA with osteopenia. continue calcium vitamin D.    Rash  Likely herceptin related  Lasted for a week   Recommend hydrocortisone  topical      Time spent on encounter including record review, history taking, exam, discussion, counseling and documentation at: 40 minutes

## 2024-06-03 ENCOUNTER — OFFICE VISIT (OUTPATIENT)
Dept: ONCOLOGY | Facility: CLINIC | Age: 64
End: 2024-06-03
Payer: COMMERCIAL

## 2024-06-03 ENCOUNTER — HOSPITAL ENCOUNTER (OUTPATIENT)
Dept: ONCOLOGY | Facility: HOSPITAL | Age: 64
Discharge: HOME | End: 2024-06-03
Admitting: INTERNAL MEDICINE
Payer: COMMERCIAL

## 2024-06-03 VITALS
RESPIRATION RATE: 16 BRPM | SYSTOLIC BLOOD PRESSURE: 133 MMHG | TEMPERATURE: 97.6 F | HEIGHT: 64 IN | HEART RATE: 97 BPM | OXYGEN SATURATION: 94 % | DIASTOLIC BLOOD PRESSURE: 76 MMHG | BODY MASS INDEX: 31.24 KG/M2 | WEIGHT: 183 LBS

## 2024-06-03 VITALS
TEMPERATURE: 97.6 F | SYSTOLIC BLOOD PRESSURE: 133 MMHG | RESPIRATION RATE: 16 BRPM | DIASTOLIC BLOOD PRESSURE: 76 MMHG | BODY MASS INDEX: 31.31 KG/M2 | HEART RATE: 97 BPM | OXYGEN SATURATION: 94 % | HEIGHT: 64 IN | WEIGHT: 183.4 LBS

## 2024-06-03 DIAGNOSIS — C78.00 CARCINOMA OF RIGHT BREAST METASTATIC TO LUNG: Primary | ICD-10-CM

## 2024-06-03 DIAGNOSIS — C50.911 BILATERAL MALIGNANT NEOPLASM OF BREAST IN FEMALE, ESTROGEN RECEPTOR POSITIVE, UNSPECIFIED SITE OF BREAST: ICD-10-CM

## 2024-06-03 DIAGNOSIS — R91.8 LUNG MASS: ICD-10-CM

## 2024-06-03 DIAGNOSIS — C50.912 BILATERAL MALIGNANT NEOPLASM OF BREAST IN FEMALE, ESTROGEN RECEPTOR POSITIVE, UNSPECIFIED SITE OF BREAST: ICD-10-CM

## 2024-06-03 DIAGNOSIS — Z17.0 BILATERAL MALIGNANT NEOPLASM OF BREAST IN FEMALE, ESTROGEN RECEPTOR POSITIVE, UNSPECIFIED SITE OF BREAST: ICD-10-CM

## 2024-06-03 DIAGNOSIS — C50.911 BILATERAL MALIGNANT NEOPLASM OF BREAST IN FEMALE, ESTROGEN RECEPTOR POSITIVE, UNSPECIFIED SITE OF BREAST: Primary | ICD-10-CM

## 2024-06-03 DIAGNOSIS — C50.912 BILATERAL MALIGNANT NEOPLASM OF BREAST IN FEMALE, ESTROGEN RECEPTOR POSITIVE, UNSPECIFIED SITE OF BREAST: Primary | ICD-10-CM

## 2024-06-03 DIAGNOSIS — C50.911 CARCINOMA OF RIGHT BREAST METASTATIC TO LUNG: Primary | ICD-10-CM

## 2024-06-03 DIAGNOSIS — Z17.0 BILATERAL MALIGNANT NEOPLASM OF BREAST IN FEMALE, ESTROGEN RECEPTOR POSITIVE, UNSPECIFIED SITE OF BREAST: Primary | ICD-10-CM

## 2024-06-03 LAB
ALP BLD-CCNC: 77 U/L (ref 42–141)
BASOPHILS # BLD AUTO: 0.03 10*3/MM3 (ref 0–0.2)
BASOPHILS NFR BLD AUTO: 0.2 % (ref 0–1.5)
BUN BLDA-MCNC: 22 MG/DL (ref 7–22)
CALCIUM BLD QL: 9.8 MG/DL (ref 8–10.3)
CHLORIDE BLDA-SCNC: 104 MMOL/L (ref 98–108)
CO2 BLDA-SCNC: 25 MMOL/L (ref 18–33)
CREAT BLDA-MCNC: 1 MG/DL (ref 0.6–1.2)
DEPRECATED RDW RBC AUTO: 48.2 FL (ref 37–54)
EGFRCR SERPLBLD CKD-EPI 2021: 63.8 ML/MIN/1.73
EOSINOPHIL # BLD AUTO: 0 10*3/MM3 (ref 0–0.4)
EOSINOPHIL NFR BLD AUTO: 0 % (ref 0.3–6.2)
ERYTHROCYTE [DISTWIDTH] IN BLOOD BY AUTOMATED COUNT: 15 % (ref 12.3–15.4)
GLUCOSE BLDC GLUCOMTR-MCNC: 164 MG/DL (ref 73–118)
HCT VFR BLD AUTO: 39.9 % (ref 34–46.6)
HGB BLD-MCNC: 12.9 G/DL (ref 12–15.9)
LYMPHOCYTES # BLD AUTO: 0.85 10*3/MM3 (ref 0.7–3.1)
LYMPHOCYTES NFR BLD AUTO: 6.3 % (ref 19.6–45.3)
MCH RBC QN AUTO: 28.9 PG (ref 26.6–33)
MCHC RBC AUTO-ENTMCNC: 32.3 G/DL (ref 31.5–35.7)
MCV RBC AUTO: 89.3 FL (ref 79–97)
MONOCYTES # BLD AUTO: 0.4 10*3/MM3 (ref 0.1–0.9)
MONOCYTES NFR BLD AUTO: 3 % (ref 5–12)
NEUTROPHILS NFR BLD AUTO: 12.27 10*3/MM3 (ref 1.7–7)
NEUTROPHILS NFR BLD AUTO: 90.5 % (ref 42.7–76)
PLATELET # BLD AUTO: 232 10*3/MM3 (ref 140–450)
PMV BLD AUTO: 9.1 FL (ref 6–12)
POC ALBUMIN: 3.6 G/L (ref 3.3–5.5)
POC ALT (SGPT): 14 U/L (ref 10–47)
POC AST (SGOT): 20 U/L (ref 11–38)
POC TOTAL BILIRUBIN: 0.9 MG/DL (ref 0.2–1.6)
POC TOTAL PROTEIN: 7.3 G/DL (ref 6.4–8.1)
POTASSIUM BLDA-SCNC: 3.6 MMOL/L (ref 3.6–5.1)
RBC # BLD AUTO: 4.47 10*6/MM3 (ref 3.77–5.28)
SODIUM BLD-SCNC: 143 MMOL/L (ref 128–145)
WBC NRBC COR # BLD AUTO: 13.55 10*3/MM3 (ref 3.4–10.8)

## 2024-06-03 PROCEDURE — 80053 COMPREHEN METABOLIC PANEL: CPT

## 2024-06-03 PROCEDURE — 96417 CHEMO IV INFUS EACH ADDL SEQ: CPT

## 2024-06-03 PROCEDURE — 25810000003 SODIUM CHLORIDE 0.9 % SOLUTION 250 ML FLEX CONT: Performed by: INTERNAL MEDICINE

## 2024-06-03 PROCEDURE — 25010000002 DOCETAXEL 20 MG/ML SOLUTION 8 ML VIAL: Performed by: INTERNAL MEDICINE

## 2024-06-03 PROCEDURE — 25810000003 SODIUM CHLORIDE 0.9 % SOLUTION: Performed by: INTERNAL MEDICINE

## 2024-06-03 PROCEDURE — 25010000002 TRASTUZUMAB-ANNS 420 MG RECONSTITUTED SOLUTION 1 EACH VIAL: Performed by: INTERNAL MEDICINE

## 2024-06-03 PROCEDURE — 25010000002 HEPARIN LOCK FLUSH PER 10 UNITS: Performed by: INTERNAL MEDICINE

## 2024-06-03 PROCEDURE — 25010000002 PERTUZUMAB 420 MG/14ML SOLUTION 420 MG VIAL: Performed by: INTERNAL MEDICINE

## 2024-06-03 PROCEDURE — 96413 CHEMO IV INFUSION 1 HR: CPT

## 2024-06-03 PROCEDURE — 25010000002 ALTEPLASE 2 MG RECONSTITUTED SOLUTION: Performed by: INTERNAL MEDICINE

## 2024-06-03 PROCEDURE — 85025 COMPLETE CBC W/AUTO DIFF WBC: CPT | Performed by: INTERNAL MEDICINE

## 2024-06-03 PROCEDURE — 36593 DECLOT VASCULAR DEVICE: CPT

## 2024-06-03 RX ORDER — SODIUM CHLORIDE 9 MG/ML
20 INJECTION, SOLUTION INTRAVENOUS ONCE
Status: COMPLETED | OUTPATIENT
Start: 2024-06-03 | End: 2024-06-03

## 2024-06-03 RX ORDER — SODIUM CHLORIDE 0.9 % (FLUSH) 0.9 %
10 SYRINGE (ML) INJECTION AS NEEDED
Status: CANCELLED | OUTPATIENT
Start: 2024-06-03

## 2024-06-03 RX ORDER — DIPHENHYDRAMINE HYDROCHLORIDE 50 MG/ML
50 INJECTION INTRAMUSCULAR; INTRAVENOUS AS NEEDED
Status: CANCELLED | OUTPATIENT
Start: 2024-06-03

## 2024-06-03 RX ORDER — HEPARIN SODIUM (PORCINE) LOCK FLUSH IV SOLN 100 UNIT/ML 100 UNIT/ML
500 SOLUTION INTRAVENOUS AS NEEDED
Status: DISCONTINUED | OUTPATIENT
Start: 2024-06-03 | End: 2024-06-04 | Stop reason: HOSPADM

## 2024-06-03 RX ORDER — SODIUM CHLORIDE 0.9 % (FLUSH) 0.9 %
10 SYRINGE (ML) INJECTION AS NEEDED
Status: DISCONTINUED | OUTPATIENT
Start: 2024-06-03 | End: 2024-06-04 | Stop reason: HOSPADM

## 2024-06-03 RX ORDER — FAMOTIDINE 10 MG/ML
20 INJECTION, SOLUTION INTRAVENOUS AS NEEDED
Status: CANCELLED | OUTPATIENT
Start: 2024-06-03

## 2024-06-03 RX ORDER — SODIUM CHLORIDE 9 MG/ML
20 INJECTION, SOLUTION INTRAVENOUS ONCE
Status: CANCELLED | OUTPATIENT
Start: 2024-06-03

## 2024-06-03 RX ORDER — HEPARIN SODIUM (PORCINE) LOCK FLUSH IV SOLN 100 UNIT/ML 100 UNIT/ML
500 SOLUTION INTRAVENOUS AS NEEDED
Status: CANCELLED | OUTPATIENT
Start: 2024-06-03

## 2024-06-03 RX ADMIN — Medication 10 ML: at 13:22

## 2024-06-03 RX ADMIN — TRASTUZUMAB-ANNS 510 MG: 420 INJECTION, POWDER, LYOPHILIZED, FOR SOLUTION INTRAVENOUS at 11:34

## 2024-06-03 RX ADMIN — ALTEPLASE: 2.2 INJECTION, POWDER, LYOPHILIZED, FOR SOLUTION INTRAVENOUS at 08:42

## 2024-06-03 RX ADMIN — PERTUZUMAB 420 MG: 30 INJECTION, SOLUTION, CONCENTRATE INTRAVENOUS at 10:27

## 2024-06-03 RX ADMIN — DOCETAXEL 145 MG: 20 INJECTION, SOLUTION, CONCENTRATE INTRAVENOUS at 12:10

## 2024-06-03 RX ADMIN — HEPARIN 500 UNITS: 100 SYRINGE at 13:22

## 2024-06-03 RX ADMIN — SODIUM CHLORIDE 20 ML/HR: 9 INJECTION, SOLUTION INTRAVENOUS at 10:27

## 2024-06-03 NOTE — PROGRESS NOTES
Patient in clinic for C2 Perjeta/Kanjinti/Docetaxel.  Port accessed by Mildred Blanco RN with sterile technique and no blood return was noted. PIV was placed in left antecubital and blood was drawn and sent to lab for processing. Cathflo was instilled in the patient's port and after an hour good blood return was obtained. Cathflo was removed. Patient's labs were within normal limits. Patient tolerated treatment and was discharged with AVS.

## 2024-06-03 NOTE — PROGRESS NOTES
HEMATOLOGY ONCOLOGY OUTPATIENT FOLLOW UP       Patient name: Tracy Reddy  : 1960  MRN: [id number]  Primary Care Physician: Provider, No Known  Referring Physician: Provider, No Known  Reason For Consult: breast cancer    History of Present Illness:    Tracy Reddy is a 63 y.o. female who presented to [facility] on 4/3/2024 with complaints of cough.  She initially presented to [facility] ED with complaints of fever up to 103 °F, productive cough with green and brown sputum, shortness of air and chest tightness that has been ongoing for several days.  She reports she has family members that have been sick at home.  She reports she has also had a history of PE in the past.  Labs show troponin was 13 x 2, .8.  CBC unremarkable.  Respiratory panel was negative. Chest x-ray showed masslike opacity within the right lower lobe.  Sputum cultures and blood cultures have been ordered.  She is a non-smoker.  Pulmonology also has been consulted.     24 CT chest angiogram  Impression:  1. No evidence of pulmonary embolism.  2. No acute cardiopulmonary process. There is a mass present within the right lower lobe likely representing malignancy. There is enlarged right hilar adenopathy likely representing local metastatic disease. No previous imaging available for comparison.        24  Hematology/Oncology was consulted.       From record review: Patient has a history of bilateral ductal carcinoma diagnosed in .  Right breast IDC, G1, ER positive, ID positive, HER-2 negative. Left breast IDC, G2, ER positive, ID positive, HER-2 negative. Left SLND revealed 1 of 2 lymph nodes positive for macro metastasis. She is status post bilateral mastectomy with reconstruction in .  She is also completed neoadjuvant chemotherapy with 6 cycles of TCHP.  Taxotere and carboplatin with dose reductions at cycle 2 secondary to chemotherapy-induced nausea and vomiting.  Completed adjuvant  Herceptin Perjeta for 1 year.  Status post radiation therapy left chest wall and regional nodes, 5 fractions for a total dose of 5000 cGray. Current treatment with hormone blockade with Arimidex.  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  She follows at [facility], reviewed most recent office note from 3/11/2024.     4/5/2024 status post bronchoscopy.  EBUS guided FNA of the right lower lobe hilar mass confirmed metastatic ductal carcinoma of the breast ER +100% AR +10%, HER2 nu pending  Respiratory panel positive for human metapneumovirus.    4/12/2024 PET/CT with 1.1 cm lesion in the region of the left vallecula concerning for second primary malignancy.  Recommend correlation with direct visualization, large right hilar mass consistent with malignancy, no evidence of metastatic disease elsewhere    Path with HER 2 positive 3+ by IHC, guardant NGS with PIK3CA     5/13/24 - started THP  6/3/24 - C2       Subjective:  Continues to have some chills, body aches with chemo, takes ibuprofen, it last for a few days    Past Medical History:   Diagnosis Date    Cancer        Past Surgical History:   Procedure Laterality Date    BRONCHOSCOPY N/A 4/5/2024    Procedure: BRONCHOSCOPY WITH ENDOBRONCHIAL ULTRASOUND, bilateral bronchial washings, fine needle aspiration x 2 areas;  Surgeon: Edy Irving MD;  Location: [facility] ENDOSCOPY;  Service: Pulmonary;  Laterality: N/A;  post: lung mass    MASTECTOMY           Current Outpatient Medications:     anastrozole (ARIMIDEX) 1 MG tablet, Take 1 tablet by mouth Daily. (Patient not taking: Reported on 5/13/2024), Disp: , Rfl:     Calcium Citrate-Vitamin D (Citrus Calcium/Vitamin D) 200-6.25 MG-MCG tablet, Take  by mouth., Disp: , Rfl:     cetirizine (zyrTEC) 10 MG tablet, Take 1 tablet by mouth Daily., Disp: , Rfl:     dexAMETHasone (DECADRON) 4 MG tablet, Take 2 tablets oral twice a day for 3 consecutive days beginning the day before chemotherapy and continue  for 6 doses., Disp: 12 tablet, Rfl: 3    omeprazole (priLOSEC) 20 MG capsule, Take 1 capsule by mouth Daily., Disp: 30 capsule, Rfl: 5    ondansetron (ZOFRAN) 8 MG tablet, Take 1 tablet by mouth 3 (Three) Times a Day As Needed for Nausea or Vomiting., Disp: 30 tablet, Rfl: 5    promethazine (PHENERGAN) 12.5 MG tablet, Take 1 tablet by mouth Every 6 (Six) Hours As Needed for Nausea or Vomiting., Disp: 30 tablet, Rfl: 5  No current facility-administered medications for this visit.    Facility-Administered Medications Ordered in Other Visits:     DOCEtaxel 145 mg in sodium chloride 0.9 % 257.3 mL chemo IVPB, 75 mg/m2 (Treatment Plan Recorded), Intravenous, Once, Pippa Horn MD, Last Rate: 260 mL/hr at 06/24/24 1120, 145 mg at 06/24/24 1120    heparin injection 500 Units, 500 Units, Intravenous, PRN, Pippa Horn MD    sodium chloride 0.9 % flush 10 mL, 10 mL, Intravenous, PRN, Pippa Horn MD    Allergies   Allergen Reactions    Penicillins Rash       No family history on file.    Cancer-related family history is not on file.    Social History     Tobacco Use    Smoking status: Never     Passive exposure: Never    Smokeless tobacco: Never   Vaping Use    Vaping status: Never Used   Substance Use Topics    Alcohol use: Never    Drug use: Never     Social History     Social History Narrative    Not on file      ROS:   Review of Systems   Constitutional:  Negative for chills, fatigue and fever.   HENT:  Negative for congestion and nosebleeds.    Eyes:  Negative for pain and itching.   Respiratory:  Negative for cough and shortness of breath.    Cardiovascular:  Negative for chest pain.   Gastrointestinal:  Negative for abdominal pain, blood in stool, diarrhea, nausea and vomiting.   Endocrine: Negative for cold intolerance and heat intolerance.   Genitourinary:  Negative for difficulty urinating.   Musculoskeletal:  Negative for arthralgias.   Skin:  Negative for rash.   Neurological:  Negative for dizziness and  "headaches.   Hematological:  Does not bruise/bleed easily.   Psychiatric/Behavioral:  Negative for behavioral problems.        Objective:  Vital signs:  Vitals:    06/24/24 0830   BP: 130/80   Pulse: 97   Resp: 14   Temp: 97.8 °F (36.6 °C)   TempSrc: Oral   Weight: 83.3 kg (183 lb 10.3 oz)   Height: 162.6 cm (64\")   PainSc: 0-No pain       Body mass index is 31.52 kg/m².  ECOG  (0) Fully active, able to carry on all predisease performance without restriction    Physical Exam:   Physical Exam  Constitutional:       Appearance: Normal appearance.   HENT:      Head: Normocephalic and atraumatic.   Eyes:      Pupils: Pupils are equal, round, and reactive to light.   Cardiovascular:      Rate and Rhythm: Normal rate and regular rhythm.      Pulses: Normal pulses.      Heart sounds: Normal heart sounds. No murmur heard.  Pulmonary:      Effort: Pulmonary effort is normal.      Breath sounds: Normal breath sounds.   Abdominal:      General: There is no distension.      Palpations: Abdomen is soft. There is no mass.      Tenderness: There is no abdominal tenderness.   Musculoskeletal:         General: Normal range of motion.      Cervical back: Normal range of motion and neck supple.   Skin:     General: Skin is warm.   Neurological:      General: No focal deficit present.      Mental Status: She is alert.   Psychiatric:         Mood and Affect: Mood normal.         Lab Results - Last 18 Months   Lab Units 06/24/24  0829 06/03/24  0834 05/13/24  0835   WBC 10*3/mm3 11.87* 13.55* 12.83*   HEMOGLOBIN g/dL 12.1 12.9 13.5   HEMATOCRIT % 37.8 39.9 42.5   PLATELETS 10*3/mm3 196 232 216   MCV fL 90.4 89.3 91.2     Lab Results - Last 18 Months   Lab Units 06/24/24  0842 06/03/24  0914 05/13/24  0909 05/13/24  0835 04/05/24  0415 04/04/24  0606 04/03/24  2251   SODIUM mmol/L  --   --   --  141 140 138 134*   POTASSIUM mmol/L  --   --   --  4.1 4.2 4.0 3.9   CHLORIDE mmol/L  --   --   --  105 104 103 102   CO2 mmol/L  --   --   --  " "22.0 23.0 24.0 22.1   BUN mg/dL  --   --   --  21 11 16 19   CREATININE mg/dL 0.90 1.00 0.90 0.95 0.89 1.01* 1.05*   CALCIUM mg/dL  --   --   --  9.7 9.2 8.9 9.5   BILIRUBIN mg/dL  --   --   --  0.5  --   --  0.6   ALK PHOS U/L  --   --   --  92  --   --  80   ALT (SGPT) U/L  --   --   --  10  --   --  15   AST (SGOT) U/L  --   --   --  17  --   --  22   GLUCOSE mg/dL  --   --   --  192* 94 125* 131*       Lab Results   Component Value Date    GLUCOSE 192 (H) 05/13/2024    BUN 21 05/13/2024    CREATININE 0.90 06/24/2024    EGFRIFNONA 57 (L) 11/10/2021    EGFRIFAFRI 66 11/10/2021    BCR 22.1 05/13/2024    K 4.1 05/13/2024    CO2 22.0 05/13/2024    CALCIUM 9.7 05/13/2024    ALBUMIN 4.3 05/13/2024    AST 17 05/13/2024    ALT 10 05/13/2024       Lab Results - Last 18 Months   Lab Units 04/05/24  0415   INR  0.99       No results found for: \"IRON\", \"TIBC\", \"FERRITIN\"    No results found for: \"FOLATE\"    No results found for: \"OCCULTBLD\"    No results found for: \"RETICCTPCT\"  No results found for: \"WDMWCBUH20\"  No results found for: \"SPEP\", \"UPEP\"  No results found for: \"LDH\", \"URICACID\"  No results found for: \"BABAK\", \"RF\", \"SEDRATE\"  No results found for: \"FIBRINOGEN\", \"HAPTOGLOBIN\"  Lab Results   Component Value Date    INR 0.99 04/05/2024     No results found for: \"\"  No results found for: \"CEA\"  No components found for: \"CA-19-9\"  No results found for: \"PSA\"  No results found for: \"SEDRATE\"    Assessment & Plan     Tracy Reddy is a 63 y.o. female with history of pulmonary embolism and bilateral invasive ductal carcinoma status post bilateral mastectomy, chemotherapy and radiation treatments, on Arimidex.  She presented with dyspnea and was found to have a lung mass in the right lower lobe with right hilar and suprahilar lymphadenopathy.     Right lung mass-metastatic ductal carcinoma of the breast -recurrent  CT imaging showed mass present within the medial right lower lobe measuring up to 3.2 x 4.1 cm that " extends to the right hilar region.  Additional conglomerate of nodes present within right hilar to right suprahilar region measuring up to 2.7 x 1.8 cm.  Status post bronchoscopy EBUS 4/5/2024.    Biopsy confirms metastatic ductal carcinoma of the breast ER/ND positive, HER2 nu pending.  Further treatment will depend on HER2/nancy status.  Will also send tumor sample out for [employer] NGS testing.  She is on Arimidex right now but will need to switch to an alternate AI or a different agent given mutational analysis findings.  If HER2 positive disease, will use HER2 blockade.  Given there is no other clear areas of metastasis.  We can still consider curative treatment with either radio-ablation or surgical resection.  Discussed at tumor board. Not a surgical candidate based on tumor location. Radiation possible. Will plan on starting chemo with herceptin perjeta and then consider radiation after 6 cycles.  NGS testing with guardant showing PIK3CA  Will then continue herceptin perjeta maintenance  Started treatment tolerating well has some side effects as above not significant to need any dose modification  Follow-up in 3 weeks repeat imaging prior to assess for response    Insomnia  - complains more with steroids  Decrease steroids to 2 days only.  Melatonin      History of bilateral invasive ductal carcinoma  -Diagnosed in 2020. She is status post bilateral mastectomy with reconstruction in 2021.  She is also completed neoadjuvant chemotherapy with 6 cycles of TCHP.  Taxotere and carboplatin with dose reductions at cycle 2 secondary to chemotherapy-induced nausea and vomiting.  There is some confusion about her pathology as some reports show HER2 negative, some show positive.  However she did receive adjuvant Herceptin Perjeta maintenance which likely points towards HER2 positive disease.  She is also known to have CHEK2 mutation.  Completed adjuvant Herceptin Perjeta for 1 year.  Status post radiation therapy left chest  wall and regional nodes, 5 fractions for a total dose of 5000 cGray. Current treatment with hormone blockade with Arimidex. She follows at [facility] but will transfer care to us now     Pneumonia  -Respiratory panel positive for human metapneumovirus.  Resolved symptoms now  No recurrence    Osteopenia  Patient has been on Zometa,  DEXA with osteopenia. continue calcium vitamin D.    Rash  Likely herceptin related  Lasted for a week   Recommend hydrocortisone topical   Rash has improved      Repeat scans follow-up after scans with next treatment

## 2024-06-06 ENCOUNTER — HOSPITAL ENCOUNTER (OUTPATIENT)
Dept: ONCOLOGY | Facility: HOSPITAL | Age: 64
Discharge: HOME | End: 2024-06-06
Payer: COMMERCIAL

## 2024-06-06 VITALS
SYSTOLIC BLOOD PRESSURE: 109 MMHG | DIASTOLIC BLOOD PRESSURE: 73 MMHG | HEART RATE: 90 BPM | TEMPERATURE: 97.6 F | OXYGEN SATURATION: 95 %

## 2024-06-06 DIAGNOSIS — E86.0 DEHYDRATION: Primary | ICD-10-CM

## 2024-06-06 DIAGNOSIS — R91.8 LUNG MASS: ICD-10-CM

## 2024-06-06 PROCEDURE — 25810000003 SODIUM CHLORIDE 0.9 % SOLUTION: Performed by: INTERNAL MEDICINE

## 2024-06-06 PROCEDURE — 25010000002 ONDANSETRON PER 1 MG: Performed by: INTERNAL MEDICINE

## 2024-06-06 PROCEDURE — 25010000002 HEPARIN LOCK FLUSH PER 10 UNITS: Performed by: INTERNAL MEDICINE

## 2024-06-06 PROCEDURE — 96374 THER/PROPH/DIAG INJ IV PUSH: CPT

## 2024-06-06 PROCEDURE — 96361 HYDRATE IV INFUSION ADD-ON: CPT

## 2024-06-06 RX ORDER — ONDANSETRON 2 MG/ML
8 INJECTION INTRAMUSCULAR; INTRAVENOUS ONCE
Status: COMPLETED | OUTPATIENT
Start: 2024-06-06 | End: 2024-06-06

## 2024-06-06 RX ORDER — HEPARIN SODIUM (PORCINE) LOCK FLUSH IV SOLN 100 UNIT/ML 100 UNIT/ML
500 SOLUTION INTRAVENOUS AS NEEDED
OUTPATIENT
Start: 2024-06-06

## 2024-06-06 RX ORDER — HEPARIN SODIUM (PORCINE) LOCK FLUSH IV SOLN 100 UNIT/ML 100 UNIT/ML
500 SOLUTION INTRAVENOUS AS NEEDED
Status: DISCONTINUED | OUTPATIENT
Start: 2024-06-06 | End: 2024-06-07 | Stop reason: HOSPADM

## 2024-06-06 RX ORDER — SODIUM CHLORIDE 0.9 % (FLUSH) 0.9 %
10 SYRINGE (ML) INJECTION AS NEEDED
OUTPATIENT
Start: 2024-06-06

## 2024-06-06 RX ORDER — SODIUM CHLORIDE 0.9 % (FLUSH) 0.9 %
10 SYRINGE (ML) INJECTION AS NEEDED
Status: DISCONTINUED | OUTPATIENT
Start: 2024-06-06 | End: 2024-06-07 | Stop reason: HOSPADM

## 2024-06-06 RX ADMIN — ONDANSETRON HYDROCHLORIDE 8 MG: 2 INJECTION, SOLUTION INTRAMUSCULAR; INTRAVENOUS at 13:48

## 2024-06-06 RX ADMIN — HEPARIN 500 UNITS: 100 SYRINGE at 14:54

## 2024-06-06 RX ADMIN — Medication 10 ML: at 14:54

## 2024-06-06 RX ADMIN — SODIUM CHLORIDE 1000 ML: 9 INJECTION, SOLUTION INTRAVENOUS at 13:42

## 2024-06-24 ENCOUNTER — HOSPITAL ENCOUNTER (OUTPATIENT)
Dept: ONCOLOGY | Facility: HOSPITAL | Age: 64
Discharge: HOME | End: 2024-06-24
Admitting: INTERNAL MEDICINE
Payer: COMMERCIAL

## 2024-06-24 ENCOUNTER — OFFICE VISIT (OUTPATIENT)
Dept: ONCOLOGY | Facility: CLINIC | Age: 64
End: 2024-06-24
Payer: COMMERCIAL

## 2024-06-24 VITALS
SYSTOLIC BLOOD PRESSURE: 130 MMHG | HEART RATE: 97 BPM | HEIGHT: 64 IN | DIASTOLIC BLOOD PRESSURE: 80 MMHG | BODY MASS INDEX: 31.35 KG/M2 | RESPIRATION RATE: 14 BRPM | WEIGHT: 183.64 LBS | TEMPERATURE: 97.8 F

## 2024-06-24 VITALS
OXYGEN SATURATION: 95 % | HEIGHT: 64 IN | TEMPERATURE: 97.8 F | BODY MASS INDEX: 31.34 KG/M2 | DIASTOLIC BLOOD PRESSURE: 80 MMHG | RESPIRATION RATE: 14 BRPM | WEIGHT: 183.6 LBS | HEART RATE: 97 BPM | SYSTOLIC BLOOD PRESSURE: 130 MMHG

## 2024-06-24 DIAGNOSIS — C50.912 BILATERAL MALIGNANT NEOPLASM OF BREAST IN FEMALE, ESTROGEN RECEPTOR POSITIVE, UNSPECIFIED SITE OF BREAST: Primary | ICD-10-CM

## 2024-06-24 DIAGNOSIS — Z17.0 BILATERAL MALIGNANT NEOPLASM OF BREAST IN FEMALE, ESTROGEN RECEPTOR POSITIVE, UNSPECIFIED SITE OF BREAST: Primary | ICD-10-CM

## 2024-06-24 DIAGNOSIS — R91.8 LUNG MASS: ICD-10-CM

## 2024-06-24 DIAGNOSIS — C50.911 BILATERAL MALIGNANT NEOPLASM OF BREAST IN FEMALE, ESTROGEN RECEPTOR POSITIVE, UNSPECIFIED SITE OF BREAST: Primary | ICD-10-CM

## 2024-06-24 LAB
ALP BLD-CCNC: 71 U/L (ref 42–141)
BASOPHILS # BLD AUTO: 0.02 10*3/MM3 (ref 0–0.2)
BASOPHILS NFR BLD AUTO: 0.2 % (ref 0–1.5)
BUN BLDA-MCNC: 19 MG/DL (ref 7–22)
CALCIUM BLD QL: 10 MG/DL (ref 8–10.3)
CHLORIDE BLDA-SCNC: 108 MMOL/L (ref 98–108)
CO2 BLDA-SCNC: 25 MMOL/L (ref 18–33)
CREAT BLDA-MCNC: 0.9 MG/DL (ref 0.6–1.2)
DEPRECATED RDW RBC AUTO: 52.1 FL (ref 37–54)
EGFRCR SERPLBLD CKD-EPI 2021: 72.4 ML/MIN/1.73
EOSINOPHIL # BLD AUTO: 0 10*3/MM3 (ref 0–0.4)
EOSINOPHIL NFR BLD AUTO: 0 % (ref 0.3–6.2)
ERYTHROCYTE [DISTWIDTH] IN BLOOD BY AUTOMATED COUNT: 16.5 % (ref 12.3–15.4)
GLUCOSE BLDC GLUCOMTR-MCNC: 160 MG/DL (ref 73–118)
HCT VFR BLD AUTO: 37.8 % (ref 34–46.6)
HGB BLD-MCNC: 12.1 G/DL (ref 12–15.9)
LYMPHOCYTES # BLD AUTO: 0.88 10*3/MM3 (ref 0.7–3.1)
LYMPHOCYTES NFR BLD AUTO: 7.4 % (ref 19.6–45.3)
MCH RBC QN AUTO: 28.9 PG (ref 26.6–33)
MCHC RBC AUTO-ENTMCNC: 32 G/DL (ref 31.5–35.7)
MCV RBC AUTO: 90.4 FL (ref 79–97)
MONOCYTES # BLD AUTO: 0.4 10*3/MM3 (ref 0.1–0.9)
MONOCYTES NFR BLD AUTO: 3.4 % (ref 5–12)
NEUTROPHILS NFR BLD AUTO: 10.57 10*3/MM3 (ref 1.7–7)
NEUTROPHILS NFR BLD AUTO: 89 % (ref 42.7–76)
PLATELET # BLD AUTO: 196 10*3/MM3 (ref 140–450)
PMV BLD AUTO: 9.5 FL (ref 6–12)
POC ALBUMIN: 3.6 G/L (ref 3.3–5.5)
POC ALT (SGPT): 15 U/L (ref 10–47)
POC AST (SGOT): 23 U/L (ref 11–38)
POC TOTAL BILIRUBIN: 1.1 MG/DL (ref 0.2–1.6)
POC TOTAL PROTEIN: 7.3 G/DL (ref 6.4–8.1)
POTASSIUM BLDA-SCNC: 4 MMOL/L (ref 3.6–5.1)
RBC # BLD AUTO: 4.18 10*6/MM3 (ref 3.77–5.28)
SODIUM BLD-SCNC: 147 MMOL/L (ref 128–145)
WBC NRBC COR # BLD AUTO: 11.87 10*3/MM3 (ref 3.4–10.8)

## 2024-06-24 PROCEDURE — 25810000003 SODIUM CHLORIDE 0.9 % SOLUTION 250 ML FLEX CONT: Performed by: INTERNAL MEDICINE

## 2024-06-24 PROCEDURE — 96417 CHEMO IV INFUS EACH ADDL SEQ: CPT

## 2024-06-24 PROCEDURE — 25810000003 SODIUM CHLORIDE 0.9 % SOLUTION: Performed by: INTERNAL MEDICINE

## 2024-06-24 PROCEDURE — 25010000002 DOCETAXEL 20 MG/ML SOLUTION 8 ML VIAL: Performed by: INTERNAL MEDICINE

## 2024-06-24 PROCEDURE — 25010000002 TRASTUZUMAB-ANNS 420 MG RECONSTITUTED SOLUTION 1 EACH VIAL: Performed by: INTERNAL MEDICINE

## 2024-06-24 PROCEDURE — 99214 OFFICE O/P EST MOD 30 MIN: CPT | Performed by: INTERNAL MEDICINE

## 2024-06-24 PROCEDURE — 25010000002 HEPARIN LOCK FLUSH PER 10 UNITS: Performed by: INTERNAL MEDICINE

## 2024-06-24 PROCEDURE — 80053 COMPREHEN METABOLIC PANEL: CPT

## 2024-06-24 PROCEDURE — 25010000002 PERTUZUMAB 420 MG/14ML SOLUTION 420 MG VIAL: Performed by: INTERNAL MEDICINE

## 2024-06-24 PROCEDURE — 96413 CHEMO IV INFUSION 1 HR: CPT

## 2024-06-24 PROCEDURE — 85025 COMPLETE CBC W/AUTO DIFF WBC: CPT | Performed by: INTERNAL MEDICINE

## 2024-06-24 RX ORDER — HEPARIN SODIUM (PORCINE) LOCK FLUSH IV SOLN 100 UNIT/ML 100 UNIT/ML
500 SOLUTION INTRAVENOUS AS NEEDED
Status: CANCELLED | OUTPATIENT
Start: 2024-06-24

## 2024-06-24 RX ORDER — DIPHENHYDRAMINE HYDROCHLORIDE 50 MG/ML
50 INJECTION INTRAMUSCULAR; INTRAVENOUS AS NEEDED
Status: CANCELLED | OUTPATIENT
Start: 2024-06-24

## 2024-06-24 RX ORDER — SODIUM CHLORIDE 9 MG/ML
20 INJECTION, SOLUTION INTRAVENOUS ONCE
Status: COMPLETED | OUTPATIENT
Start: 2024-06-24 | End: 2024-06-24

## 2024-06-24 RX ORDER — FAMOTIDINE 10 MG/ML
20 INJECTION, SOLUTION INTRAVENOUS AS NEEDED
Status: CANCELLED | OUTPATIENT
Start: 2024-06-24

## 2024-06-24 RX ORDER — HEPARIN SODIUM (PORCINE) LOCK FLUSH IV SOLN 100 UNIT/ML 100 UNIT/ML
500 SOLUTION INTRAVENOUS AS NEEDED
Status: DISCONTINUED | OUTPATIENT
Start: 2024-06-24 | End: 2024-06-25 | Stop reason: HOSPADM

## 2024-06-24 RX ORDER — SODIUM CHLORIDE 0.9 % (FLUSH) 0.9 %
10 SYRINGE (ML) INJECTION AS NEEDED
Status: CANCELLED | OUTPATIENT
Start: 2024-06-24

## 2024-06-24 RX ORDER — SODIUM CHLORIDE 0.9 % (FLUSH) 0.9 %
10 SYRINGE (ML) INJECTION AS NEEDED
Status: DISCONTINUED | OUTPATIENT
Start: 2024-06-24 | End: 2024-06-25 | Stop reason: HOSPADM

## 2024-06-24 RX ORDER — SODIUM CHLORIDE 9 MG/ML
20 INJECTION, SOLUTION INTRAVENOUS ONCE
Status: CANCELLED | OUTPATIENT
Start: 2024-06-24

## 2024-06-24 RX ADMIN — Medication 10 ML: at 12:27

## 2024-06-24 RX ADMIN — TRASTUZUMAB-ANNS 510 MG: 420 INJECTION, POWDER, LYOPHILIZED, FOR SOLUTION INTRAVENOUS at 10:43

## 2024-06-24 RX ADMIN — HEPARIN 500 UNITS: 100 SYRINGE at 12:27

## 2024-06-24 RX ADMIN — DOCETAXEL 145 MG: 20 INJECTION, SOLUTION, CONCENTRATE INTRAVENOUS at 11:20

## 2024-06-24 RX ADMIN — PERTUZUMAB 420 MG: 30 INJECTION, SOLUTION, CONCENTRATE INTRAVENOUS at 09:33

## 2024-06-24 RX ADMIN — SODIUM CHLORIDE 20 ML/HR: 9 INJECTION, SOLUTION INTRAVENOUS at 09:33

## 2024-06-26 LAB
BEAKER LAB AP INTRAOPERATIVE CONSULTATION: NORMAL
LAB AP CARIS, ADDENDUM: NORMAL
LAB AP CASE REPORT: NORMAL
LAB AP DIAGNOSIS COMMENT: NORMAL
PATH REPORT.ADDENDUM SPEC: NORMAL
PATH REPORT.FINAL DX SPEC: NORMAL
PATH REPORT.GROSS SPEC: NORMAL

## 2024-06-27 ENCOUNTER — HOSPITAL ENCOUNTER (OUTPATIENT)
Dept: ONCOLOGY | Facility: HOSPITAL | Age: 64
Discharge: HOME | End: 2024-06-27
Payer: COMMERCIAL

## 2024-06-27 VITALS
SYSTOLIC BLOOD PRESSURE: 105 MMHG | HEIGHT: 64 IN | BODY MASS INDEX: 31.04 KG/M2 | OXYGEN SATURATION: 95 % | DIASTOLIC BLOOD PRESSURE: 68 MMHG | RESPIRATION RATE: 16 BRPM | HEART RATE: 114 BPM | WEIGHT: 181.8 LBS

## 2024-06-27 DIAGNOSIS — E86.0 DEHYDRATION: Primary | ICD-10-CM

## 2024-06-27 DIAGNOSIS — R91.8 LUNG MASS: ICD-10-CM

## 2024-06-27 PROCEDURE — 25010000002 ONDANSETRON PER 1 MG: Performed by: INTERNAL MEDICINE

## 2024-06-27 PROCEDURE — 25810000003 SODIUM CHLORIDE 0.9 % SOLUTION: Performed by: INTERNAL MEDICINE

## 2024-06-27 PROCEDURE — 96374 THER/PROPH/DIAG INJ IV PUSH: CPT

## 2024-06-27 PROCEDURE — 96361 HYDRATE IV INFUSION ADD-ON: CPT

## 2024-06-27 PROCEDURE — 25010000002 HEPARIN LOCK FLUSH PER 10 UNITS: Performed by: INTERNAL MEDICINE

## 2024-06-27 RX ORDER — HEPARIN SODIUM (PORCINE) LOCK FLUSH IV SOLN 100 UNIT/ML 100 UNIT/ML
500 SOLUTION INTRAVENOUS AS NEEDED
Status: DISCONTINUED | OUTPATIENT
Start: 2024-06-27 | End: 2024-06-29 | Stop reason: HOSPADM

## 2024-06-27 RX ORDER — SODIUM CHLORIDE 0.9 % (FLUSH) 0.9 %
10 SYRINGE (ML) INJECTION AS NEEDED
OUTPATIENT
Start: 2024-06-27

## 2024-06-27 RX ORDER — SODIUM CHLORIDE 0.9 % (FLUSH) 0.9 %
10 SYRINGE (ML) INJECTION AS NEEDED
Status: DISCONTINUED | OUTPATIENT
Start: 2024-06-27 | End: 2024-06-29 | Stop reason: HOSPADM

## 2024-06-27 RX ORDER — HEPARIN SODIUM (PORCINE) LOCK FLUSH IV SOLN 100 UNIT/ML 100 UNIT/ML
500 SOLUTION INTRAVENOUS AS NEEDED
OUTPATIENT
Start: 2024-06-27

## 2024-06-27 RX ORDER — ONDANSETRON 2 MG/ML
8 INJECTION INTRAMUSCULAR; INTRAVENOUS ONCE
Status: COMPLETED | OUTPATIENT
Start: 2024-06-27 | End: 2024-06-27

## 2024-06-27 RX ADMIN — ONDANSETRON HYDROCHLORIDE 8 MG: 2 INJECTION, SOLUTION INTRAMUSCULAR; INTRAVENOUS at 13:42

## 2024-06-27 RX ADMIN — HEPARIN 500 UNITS: 100 SYRINGE at 15:01

## 2024-06-27 RX ADMIN — Medication 10 ML: at 15:01

## 2024-06-27 RX ADMIN — SODIUM CHLORIDE 1000 ML: 9 INJECTION, SOLUTION INTRAVENOUS at 13:38
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
## 2024-07-10 ENCOUNTER — HOSPITAL ENCOUNTER (OUTPATIENT)
Dept: PET IMAGING | Facility: HOSPITAL | Age: 64
Discharge: HOME | End: 2024-07-10
Admitting: INTERNAL MEDICINE
Payer: COMMERCIAL

## 2024-07-10 DIAGNOSIS — Z17.0 BILATERAL MALIGNANT NEOPLASM OF BREAST IN FEMALE, ESTROGEN RECEPTOR POSITIVE, UNSPECIFIED SITE OF BREAST: ICD-10-CM

## 2024-07-10 DIAGNOSIS — C50.911 BILATERAL MALIGNANT NEOPLASM OF BREAST IN FEMALE, ESTROGEN RECEPTOR POSITIVE, UNSPECIFIED SITE OF BREAST: ICD-10-CM

## 2024-07-10 DIAGNOSIS — C50.912 BILATERAL MALIGNANT NEOPLASM OF BREAST IN FEMALE, ESTROGEN RECEPTOR POSITIVE, UNSPECIFIED SITE OF BREAST: ICD-10-CM

## 2024-07-10 DIAGNOSIS — R91.8 LUNG MASS: ICD-10-CM

## 2024-07-10 PROCEDURE — 71250 CT THORAX DX C-: CPT

## 2024-07-12 NOTE — PROGRESS NOTES
HEMATOLOGY ONCOLOGY OUTPATIENT FOLLOW UP       Patient name: Tracy Reddy  : 1960  MRN: [id number]  Primary Care Physician: Provider, No Known  Referring Physician: No ref. provider found  Reason For Consult: breast cancer    History of Present Illness:    Tracy Reddy is a 63 y.o. female who presented to [facility] on 4/3/2024 with complaints of cough.  She initially presented to [facility] ED with complaints of fever up to 103 °F, productive cough with green and brown sputum, shortness of air and chest tightness that has been ongoing for several days.  She reports she has family members that have been sick at home.  She reports she has also had a history of PE in the past.  Labs show troponin was 13 x 2, .8.  CBC unremarkable.  Respiratory panel was negative. Chest x-ray showed masslike opacity within the right lower lobe.  Sputum cultures and blood cultures have been ordered.  She is a non-smoker.  Pulmonology also has been consulted.     24 CT chest angiogram  Impression:  1. No evidence of pulmonary embolism.  2. No acute cardiopulmonary process. There is a mass present within the right lower lobe likely representing malignancy. There is enlarged right hilar adenopathy likely representing local metastatic disease. No previous imaging available for comparison.        24  Hematology/Oncology was consulted.       From record review: Patient has a history of bilateral ductal carcinoma diagnosed in .  Right breast IDC, G1, ER positive, ME positive, HER-2 negative. Left breast IDC, G2, ER positive, ME positive, HER-2 negative. Left SLND revealed 1 of 2 lymph nodes positive for macro metastasis. She is status post bilateral mastectomy with reconstruction in .  She is also completed neoadjuvant chemotherapy with 6 cycles of TCHP.  Taxotere and carboplatin with dose reductions at cycle 2 secondary to chemotherapy-induced nausea and vomiting.  Completed  adjuvant Herceptin Perjeta for 1 year.  Status post radiation therapy left chest wall and regional nodes, 5 fractions for a total dose of 5000 cGray. Current treatment with hormone blockade with Arimidex.  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  She follows at [facility], reviewed most recent office note from 3/11/2024.     4/5/2024 status post bronchoscopy.  EBUS guided FNA of the right lower lobe hilar mass confirmed metastatic ductal carcinoma of the breast ER +100% TX +10%, HER2 nu pending  Respiratory panel positive for human metapneumovirus.    4/12/2024 PET/CT with 1.1 cm lesion in the region of the left vallecula concerning for second primary malignancy.  Recommend correlation with direct visualization, large right hilar mass consistent with malignancy, no evidence of metastatic disease elsewhere    Path with HER 2 positive 3+ by IHC, guardant NGS with PIK3CA     5/13/24 - started THP  6/3/24 - C2  6/24/24 - C3    7/12/24 - CT imaging - There is decrease in size of the right hilar mass compared to the prior examination. This now measures approximately 3.0 x 1.5 cm, previously measured 4.2 x 3.4 cm using similar measurement technique.      Subjective:  Continues to tolerate treatment well, has had some rash.    Past Medical History:   Diagnosis Date    Cancer        Past Surgical History:   Procedure Laterality Date    BRONCHOSCOPY N/A 4/5/2024    Procedure: BRONCHOSCOPY WITH ENDOBRONCHIAL ULTRASOUND, bilateral bronchial washings, fine needle aspiration x 2 areas;  Surgeon: Edy Irving MD;  Location: [facility] ENDOSCOPY;  Service: Pulmonary;  Laterality: N/A;  post: lung mass    MASTECTOMY           Current Outpatient Medications:     Calcium Citrate-Vitamin D (Citrus Calcium/Vitamin D) 200-6.25 MG-MCG tablet, Take  by mouth., Disp: , Rfl:     cetirizine (zyrTEC) 10 MG tablet, Take 1 tablet by mouth Daily., Disp: , Rfl:     dexAMETHasone (DECADRON) 4 MG tablet, Take 2 tablets oral  "twice a day for 3 consecutive days beginning the day before chemotherapy and continue for 6 doses., Disp: 12 tablet, Rfl: 3    omeprazole (priLOSEC) 20 MG capsule, Take 1 capsule by mouth Daily., Disp: 30 capsule, Rfl: 5    ondansetron (ZOFRAN) 8 MG tablet, Take 1 tablet by mouth 3 (Three) Times a Day As Needed for Nausea or Vomiting., Disp: 30 tablet, Rfl: 5    promethazine (PHENERGAN) 12.5 MG tablet, Take 1 tablet by mouth Every 6 (Six) Hours As Needed for Nausea or Vomiting., Disp: 30 tablet, Rfl: 5    Allergies   Allergen Reactions    Penicillins Rash       No family history on file.    Cancer-related family history is not on file.    Social History     Tobacco Use    Smoking status: Never     Passive exposure: Never    Smokeless tobacco: Never   Vaping Use    Vaping status: Never Used   Substance Use Topics    Alcohol use: Never    Drug use: Never     Social History     Social History Narrative    Not on file      ROS:   Review of Systems   Constitutional:  Negative for chills, fatigue and fever.   HENT:  Negative for congestion and nosebleeds.    Eyes:  Negative for pain and itching.   Respiratory:  Negative for cough and shortness of breath.    Cardiovascular:  Negative for chest pain.   Gastrointestinal:  Negative for abdominal pain, blood in stool, diarrhea, nausea and vomiting.   Endocrine: Negative for cold intolerance and heat intolerance.   Genitourinary:  Negative for difficulty urinating.   Musculoskeletal:  Negative for arthralgias.   Skin:  Negative for rash.   Neurological:  Negative for dizziness and headaches.   Hematological:  Does not bruise/bleed easily.   Psychiatric/Behavioral:  Negative for behavioral problems.        Objective:  Vital signs:  Vitals:    07/15/24 0910   BP: 112/76   Pulse: 95   Resp: 14   Temp: 98.2 °F (36.8 °C)   SpO2: 96%   Weight: 83 kg (183 lb)   Height: 162.6 cm (64\")   PainSc: 0-No pain         Body mass index is 31.41 kg/m².  ECOG  (0) Fully active, able to carry on " all predisease performance without restriction    Physical Exam:   Physical Exam  Constitutional:       Appearance: Normal appearance.   HENT:      Head: Normocephalic and atraumatic.   Eyes:      Pupils: Pupils are equal, round, and reactive to light.   Cardiovascular:      Rate and Rhythm: Normal rate and regular rhythm.      Pulses: Normal pulses.      Heart sounds: Normal heart sounds. No murmur heard.  Pulmonary:      Effort: Pulmonary effort is normal.      Breath sounds: Normal breath sounds.   Abdominal:      General: There is no distension.      Palpations: Abdomen is soft. There is no mass.      Tenderness: There is no abdominal tenderness.   Musculoskeletal:         General: Normal range of motion.      Cervical back: Normal range of motion and neck supple.   Skin:     General: Skin is warm.   Neurological:      General: No focal deficit present.      Mental Status: She is alert.   Psychiatric:         Mood and Affect: Mood normal.         Lab Results - Last 18 Months   Lab Units 07/15/24  0841 06/24/24  0829 06/03/24  0834   WBC 10*3/mm3 12.04* 11.87* 13.55*   HEMOGLOBIN g/dL 12.7 12.1 12.9   HEMATOCRIT % 39.7 37.8 39.9   PLATELETS 10*3/mm3 228 196 232   MCV fL 89.2 90.4 89.3     Lab Results - Last 18 Months   Lab Units 06/24/24  0842 06/03/24  0914 05/13/24  0909 05/13/24  0835 04/05/24  0415 04/04/24  0606 04/03/24  2251   SODIUM mmol/L  --   --   --  141 140 138 134*   POTASSIUM mmol/L  --   --   --  4.1 4.2 4.0 3.9   CHLORIDE mmol/L  --   --   --  105 104 103 102   CO2 mmol/L  --   --   --  22.0 23.0 24.0 22.1   BUN mg/dL  --   --   --  21 11 16 19   CREATININE mg/dL 0.90 1.00 0.90 0.95 0.89 1.01* 1.05*   CALCIUM mg/dL  --   --   --  9.7 9.2 8.9 9.5   BILIRUBIN mg/dL  --   --   --  0.5  --   --  0.6   ALK PHOS U/L  --   --   --  92  --   --  80   ALT (SGPT) U/L  --   --   --  10  --   --  15   AST (SGOT) U/L  --   --   --  17  --   --  22   GLUCOSE mg/dL  --   --   --  192* 94 125* 131*       Lab  "Results   Component Value Date    GLUCOSE 192 (H) 05/13/2024    BUN 21 05/13/2024    CREATININE 0.90 06/24/2024    EGFRIFNONA 57 (L) 11/10/2021    EGFRIFAFRI 66 11/10/2021    BCR 22.1 05/13/2024    K 4.1 05/13/2024    CO2 22.0 05/13/2024    CALCIUM 9.7 05/13/2024    ALBUMIN 4.3 05/13/2024    AST 17 05/13/2024    ALT 10 05/13/2024       Lab Results - Last 18 Months   Lab Units 04/05/24  0415   INR  0.99       No results found for: \"IRON\", \"TIBC\", \"FERRITIN\"    No results found for: \"FOLATE\"    No results found for: \"OCCULTBLD\"    No results found for: \"RETICCTPCT\"  No results found for: \"VQPPTJLU90\"  No results found for: \"SPEP\", \"UPEP\"  No results found for: \"LDH\", \"URICACID\"  No results found for: \"BABAK\", \"RF\", \"SEDRATE\"  No results found for: \"FIBRINOGEN\", \"HAPTOGLOBIN\"  Lab Results   Component Value Date    INR 0.99 04/05/2024     No results found for: \"\"  No results found for: \"CEA\"  No components found for: \"CA-19-9\"  No results found for: \"PSA\"  No results found for: \"SEDRATE\"    Assessment & Plan     Tracy Reddy is a 63 y.o. female with history of pulmonary embolism and bilateral invasive ductal carcinoma status post bilateral mastectomy, chemotherapy and radiation treatments, on Arimidex.  She presented with dyspnea and was found to have a lung mass in the right lower lobe with right hilar and suprahilar lymphadenopathy.     Right lung mass-metastatic ductal carcinoma of the breast -recurrent  CT imaging showed mass present within the medial right lower lobe measuring up to 3.2 x 4.1 cm that extends to the right hilar region.  Additional conglomerate of nodes present within right hilar to right suprahilar region measuring up to 2.7 x 1.8 cm.  Status post bronchoscopy EBUS 4/5/2024.    Biopsy confirms metastatic ductal carcinoma of the breast ER/PA positive, HER2 nu pending.  Further treatment will depend on HER2/nancy status.  Will also send tumor sample out for [facility] NGS testing.  She is on Arimidex " right now but will need to switch to an alternate AI or a different agent given mutational analysis findings.  If HER2 positive disease, will use HER2 blockade.  Given there is no other clear areas of metastasis.  We can still consider curative treatment with either radio-ablation or surgical resection.  Discussed at tumor board. Not a surgical candidate based on tumor location. Radiation possible. Will plan on starting chemo with herceptin perjeta and then consider radiation after 6 cycles.  NGS testing with guardant showing PIK3CA  Will then continue herceptin perjeta maintenance  Started treatment tolerating well has some side effects as above not significant to need any dose modification  CT imaging now with good response. Reviewed images independently and showed the patient. Reviewed labs, and notes from other providers.ordered labs  Our plan would be to continue 3 more cycles and then discuss at tumor board again about consolidation with surgery vs radiation. Initial thought was that radiation would be preferable given location.  Continue treatment no changes.  Resume AI after chemotherapy can start letrozole at that point.    Insomnia  - complains more with steroids  Takes melatonin      History of bilateral invasive ductal carcinoma  -Diagnosed in 2020. She is status post bilateral mastectomy with reconstruction in 2021.  She is also completed neoadjuvant chemotherapy with 6 cycles of TCHP.  Taxotere and carboplatin with dose reductions at cycle 2 secondary to chemotherapy-induced nausea and vomiting.  There is some confusion about her pathology as some reports show HER2 negative, some show positive.  However she did receive adjuvant Herceptin Perjeta maintenance which likely points towards HER2 positive disease.  She is also known to have CHEK2 mutation.  Completed adjuvant Herceptin Perjeta for 1 year.  Status post radiation therapy left chest wall and regional nodes, 5 fractions for a total dose of 5000  cGray. Current treatment with hormone blockade with Arimidex. She followed at [facility] has transferred care to us now     Pneumonia  Resolved  Lingering cough now    Osteopenia  DEXA with osteopenia. continue calcium vitamin D.    Rash  Likely herceptin related  Lasted for a week   Recommend hydrocortisone topical   Rash has improved    Diarrhea   mild    Continue treatment  On chemotherapy requiring intensive monitoring.  F/u Dr. Nazario in 3 weeks.

## 2024-07-15 ENCOUNTER — HOSPITAL ENCOUNTER (OUTPATIENT)
Dept: ONCOLOGY | Facility: HOSPITAL | Age: 64
Discharge: HOME | End: 2024-07-15
Payer: COMMERCIAL

## 2024-07-15 ENCOUNTER — OFFICE VISIT (OUTPATIENT)
Dept: ONCOLOGY | Facility: CLINIC | Age: 64
End: 2024-07-15
Payer: COMMERCIAL

## 2024-07-15 VITALS
DIASTOLIC BLOOD PRESSURE: 76 MMHG | BODY MASS INDEX: 31.24 KG/M2 | HEIGHT: 64 IN | OXYGEN SATURATION: 96 % | WEIGHT: 183 LBS | TEMPERATURE: 98.2 F | RESPIRATION RATE: 14 BRPM | SYSTOLIC BLOOD PRESSURE: 112 MMHG | HEART RATE: 95 BPM

## 2024-07-15 DIAGNOSIS — C50.911 BILATERAL MALIGNANT NEOPLASM OF BREAST IN FEMALE, ESTROGEN RECEPTOR POSITIVE, UNSPECIFIED SITE OF BREAST: ICD-10-CM

## 2024-07-15 DIAGNOSIS — C50.912 BILATERAL MALIGNANT NEOPLASM OF BREAST IN FEMALE, ESTROGEN RECEPTOR POSITIVE, UNSPECIFIED SITE OF BREAST: ICD-10-CM

## 2024-07-15 DIAGNOSIS — C50.911 CARCINOMA OF RIGHT BREAST METASTATIC TO LUNG: Primary | ICD-10-CM

## 2024-07-15 DIAGNOSIS — C78.00 CARCINOMA OF BREAST METASTATIC TO LUNG, UNSPECIFIED LATERALITY: ICD-10-CM

## 2024-07-15 DIAGNOSIS — C50.919 CARCINOMA OF BREAST METASTATIC TO LUNG, UNSPECIFIED LATERALITY: ICD-10-CM

## 2024-07-15 DIAGNOSIS — C78.00 CARCINOMA OF RIGHT BREAST METASTATIC TO LUNG: Primary | ICD-10-CM

## 2024-07-15 DIAGNOSIS — C50.911 BILATERAL MALIGNANT NEOPLASM OF BREAST IN FEMALE, ESTROGEN RECEPTOR POSITIVE, UNSPECIFIED SITE OF BREAST: Primary | ICD-10-CM

## 2024-07-15 DIAGNOSIS — C50.912 BILATERAL MALIGNANT NEOPLASM OF BREAST IN FEMALE, ESTROGEN RECEPTOR POSITIVE, UNSPECIFIED SITE OF BREAST: Primary | ICD-10-CM

## 2024-07-15 DIAGNOSIS — Z17.0 BILATERAL MALIGNANT NEOPLASM OF BREAST IN FEMALE, ESTROGEN RECEPTOR POSITIVE, UNSPECIFIED SITE OF BREAST: Primary | ICD-10-CM

## 2024-07-15 DIAGNOSIS — Z17.0 BILATERAL MALIGNANT NEOPLASM OF BREAST IN FEMALE, ESTROGEN RECEPTOR POSITIVE, UNSPECIFIED SITE OF BREAST: ICD-10-CM

## 2024-07-15 DIAGNOSIS — R91.8 LUNG MASS: ICD-10-CM

## 2024-07-15 LAB
ALP BLD-CCNC: 82 U/L (ref 42–141)
BASOPHILS # BLD AUTO: 0.02 10*3/MM3 (ref 0–0.2)
BASOPHILS NFR BLD AUTO: 0.2 % (ref 0–1.5)
BUN BLDA-MCNC: 19 MG/DL (ref 7–22)
CALCIUM BLD QL: 9.6 MG/DL (ref 8–10.3)
CHLORIDE BLDA-SCNC: 105 MMOL/L (ref 98–108)
CO2 BLDA-SCNC: 26 MMOL/L (ref 18–33)
CREAT BLDA-MCNC: 1 MG/DL (ref 0.6–1.2)
DEPRECATED RDW RBC AUTO: 53.7 FL (ref 37–54)
EGFRCR SERPLBLD CKD-EPI 2021: 63.8 ML/MIN/1.73
EOSINOPHIL # BLD AUTO: 0 10*3/MM3 (ref 0–0.4)
EOSINOPHIL NFR BLD AUTO: 0 % (ref 0.3–6.2)
ERYTHROCYTE [DISTWIDTH] IN BLOOD BY AUTOMATED COUNT: 17.1 % (ref 12.3–15.4)
GLUCOSE BLDC GLUCOMTR-MCNC: 153 MG/DL (ref 73–118)
HCT VFR BLD AUTO: 39.7 % (ref 34–46.6)
HGB BLD-MCNC: 12.7 G/DL (ref 12–15.9)
LYMPHOCYTES # BLD AUTO: 0.77 10*3/MM3 (ref 0.7–3.1)
LYMPHOCYTES NFR BLD AUTO: 6.4 % (ref 19.6–45.3)
MCH RBC QN AUTO: 28.5 PG (ref 26.6–33)
MCHC RBC AUTO-ENTMCNC: 32 G/DL (ref 31.5–35.7)
MCV RBC AUTO: 89.2 FL (ref 79–97)
MONOCYTES # BLD AUTO: 0.38 10*3/MM3 (ref 0.1–0.9)
MONOCYTES NFR BLD AUTO: 3.2 % (ref 5–12)
NEUTROPHILS NFR BLD AUTO: 10.87 10*3/MM3 (ref 1.7–7)
NEUTROPHILS NFR BLD AUTO: 90.2 % (ref 42.7–76)
PLATELET # BLD AUTO: 228 10*3/MM3 (ref 140–450)
PMV BLD AUTO: 9.3 FL (ref 6–12)
POC ALBUMIN: 3.6 G/L (ref 3.3–5.5)
POC ALT (SGPT): 18 U/L (ref 10–47)
POC AST (SGOT): 23 U/L (ref 11–38)
POC TOTAL BILIRUBIN: 1 MG/DL (ref 0.2–1.6)
POC TOTAL PROTEIN: 7.5 G/DL (ref 6.4–8.1)
POTASSIUM BLDA-SCNC: 4.1 MMOL/L (ref 3.6–5.1)
RBC # BLD AUTO: 4.45 10*6/MM3 (ref 3.77–5.28)
SODIUM BLD-SCNC: 146 MMOL/L (ref 128–145)
WBC NRBC COR # BLD AUTO: 12.04 10*3/MM3 (ref 3.4–10.8)

## 2024-07-15 PROCEDURE — 80053 COMPREHEN METABOLIC PANEL: CPT

## 2024-07-15 PROCEDURE — 85025 COMPLETE CBC W/AUTO DIFF WBC: CPT | Performed by: INTERNAL MEDICINE

## 2024-07-15 PROCEDURE — 96413 CHEMO IV INFUSION 1 HR: CPT

## 2024-07-15 PROCEDURE — 25010000002 TRASTUZUMAB-ANNS 420 MG RECONSTITUTED SOLUTION 1 EACH VIAL: Performed by: INTERNAL MEDICINE

## 2024-07-15 PROCEDURE — 99215 OFFICE O/P EST HI 40 MIN: CPT | Performed by: INTERNAL MEDICINE

## 2024-07-15 PROCEDURE — 25010000002 DOCETAXEL 20 MG/ML SOLUTION 8 ML VIAL: Performed by: INTERNAL MEDICINE

## 2024-07-15 PROCEDURE — 25810000003 SODIUM CHLORIDE 0.9 % SOLUTION 250 ML FLEX CONT: Performed by: INTERNAL MEDICINE

## 2024-07-15 PROCEDURE — 25010000002 HEPARIN LOCK FLUSH PER 10 UNITS: Performed by: INTERNAL MEDICINE

## 2024-07-15 PROCEDURE — 96417 CHEMO IV INFUS EACH ADDL SEQ: CPT

## 2024-07-15 PROCEDURE — 25810000003 SODIUM CHLORIDE 0.9 % SOLUTION: Performed by: INTERNAL MEDICINE

## 2024-07-15 PROCEDURE — 25010000002 PERTUZUMAB 420 MG/14ML SOLUTION 420 MG VIAL: Performed by: INTERNAL MEDICINE

## 2024-07-15 RX ORDER — HEPARIN SODIUM (PORCINE) LOCK FLUSH IV SOLN 100 UNIT/ML 100 UNIT/ML
500 SOLUTION INTRAVENOUS AS NEEDED
Status: DISCONTINUED | OUTPATIENT
Start: 2024-07-15 | End: 2024-07-16 | Stop reason: HOSPADM

## 2024-07-15 RX ORDER — DEXAMETHASONE 4 MG/1
TABLET ORAL
Qty: 12 TABLET | Refills: 3 | Status: SHIPPED | OUTPATIENT
Start: 2024-07-15

## 2024-07-15 RX ORDER — SODIUM CHLORIDE 9 MG/ML
20 INJECTION, SOLUTION INTRAVENOUS ONCE
Status: COMPLETED | OUTPATIENT
Start: 2024-07-15 | End: 2024-07-15

## 2024-07-15 RX ORDER — SODIUM CHLORIDE 0.9 % (FLUSH) 0.9 %
10 SYRINGE (ML) INJECTION AS NEEDED
Status: CANCELLED | OUTPATIENT
Start: 2024-07-15

## 2024-07-15 RX ORDER — SODIUM CHLORIDE 9 MG/ML
20 INJECTION, SOLUTION INTRAVENOUS ONCE
Status: CANCELLED | OUTPATIENT
Start: 2024-07-15

## 2024-07-15 RX ORDER — HEPARIN SODIUM (PORCINE) LOCK FLUSH IV SOLN 100 UNIT/ML 100 UNIT/ML
500 SOLUTION INTRAVENOUS AS NEEDED
Status: CANCELLED | OUTPATIENT
Start: 2024-07-15

## 2024-07-15 RX ORDER — SODIUM CHLORIDE 0.9 % (FLUSH) 0.9 %
10 SYRINGE (ML) INJECTION AS NEEDED
Status: DISCONTINUED | OUTPATIENT
Start: 2024-07-15 | End: 2024-07-16 | Stop reason: HOSPADM

## 2024-07-15 RX ORDER — DIPHENHYDRAMINE HYDROCHLORIDE 50 MG/ML
50 INJECTION INTRAMUSCULAR; INTRAVENOUS AS NEEDED
Status: CANCELLED | OUTPATIENT
Start: 2024-07-15

## 2024-07-15 RX ORDER — FAMOTIDINE 10 MG/ML
20 INJECTION, SOLUTION INTRAVENOUS AS NEEDED
Status: CANCELLED | OUTPATIENT
Start: 2024-07-15

## 2024-07-15 RX ADMIN — SODIUM CHLORIDE 20 ML/HR: 9 INJECTION, SOLUTION INTRAVENOUS at 10:13

## 2024-07-15 RX ADMIN — HEPARIN 500 UNITS: 100 SYRINGE at 13:25

## 2024-07-15 RX ADMIN — TRASTUZUMAB-ANNS 510 MG: 420 INJECTION, POWDER, LYOPHILIZED, FOR SOLUTION INTRAVENOUS at 11:42

## 2024-07-15 RX ADMIN — Medication 10 ML: at 13:25

## 2024-07-15 RX ADMIN — DOCETAXEL 145 MG: 20 INJECTION, SOLUTION, CONCENTRATE INTRAVENOUS at 12:19

## 2024-07-15 RX ADMIN — PERTUZUMAB 420 MG: 30 INJECTION, SOLUTION, CONCENTRATE INTRAVENOUS at 10:37

## 2024-07-15 NOTE — ADDENDUM NOTE
Encounter addended by: Mildred Edward RN on: 7/15/2024 2:58 PM   Actions taken: Clinical Note Signed

## 2024-07-15 NOTE — PROGRESS NOTES
Port accessed and flushed with good blood return noted. 10cc of blood wasted prior to specimen collection. Blood specimen obtained and sent to lab for processing per protocol.  Port flushed with saline and needle left in for infusion today. CMP sent to salome, [facility]

## 2024-07-15 NOTE — PROGRESS NOTES
Patient came over from MD side with her port already accessed and labs already drawn. Patient's port had positive blood return noted prior to treatment starting. Patient stated she did take her steroids 2 tablets twice daily starting yesterday.  Patient tolerated treatment and was discharged with AVS after port de-accessed.

## 2024-07-18 ENCOUNTER — HOSPITAL ENCOUNTER (OUTPATIENT)
Dept: ONCOLOGY | Facility: HOSPITAL | Age: 64
Discharge: HOME | End: 2024-07-18
Payer: COMMERCIAL

## 2024-07-18 VITALS
SYSTOLIC BLOOD PRESSURE: 113 MMHG | TEMPERATURE: 97.9 F | RESPIRATION RATE: 16 BRPM | BODY MASS INDEX: 31.33 KG/M2 | OXYGEN SATURATION: 96 % | DIASTOLIC BLOOD PRESSURE: 72 MMHG | WEIGHT: 182.5 LBS | HEART RATE: 98 BPM

## 2024-07-18 DIAGNOSIS — Z17.0 BILATERAL MALIGNANT NEOPLASM OF BREAST IN FEMALE, ESTROGEN RECEPTOR POSITIVE, UNSPECIFIED SITE OF BREAST: Primary | ICD-10-CM

## 2024-07-18 DIAGNOSIS — C50.911 BILATERAL MALIGNANT NEOPLASM OF BREAST IN FEMALE, ESTROGEN RECEPTOR POSITIVE, UNSPECIFIED SITE OF BREAST: Primary | ICD-10-CM

## 2024-07-18 DIAGNOSIS — C50.912 BILATERAL MALIGNANT NEOPLASM OF BREAST IN FEMALE, ESTROGEN RECEPTOR POSITIVE, UNSPECIFIED SITE OF BREAST: Primary | ICD-10-CM

## 2024-07-18 DIAGNOSIS — E86.0 DEHYDRATION: ICD-10-CM

## 2024-07-18 DIAGNOSIS — R91.8 LUNG MASS: ICD-10-CM

## 2024-07-18 PROCEDURE — 25010000002 ONDANSETRON PER 1 MG: Performed by: INTERNAL MEDICINE

## 2024-07-18 PROCEDURE — 96374 THER/PROPH/DIAG INJ IV PUSH: CPT

## 2024-07-18 PROCEDURE — 25010000002 HEPARIN LOCK FLUSH PER 10 UNITS: Performed by: INTERNAL MEDICINE

## 2024-07-18 PROCEDURE — 25810000003 SODIUM CHLORIDE 0.9 % SOLUTION: Performed by: INTERNAL MEDICINE

## 2024-07-18 RX ORDER — ONDANSETRON 2 MG/ML
8 INJECTION INTRAMUSCULAR; INTRAVENOUS ONCE
Status: COMPLETED | OUTPATIENT
Start: 2024-07-18 | End: 2024-07-18

## 2024-07-18 RX ORDER — SODIUM CHLORIDE 0.9 % (FLUSH) 0.9 %
10 SYRINGE (ML) INJECTION AS NEEDED
OUTPATIENT
Start: 2024-07-18

## 2024-07-18 RX ORDER — HEPARIN SODIUM (PORCINE) LOCK FLUSH IV SOLN 100 UNIT/ML 100 UNIT/ML
500 SOLUTION INTRAVENOUS AS NEEDED
OUTPATIENT
Start: 2024-07-18

## 2024-07-18 RX ORDER — HEPARIN SODIUM (PORCINE) LOCK FLUSH IV SOLN 100 UNIT/ML 100 UNIT/ML
500 SOLUTION INTRAVENOUS AS NEEDED
Status: DISCONTINUED | OUTPATIENT
Start: 2024-07-18 | End: 2024-07-19 | Stop reason: HOSPADM

## 2024-07-18 RX ORDER — SODIUM CHLORIDE 0.9 % (FLUSH) 0.9 %
10 SYRINGE (ML) INJECTION AS NEEDED
Status: DISCONTINUED | OUTPATIENT
Start: 2024-07-18 | End: 2024-07-19 | Stop reason: HOSPADM

## 2024-07-18 RX ADMIN — Medication 10 ML: at 15:25

## 2024-07-18 RX ADMIN — HEPARIN 500 UNITS: 100 SYRINGE at 15:25

## 2024-07-18 RX ADMIN — ONDANSETRON 8 MG: 2 INJECTION INTRAMUSCULAR; INTRAVENOUS at 14:18

## 2024-07-18 RX ADMIN — SODIUM CHLORIDE 1000 ML: 9 INJECTION, SOLUTION INTRAVENOUS at 14:15

## 2024-07-18 NOTE — PROGRESS NOTES
Pt in office today for IVF and Iv zofran. [facility] infusaport accessed per protocol with a 20g 1.5 inch needle. Positive blood return. IVF and zofran as ordered. Pt stated she is starting to feel bad. She denied any vomiting at this time. She c/o nausea starting today.

## 2024-08-05 ENCOUNTER — HOSPITAL ENCOUNTER (OUTPATIENT)
Dept: ONCOLOGY | Facility: HOSPITAL | Age: 64
Discharge: HOME | End: 2024-08-05
Admitting: NURSE PRACTITIONER
Payer: COMMERCIAL

## 2024-08-05 VITALS
SYSTOLIC BLOOD PRESSURE: 113 MMHG | WEIGHT: 181.4 LBS | RESPIRATION RATE: 16 BRPM | HEIGHT: 64 IN | HEART RATE: 92 BPM | DIASTOLIC BLOOD PRESSURE: 75 MMHG | TEMPERATURE: 98 F | BODY MASS INDEX: 30.97 KG/M2 | OXYGEN SATURATION: 97 %

## 2024-08-05 DIAGNOSIS — C50.912 BILATERAL MALIGNANT NEOPLASM OF BREAST IN FEMALE, ESTROGEN RECEPTOR POSITIVE, UNSPECIFIED SITE OF BREAST: Primary | ICD-10-CM

## 2024-08-05 DIAGNOSIS — C50.911 CARCINOMA OF RIGHT BREAST METASTATIC TO LUNG: ICD-10-CM

## 2024-08-05 DIAGNOSIS — Z17.0 BILATERAL MALIGNANT NEOPLASM OF BREAST IN FEMALE, ESTROGEN RECEPTOR POSITIVE, UNSPECIFIED SITE OF BREAST: Primary | ICD-10-CM

## 2024-08-05 DIAGNOSIS — C50.911 BILATERAL MALIGNANT NEOPLASM OF BREAST IN FEMALE, ESTROGEN RECEPTOR POSITIVE, UNSPECIFIED SITE OF BREAST: Primary | ICD-10-CM

## 2024-08-05 DIAGNOSIS — R91.8 LUNG MASS: ICD-10-CM

## 2024-08-05 DIAGNOSIS — C78.00 CARCINOMA OF RIGHT BREAST METASTATIC TO LUNG: ICD-10-CM

## 2024-08-05 DIAGNOSIS — Z51.11 ENCOUNTER FOR ANTINEOPLASTIC CHEMOTHERAPY: ICD-10-CM

## 2024-08-05 LAB
ALP BLD-CCNC: 84 U/L (ref 42–141)
BASOPHILS # BLD AUTO: 0.01 10*3/MM3 (ref 0–0.2)
BASOPHILS NFR BLD AUTO: 0.1 % (ref 0–1.5)
BUN BLDA-MCNC: 15 MG/DL (ref 7–22)
CALCIUM BLD QL: 9.9 MG/DL (ref 8–10.3)
CHLORIDE BLDA-SCNC: 109 MMOL/L (ref 98–108)
CO2 BLDA-SCNC: 26 MMOL/L (ref 18–33)
CREAT BLDA-MCNC: 0.8 MG/DL (ref 0.6–1.2)
DEPRECATED RDW RBC AUTO: 56.5 FL (ref 37–54)
EGFRCR SERPLBLD CKD-EPI 2021: 83.4 ML/MIN/1.73
EOSINOPHIL # BLD AUTO: 0 10*3/MM3 (ref 0–0.4)
EOSINOPHIL NFR BLD AUTO: 0 % (ref 0.3–6.2)
ERYTHROCYTE [DISTWIDTH] IN BLOOD BY AUTOMATED COUNT: 17.9 % (ref 12.3–15.4)
GLUCOSE BLDC GLUCOMTR-MCNC: 180 MG/DL (ref 73–118)
HCT VFR BLD AUTO: 37.6 % (ref 34–46.6)
HGB BLD-MCNC: 11.8 G/DL (ref 12–15.9)
LYMPHOCYTES # BLD AUTO: 0.76 10*3/MM3 (ref 0.7–3.1)
LYMPHOCYTES NFR BLD AUTO: 7.2 % (ref 19.6–45.3)
MCH RBC QN AUTO: 28 PG (ref 26.6–33)
MCHC RBC AUTO-ENTMCNC: 31.4 G/DL (ref 31.5–35.7)
MCV RBC AUTO: 89.1 FL (ref 79–97)
MONOCYTES # BLD AUTO: 0.33 10*3/MM3 (ref 0.1–0.9)
MONOCYTES NFR BLD AUTO: 3.1 % (ref 5–12)
NEUTROPHILS NFR BLD AUTO: 89.6 % (ref 42.7–76)
NEUTROPHILS NFR BLD AUTO: 9.39 10*3/MM3 (ref 1.7–7)
PLATELET # BLD AUTO: 221 10*3/MM3 (ref 140–450)
PMV BLD AUTO: 9.2 FL (ref 6–12)
POC ALBUMIN: 3.6 G/L (ref 3.3–5.5)
POC ALT (SGPT): 12 U/L (ref 10–47)
POC AST (SGOT): 24 U/L (ref 11–38)
POC TOTAL BILIRUBIN: 0.8 MG/DL (ref 0.2–1.6)
POC TOTAL PROTEIN: 7.6 G/DL (ref 6.4–8.1)
POTASSIUM BLDA-SCNC: 4.3 MMOL/L (ref 3.6–5.1)
RBC # BLD AUTO: 4.22 10*6/MM3 (ref 3.77–5.28)
SODIUM BLD-SCNC: 146 MMOL/L (ref 128–145)
WBC NRBC COR # BLD AUTO: 10.49 10*3/MM3 (ref 3.4–10.8)

## 2024-08-05 PROCEDURE — 25010000002 HEPARIN LOCK FLUSH PER 10 UNITS: Performed by: INTERNAL MEDICINE

## 2024-08-05 PROCEDURE — 96413 CHEMO IV INFUSION 1 HR: CPT

## 2024-08-05 PROCEDURE — 85025 COMPLETE CBC W/AUTO DIFF WBC: CPT | Performed by: NURSE PRACTITIONER

## 2024-08-05 PROCEDURE — 25010000002 PERTUZUMAB 420 MG/14ML SOLUTION 420 MG VIAL: Performed by: PHYSICIAN ASSISTANT

## 2024-08-05 PROCEDURE — 25810000003 SODIUM CHLORIDE 0.9 % SOLUTION 250 ML FLEX CONT: Performed by: PHYSICIAN ASSISTANT

## 2024-08-05 PROCEDURE — 96417 CHEMO IV INFUS EACH ADDL SEQ: CPT

## 2024-08-05 PROCEDURE — 80053 COMPREHEN METABOLIC PANEL: CPT

## 2024-08-05 PROCEDURE — 25810000003 SODIUM CHLORIDE 0.9 % SOLUTION: Performed by: PHYSICIAN ASSISTANT

## 2024-08-05 PROCEDURE — 25010000002 TRASTUZUMAB-ANNS 420 MG RECONSTITUTED SOLUTION 1 EACH VIAL: Performed by: PHYSICIAN ASSISTANT

## 2024-08-05 PROCEDURE — 25010000002 DOCETAXEL 20 MG/ML SOLUTION 8 ML VIAL: Performed by: PHYSICIAN ASSISTANT

## 2024-08-05 RX ORDER — SODIUM CHLORIDE 0.9 % (FLUSH) 0.9 %
10 SYRINGE (ML) INJECTION AS NEEDED
Status: DISCONTINUED | OUTPATIENT
Start: 2024-08-05 | End: 2024-08-06 | Stop reason: HOSPADM

## 2024-08-05 RX ORDER — HEPARIN SODIUM (PORCINE) LOCK FLUSH IV SOLN 100 UNIT/ML 100 UNIT/ML
500 SOLUTION INTRAVENOUS AS NEEDED
Status: DISCONTINUED | OUTPATIENT
Start: 2024-08-05 | End: 2024-08-06 | Stop reason: HOSPADM

## 2024-08-05 RX ORDER — HEPARIN SODIUM (PORCINE) LOCK FLUSH IV SOLN 100 UNIT/ML 100 UNIT/ML
500 SOLUTION INTRAVENOUS AS NEEDED
Status: CANCELLED | OUTPATIENT
Start: 2024-08-05

## 2024-08-05 RX ORDER — FAMOTIDINE 10 MG/ML
20 INJECTION, SOLUTION INTRAVENOUS AS NEEDED
Status: CANCELLED | OUTPATIENT
Start: 2024-08-05

## 2024-08-05 RX ORDER — SODIUM CHLORIDE 9 MG/ML
20 INJECTION, SOLUTION INTRAVENOUS ONCE
Status: COMPLETED | OUTPATIENT
Start: 2024-08-05 | End: 2024-08-05

## 2024-08-05 RX ORDER — DIPHENHYDRAMINE HYDROCHLORIDE 50 MG/ML
50 INJECTION INTRAMUSCULAR; INTRAVENOUS AS NEEDED
Status: CANCELLED | OUTPATIENT
Start: 2024-08-05

## 2024-08-05 RX ORDER — SODIUM CHLORIDE 0.9 % (FLUSH) 0.9 %
10 SYRINGE (ML) INJECTION AS NEEDED
Status: CANCELLED | OUTPATIENT
Start: 2024-08-05

## 2024-08-05 RX ADMIN — HEPARIN 500 UNITS: 100 SYRINGE at 13:54

## 2024-08-05 RX ADMIN — SODIUM CHLORIDE 20 ML/HR: 9 INJECTION, SOLUTION INTRAVENOUS at 10:31

## 2024-08-05 RX ADMIN — Medication 10 ML: at 13:54

## 2024-08-05 RX ADMIN — PERTUZUMAB 420 MG: 30 INJECTION, SOLUTION, CONCENTRATE INTRAVENOUS at 10:31

## 2024-08-05 RX ADMIN — DOCETAXEL 145 MG: 20 INJECTION, SOLUTION, CONCENTRATE INTRAVENOUS at 12:35

## 2024-08-05 RX ADMIN — TRASTUZUMAB-ANNS 490 MG: 420 INJECTION, POWDER, LYOPHILIZED, FOR SOLUTION INTRAVENOUS at 11:45

## 2024-08-08 ENCOUNTER — HOSPITAL ENCOUNTER (OUTPATIENT)
Dept: ONCOLOGY | Facility: HOSPITAL | Age: 64
Discharge: HOME | End: 2024-08-08
Payer: COMMERCIAL

## 2024-08-08 VITALS
TEMPERATURE: 98 F | BODY MASS INDEX: 30.54 KG/M2 | HEART RATE: 89 BPM | RESPIRATION RATE: 16 BRPM | WEIGHT: 178.9 LBS | DIASTOLIC BLOOD PRESSURE: 78 MMHG | OXYGEN SATURATION: 95 % | HEIGHT: 64 IN | SYSTOLIC BLOOD PRESSURE: 110 MMHG

## 2024-08-08 DIAGNOSIS — E86.0 DEHYDRATION: Primary | ICD-10-CM

## 2024-08-08 DIAGNOSIS — R91.8 LUNG MASS: ICD-10-CM

## 2024-08-08 PROCEDURE — 25010000002 ONDANSETRON PER 1 MG: Performed by: INTERNAL MEDICINE

## 2024-08-08 PROCEDURE — 96361 HYDRATE IV INFUSION ADD-ON: CPT

## 2024-08-08 PROCEDURE — 25810000003 SODIUM CHLORIDE 0.9 % SOLUTION: Performed by: INTERNAL MEDICINE

## 2024-08-08 PROCEDURE — 25010000002 HEPARIN LOCK FLUSH PER 10 UNITS: Performed by: INTERNAL MEDICINE

## 2024-08-08 PROCEDURE — 96374 THER/PROPH/DIAG INJ IV PUSH: CPT

## 2024-08-08 RX ORDER — HEPARIN SODIUM (PORCINE) LOCK FLUSH IV SOLN 100 UNIT/ML 100 UNIT/ML
500 SOLUTION INTRAVENOUS AS NEEDED
OUTPATIENT
Start: 2024-08-08

## 2024-08-08 RX ORDER — SODIUM CHLORIDE 0.9 % (FLUSH) 0.9 %
10 SYRINGE (ML) INJECTION AS NEEDED
OUTPATIENT
Start: 2024-08-08

## 2024-08-08 RX ORDER — SODIUM CHLORIDE 0.9 % (FLUSH) 0.9 %
10 SYRINGE (ML) INJECTION AS NEEDED
Status: DISCONTINUED | OUTPATIENT
Start: 2024-08-08 | End: 2024-08-09 | Stop reason: HOSPADM

## 2024-08-08 RX ORDER — HEPARIN SODIUM (PORCINE) LOCK FLUSH IV SOLN 100 UNIT/ML 100 UNIT/ML
500 SOLUTION INTRAVENOUS AS NEEDED
Status: DISCONTINUED | OUTPATIENT
Start: 2024-08-08 | End: 2024-08-09 | Stop reason: HOSPADM

## 2024-08-08 RX ORDER — ONDANSETRON 2 MG/ML
8 INJECTION INTRAMUSCULAR; INTRAVENOUS ONCE
Status: COMPLETED | OUTPATIENT
Start: 2024-08-08 | End: 2024-08-08

## 2024-08-08 RX ADMIN — SODIUM CHLORIDE 1000 ML: 9 INJECTION, SOLUTION INTRAVENOUS at 13:27

## 2024-08-08 RX ADMIN — Medication 10 ML: at 14:42

## 2024-08-08 RX ADMIN — HEPARIN 500 UNITS: 100 SYRINGE at 14:42

## 2024-08-08 RX ADMIN — ONDANSETRON 8 MG: 2 INJECTION INTRAMUSCULAR; INTRAVENOUS at 13:29

## 2024-08-08 NOTE — PROGRESS NOTES
HEMATOLOGY ONCOLOGY OUTPATIENT FOLLOW UP       Patient name: Tracy Reddy  : 1960  MRN: [id number]  Primary Care Physician: Provider, No Known  Referring Physician: No ref. provider found  Reason For Consult: breast cancer    History of Present Illness:    Tracy Reddy is a 63 y.o. female who presented to [facility] on 4/3/2024 with complaints of cough.  She initially presented to [facility] ED with complaints of fever up to 103 °F, productive cough with green and brown sputum, shortness of air and chest tightness that has been ongoing for several days.  She reports she has family members that have been sick at home.  She reports she has also had a history of PE in the past.  Labs show troponin was 13 x 2, .8.  CBC unremarkable.  Respiratory panel was negative. Chest x-ray showed masslike opacity within the right lower lobe.  Sputum cultures and blood cultures have been ordered.  She is a non-smoker.  Pulmonology also has been consulted.     24 CT chest angiogram  Impression:  1. No evidence of pulmonary embolism.  2. No acute cardiopulmonary process. There is a mass present within the right lower lobe likely representing malignancy. There is enlarged right hilar adenopathy likely representing local metastatic disease. No previous imaging available for comparison.        24  Hematology/Oncology was consulted.       From record review: Patient has a history of bilateral ductal carcinoma diagnosed in .  Right breast IDC, G1, ER positive, MT positive, HER-2 negative. Left breast IDC, G2, ER positive, MT positive, HER-2 negative. Left SLND revealed 1 of 2 lymph nodes positive for macro metastasis. She is status post bilateral mastectomy with reconstruction in .  She is also completed neoadjuvant chemotherapy with 6 cycles of TCHP.  Taxotere and carboplatin with dose reductions at cycle 2 secondary to chemotherapy-induced nausea and vomiting.  Completed  adjuvant Herceptin Perjeta for 1 year.  Status post radiation therapy left chest wall and regional nodes, 5 fractions for a total dose of 5000 cGray. Current treatment with hormone blockade with Arimidex.  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  She follows at [facility], reviewed most recent office note from 3/11/2024.     4/5/2024 status post bronchoscopy.  EBUS guided FNA of the right lower lobe hilar mass confirmed metastatic ductal carcinoma of the breast ER +100% SD +10%, HER2 nu pending  Respiratory panel positive for human metapneumovirus.    4/12/2024 PET/CT with 1.1 cm lesion in the region of the left vallecula concerning for second primary malignancy.  Recommend correlation with direct visualization, large right hilar mass consistent with malignancy, no evidence of metastatic disease elsewhere    Path with HER 2 positive 3+ by IHC, guardant NGS with PIK3CA     5/13/24 - started THP  6/3/24 - C2  6/24/24 - C3  7/15/2024: Patient received cycle 4 chemotherapy with Taxotere Herceptin Perjeta  8/5/2024: Patient received cycle 5 Taxotere Herceptin Perjeta      7/12/24 - CT imaging - There is decrease in size of the right hilar mass compared to the prior examination. This now measures approximately 3.0 x 1.5 cm, previously measured 4.2 x 3.4 cm using similar measurement technique.      Subjective:  Continues to tolerate treatment well, has had some rash.    Patient is here today for follow-up.      She is transferring her oncology care to me    Past Medical History:   Diagnosis Date    Cancer        Past Surgical History:   Procedure Laterality Date    BRONCHOSCOPY N/A 4/5/2024    Procedure: BRONCHOSCOPY WITH ENDOBRONCHIAL ULTRASOUND, bilateral bronchial washings, fine needle aspiration x 2 areas;  Surgeon: Edy Irving MD;  Location: [facility] ENDOSCOPY;  Service: Pulmonary;  Laterality: N/A;  post: lung mass    MASTECTOMY           Current Outpatient Medications:     Calcium  Citrate-Vitamin D (Citrus Calcium/Vitamin D) 200-6.25 MG-MCG tablet, Take  by mouth., Disp: , Rfl:     cetirizine (zyrTEC) 10 MG tablet, Take 1 tablet by mouth Daily., Disp: , Rfl:     dexAMETHasone (DECADRON) 4 MG tablet, Take 2 tablets oral twice a day for 3 consecutive days beginning the day before chemotherapy and continue for 6 doses., Disp: 12 tablet, Rfl: 3    omeprazole (priLOSEC) 20 MG capsule, Take 1 capsule by mouth Daily., Disp: 30 capsule, Rfl: 5    ondansetron (ZOFRAN) 8 MG tablet, Take 1 tablet by mouth 3 (Three) Times a Day As Needed for Nausea or Vomiting., Disp: 30 tablet, Rfl: 5    promethazine (PHENERGAN) 12.5 MG tablet, Take 1 tablet by mouth Every 6 (Six) Hours As Needed for Nausea or Vomiting., Disp: 30 tablet, Rfl: 5  No current facility-administered medications for this visit.    Facility-Administered Medications Ordered in Other Visits:     heparin injection 500 Units, 500 Units, Intravenous, Justina HENRY Amitoj, MD, 500 Units at 08/13/24 0845    sodium chloride 0.9 % flush 10 mL, 10 mL, Intravenous, PRN, Pippa Horn MD, 10 mL at 08/13/24 0844    Allergies   Allergen Reactions    Penicillins Rash       No family history on file.    Cancer-related family history is not on file.    Social History     Tobacco Use    Smoking status: Never     Passive exposure: Never    Smokeless tobacco: Never   Vaping Use    Vaping status: Never Used   Substance Use Topics    Alcohol use: Never    Drug use: Never     Social History     Social History Narrative    Not on file      ROS:   Review of Systems   Constitutional:  Negative for chills, fatigue and fever.   HENT:  Negative for congestion and nosebleeds.    Eyes:  Negative for pain and itching.   Respiratory:  Negative for cough and shortness of breath.    Cardiovascular:  Negative for chest pain.   Gastrointestinal:  Negative for abdominal pain, blood in stool, diarrhea, nausea and vomiting.   Endocrine: Negative for cold intolerance and heat  "intolerance.   Genitourinary:  Negative for difficulty urinating.   Musculoskeletal:  Negative for arthralgias.   Skin:  Negative for rash.   Neurological:  Negative for dizziness and headaches.   Hematological:  Does not bruise/bleed easily.   Psychiatric/Behavioral:  Negative for behavioral problems.        Objective:  Vital signs:  Vitals:    08/13/24 0856   BP: 132/68   Pulse: 111   Resp: 18   Temp: 98.2 °F (36.8 °C)   TempSrc: Infrared   SpO2: 96%   Weight: 81.6 kg (180 lb)   Height: 162.6 cm (64\")   PainSc: 0-No pain           Body mass index is 30.9 kg/m².  ECOG  (0) Fully active, able to carry on all predisease performance without restriction    Physical Exam:   Physical Exam  Constitutional:       Appearance: Normal appearance.   HENT:      Head: Normocephalic and atraumatic.   Eyes:      Pupils: Pupils are equal, round, and reactive to light.   Cardiovascular:      Rate and Rhythm: Normal rate and regular rhythm.      Pulses: Normal pulses.      Heart sounds: Normal heart sounds. No murmur heard.  Pulmonary:      Effort: Pulmonary effort is normal.      Breath sounds: Normal breath sounds.   Abdominal:      General: There is no distension.      Palpations: Abdomen is soft. There is no mass.      Tenderness: There is no abdominal tenderness.   Musculoskeletal:         General: Normal range of motion.      Cervical back: Normal range of motion and neck supple.   Skin:     General: Skin is warm.   Neurological:      General: No focal deficit present.      Mental Status: She is alert.   Psychiatric:         Mood and Affect: Mood normal.     I have reexamined the patient and the results are consistent with the previously documented exam. Cadence Nazario MD      Lab Results - Last 18 Months   Lab Units 08/13/24  0829 08/05/24  0829 07/15/24  0841   WBC 10*3/mm3 0.96* 10.49 12.04*   HEMOGLOBIN g/dL 12.1 11.8* 12.7   HEMATOCRIT % 37.6 37.6 39.7   PLATELETS 10*3/mm3 190 221 228   MCV fL 87.4 89.1 89.2 " "    Lab Results - Last 18 Months   Lab Units 08/05/24  0924 07/15/24  0924 06/24/24  0842 05/13/24  0909 05/13/24  0835 04/05/24  0415 04/04/24  0606 04/03/24  2251   SODIUM mmol/L  --   --   --   --  141 140 138 134*   POTASSIUM mmol/L  --   --   --   --  4.1 4.2 4.0 3.9   CHLORIDE mmol/L  --   --   --   --  105 104 103 102   CO2 mmol/L  --   --   --   --  22.0 23.0 24.0 22.1   BUN mg/dL  --   --   --   --  21 11 16 19   CREATININE mg/dL 0.80 1.00 0.90   < > 0.95 0.89 1.01* 1.05*   CALCIUM mg/dL  --   --   --   --  9.7 9.2 8.9 9.5   BILIRUBIN mg/dL  --   --   --   --  0.5  --   --  0.6   ALK PHOS U/L  --   --   --   --  92  --   --  80   ALT (SGPT) U/L  --   --   --   --  10  --   --  15   AST (SGOT) U/L  --   --   --   --  17  --   --  22   GLUCOSE mg/dL  --   --   --   --  192* 94 125* 131*    < > = values in this interval not displayed.       Lab Results   Component Value Date    GLUCOSE 192 (H) 05/13/2024    BUN 21 05/13/2024    CREATININE 0.80 08/05/2024    EGFRIFNONA 57 (L) 11/10/2021    EGFRIFAFRI 66 11/10/2021    BCR 22.1 05/13/2024    K 4.1 05/13/2024    CO2 22.0 05/13/2024    CALCIUM 9.7 05/13/2024    ALBUMIN 4.3 05/13/2024    AST 17 05/13/2024    ALT 10 05/13/2024       Lab Results - Last 18 Months   Lab Units 04/05/24  0415   INR  0.99       No results found for: \"IRON\", \"TIBC\", \"FERRITIN\"    No results found for: \"FOLATE\"    No results found for: \"OCCULTBLD\"    No results found for: \"RETICCTPCT\"  No results found for: \"GQFLLVAD86\"  No results found for: \"SPEP\", \"UPEP\"  No results found for: \"LDH\", \"URICACID\"  No results found for: \"BABAK\", \"RF\", \"SEDRATE\"  No results found for: \"FIBRINOGEN\", \"HAPTOGLOBIN\"  Lab Results   Component Value Date    INR 0.99 04/05/2024     No results found for: \"\"  No results found for: \"CEA\"  No components found for: \"CA-19-9\"  No results found for: \"PSA\"  No results found for: \"SEDRATE\"    Assessment & Plan     Tracy Reddy is a 63 y.o. female with history of " pulmonary embolism and bilateral invasive ductal carcinoma status post bilateral mastectomy, chemotherapy and radiation treatments, on Arimidex.  She presented with dyspnea and was found to have a lung mass in the right lower lobe with right hilar and suprahilar lymphadenopathy.     Right lung mass-metastatic ductal carcinoma of the breast -recurrent, ER/CT positive HER2/nancy 3+ on immunohistochemistry    CT imaging showed mass present within the medial right lower lobe measuring up to 3.2 x 4.1 cm that extends to the right hilar region.  Additional conglomerate of nodes present within right hilar to right suprahilar region measuring up to 2.7 x 1.8 cm.  Status post bronchoscopy EBUS 4/5/2024.    Biopsy confirms metastatic ductal carcinoma of the breast ER/CT positive, HER2 nu pending.  Further treatment will depend on HER2/nancy status.  Will also send tumor sample out for [facility] NGS testing.  She is on Arimidex right now but will need to switch to an alternate AI or a different agent given mutational analysis findings.  If HER2 positive disease, will use HER2 blockade.  Given there is no other clear areas of metastasis.  We can still consider curative treatment with either radio-ablation or surgical resection.    Discussed at tumor board. Not a surgical candidate based on tumor location. Radiation possible. Will plan on starting chemo with herceptin perjeta and then consider radiation after 6 cycles.    NGS testing with guardant showing PIK3CA.  See report    Will then continue herceptin perjeta maintenance    Started treatment tolerating well has some side effects as above not significant to need any dose modification  CT imaging now with good response. Reviewed images independently and showed the patient. Reviewed labs, and notes from other providers.ordered labs    Our plan would be to continue 3 more cycles of Taxotere, Herceptin and Perjeta and then discuss at tumor board again about consolidation with surgery vs  radiation. Initial thought was that radiation would be preferable given location.  Continue treatment no changes.    Continue Taxotere, Herceptin and Perjeta    Resume AI after chemotherapy can start letrozole at that point.    Insomnia  - complains more with steroids  Takes melatonin      History of bilateral invasive ductal carcinoma  -Diagnosed in 2020. She is status post bilateral mastectomy with reconstruction in 2021.  She is also completed neoadjuvant chemotherapy with 6 cycles of TCHP.  Taxotere and carboplatin with dose reductions at cycle 2 secondary to chemotherapy-induced nausea and vomiting.  There is some confusion about her pathology as some reports show HER2 negative, some show positive.  However she did receive adjuvant Herceptin Perjeta maintenance which likely points towards HER2 positive disease.  She is also known to have CHEK2 mutation.  Completed adjuvant Herceptin Perjeta for 1 year.  Status post radiation therapy left chest wall and regional nodes, 5 fractions for a total dose of 5000 cGray. Current treatment with hormone blockade with Arimidex. She followed at [facility] has transferred care to us now         Pneumonia  Resolved  Lingering cough now    Osteopenia  DEXA with osteopenia. continue calcium vitamin D.    Rash  Likely herceptin related  Lasted for a week   Recommend hydrocortisone topical   Rash has improved    Neutropenia due to chemotherapy    Neutropenic precautions  Add Neulasta with cycle #6  Empiric antibiotics with Levaquin    Diarrhea   Mild      Needs 2 D echo q 3 months for her 2 directed therapy    Continue treatment  On chemotherapy requiring intensive monitoring.  Follow-up in 3 weeks    PET scan following cycle #6 chemotherapy        I spent 40 total minutes, face-to-face, caring for Tracy baires. 90% of this time involved counseling and/or coordination of care as documented within this note.

## 2024-08-13 ENCOUNTER — OFFICE VISIT (OUTPATIENT)
Dept: ONCOLOGY | Facility: CLINIC | Age: 64
End: 2024-08-13
Payer: COMMERCIAL

## 2024-08-13 ENCOUNTER — HOSPITAL ENCOUNTER (OUTPATIENT)
Dept: ONCOLOGY | Facility: HOSPITAL | Age: 64
Discharge: HOME | End: 2024-08-13
Admitting: INTERNAL MEDICINE
Payer: COMMERCIAL

## 2024-08-13 VITALS
BODY MASS INDEX: 30.73 KG/M2 | OXYGEN SATURATION: 96 % | RESPIRATION RATE: 18 BRPM | WEIGHT: 180 LBS | TEMPERATURE: 98.2 F | HEIGHT: 64 IN | HEART RATE: 111 BPM | DIASTOLIC BLOOD PRESSURE: 68 MMHG | SYSTOLIC BLOOD PRESSURE: 132 MMHG

## 2024-08-13 DIAGNOSIS — C50.911 CARCINOMA OF RIGHT BREAST METASTATIC TO LUNG: Primary | ICD-10-CM

## 2024-08-13 DIAGNOSIS — Z17.0 BILATERAL MALIGNANT NEOPLASM OF BREAST IN FEMALE, ESTROGEN RECEPTOR POSITIVE, UNSPECIFIED SITE OF BREAST: ICD-10-CM

## 2024-08-13 DIAGNOSIS — C78.00 CARCINOMA OF RIGHT BREAST METASTATIC TO LUNG: ICD-10-CM

## 2024-08-13 DIAGNOSIS — C50.912 BILATERAL MALIGNANT NEOPLASM OF BREAST IN FEMALE, ESTROGEN RECEPTOR POSITIVE, UNSPECIFIED SITE OF BREAST: ICD-10-CM

## 2024-08-13 DIAGNOSIS — R91.8 LUNG MASS: Primary | ICD-10-CM

## 2024-08-13 DIAGNOSIS — C78.00 CARCINOMA OF RIGHT BREAST METASTATIC TO LUNG: Primary | ICD-10-CM

## 2024-08-13 DIAGNOSIS — C50.911 BILATERAL MALIGNANT NEOPLASM OF BREAST IN FEMALE, ESTROGEN RECEPTOR POSITIVE, UNSPECIFIED SITE OF BREAST: ICD-10-CM

## 2024-08-13 DIAGNOSIS — C50.911 CARCINOMA OF RIGHT BREAST METASTATIC TO LUNG: ICD-10-CM

## 2024-08-13 DIAGNOSIS — D70.9 NEUTROPENIA, UNSPECIFIED TYPE: ICD-10-CM

## 2024-08-13 LAB
BASOPHILS # BLD AUTO: 0.02 10*3/MM3 (ref 0–0.2)
BASOPHILS NFR BLD AUTO: 2.1 % (ref 0–1.5)
DEPRECATED RDW RBC AUTO: 55.6 FL (ref 37–54)
EOSINOPHIL # BLD AUTO: 0.05 10*3/MM3 (ref 0–0.4)
EOSINOPHIL NFR BLD AUTO: 5.2 % (ref 0.3–6.2)
ERYTHROCYTE [DISTWIDTH] IN BLOOD BY AUTOMATED COUNT: 17.6 % (ref 12.3–15.4)
HCT VFR BLD AUTO: 37.6 % (ref 34–46.6)
HGB BLD-MCNC: 12.1 G/DL (ref 12–15.9)
LYMPHOCYTES # BLD AUTO: 0.61 10*3/MM3 (ref 0.7–3.1)
LYMPHOCYTES NFR BLD AUTO: 63.5 % (ref 19.6–45.3)
MCH RBC QN AUTO: 28.1 PG (ref 26.6–33)
MCHC RBC AUTO-ENTMCNC: 32.2 G/DL (ref 31.5–35.7)
MCV RBC AUTO: 87.4 FL (ref 79–97)
MONOCYTES # BLD AUTO: 0.28 10*3/MM3 (ref 0.1–0.9)
MONOCYTES NFR BLD AUTO: 29.2 % (ref 5–12)
NEUTROPHILS NFR BLD AUTO: 0 % (ref 42.7–76)
NEUTROPHILS NFR BLD AUTO: 0 10*3/MM3 (ref 1.7–7)
PLATELET # BLD AUTO: 190 10*3/MM3 (ref 140–450)
PMV BLD AUTO: 8.9 FL (ref 6–12)
RBC # BLD AUTO: 4.3 10*6/MM3 (ref 3.77–5.28)
WBC NRBC COR # BLD AUTO: 0.96 10*3/MM3 (ref 3.4–10.8)

## 2024-08-13 PROCEDURE — 85025 COMPLETE CBC W/AUTO DIFF WBC: CPT | Performed by: INTERNAL MEDICINE

## 2024-08-13 PROCEDURE — 36591 DRAW BLOOD OFF VENOUS DEVICE: CPT

## 2024-08-13 PROCEDURE — 25010000002 HEPARIN LOCK FLUSH PER 10 UNITS: Performed by: INTERNAL MEDICINE

## 2024-08-13 PROCEDURE — 99215 OFFICE O/P EST HI 40 MIN: CPT | Performed by: INTERNAL MEDICINE

## 2024-08-13 RX ORDER — SODIUM CHLORIDE 0.9 % (FLUSH) 0.9 %
10 SYRINGE (ML) INJECTION AS NEEDED
Status: DISCONTINUED | OUTPATIENT
Start: 2024-08-13 | End: 2024-08-14 | Stop reason: HOSPADM

## 2024-08-13 RX ORDER — HEPARIN SODIUM (PORCINE) LOCK FLUSH IV SOLN 100 UNIT/ML 100 UNIT/ML
500 SOLUTION INTRAVENOUS AS NEEDED
OUTPATIENT
Start: 2024-08-13

## 2024-08-13 RX ORDER — HEPARIN SODIUM (PORCINE) LOCK FLUSH IV SOLN 100 UNIT/ML 100 UNIT/ML
500 SOLUTION INTRAVENOUS AS NEEDED
Status: DISCONTINUED | OUTPATIENT
Start: 2024-08-13 | End: 2024-08-14 | Stop reason: HOSPADM

## 2024-08-13 RX ORDER — SODIUM CHLORIDE 0.9 % (FLUSH) 0.9 %
10 SYRINGE (ML) INJECTION AS NEEDED
OUTPATIENT
Start: 2024-08-13

## 2024-08-13 RX ORDER — LEVOFLOXACIN 500 MG/1
500 TABLET, FILM COATED ORAL DAILY
Qty: 7 TABLET | Refills: 0 | Status: SHIPPED | OUTPATIENT
Start: 2024-08-13 | End: 2024-08-20

## 2024-08-13 RX ADMIN — Medication 10 ML: at 08:44

## 2024-08-13 RX ADMIN — HEPARIN 500 UNITS: 100 SYRINGE at 08:45

## 2024-08-15 ENCOUNTER — HOSPITAL ENCOUNTER (OUTPATIENT)
Dept: CARDIOLOGY | Facility: HOSPITAL | Age: 64
Discharge: HOME | End: 2024-08-15
Admitting: INTERNAL MEDICINE
Payer: COMMERCIAL

## 2024-08-15 VITALS
HEIGHT: 64 IN | SYSTOLIC BLOOD PRESSURE: 106 MMHG | BODY MASS INDEX: 30.73 KG/M2 | DIASTOLIC BLOOD PRESSURE: 58 MMHG | WEIGHT: 180 LBS | HEART RATE: 68 BPM

## 2024-08-15 DIAGNOSIS — C78.00 CARCINOMA OF RIGHT BREAST METASTATIC TO LUNG: ICD-10-CM

## 2024-08-15 DIAGNOSIS — Z51.11 ENCOUNTER FOR ANTINEOPLASTIC CHEMOTHERAPY: ICD-10-CM

## 2024-08-15 DIAGNOSIS — C50.911 CARCINOMA OF RIGHT BREAST METASTATIC TO LUNG: ICD-10-CM

## 2024-08-15 LAB
AORTIC DIMENSIONLESS INDEX: 0.9 (DI)
BH CV ECHO LEFT VENTRICLE GLOBAL LONGITUDINAL STRAIN: 17.9 %
BH CV ECHO MEAS - ACS: 1.63 CM
BH CV ECHO MEAS - AO MAX PG: 4.5 MMHG
BH CV ECHO MEAS - AO MEAN PG: 2.48 MMHG
BH CV ECHO MEAS - AO ROOT DIAM: 3.2 CM
BH CV ECHO MEAS - AO V2 MAX: 105.9 CM/SEC
BH CV ECHO MEAS - AO V2 VTI: 16.5 CM
BH CV ECHO MEAS - AVA(I,D): 2.22 CM2
BH CV ECHO MEAS - EDV(CUBED): 60.4 ML
BH CV ECHO MEAS - EDV(MOD-SP2): 49.7 ML
BH CV ECHO MEAS - EDV(MOD-SP4): 58.4 ML
BH CV ECHO MEAS - EF(MOD-BP): 64 %
BH CV ECHO MEAS - EF(MOD-SP2): 65.9 %
BH CV ECHO MEAS - EF(MOD-SP4): 60.2 %
BH CV ECHO MEAS - ESV(CUBED): 13 ML
BH CV ECHO MEAS - ESV(MOD-SP2): 17 ML
BH CV ECHO MEAS - ESV(MOD-SP4): 23.2 ML
BH CV ECHO MEAS - FS: 40.1 %
BH CV ECHO MEAS - IVS/LVPW: 1.03 CM
BH CV ECHO MEAS - IVSD: 0.79 CM
BH CV ECHO MEAS - LA DIMENSION: 3.2 CM
BH CV ECHO MEAS - LV DIASTOLIC VOL/BSA (35-75): 31.2 CM2
BH CV ECHO MEAS - LV MASS(C)D: 87.1 GRAMS
BH CV ECHO MEAS - LV MAX PG: 3.3 MMHG
BH CV ECHO MEAS - LV MEAN PG: 1.86 MMHG
BH CV ECHO MEAS - LV SYSTOLIC VOL/BSA (12-30): 12.4 CM2
BH CV ECHO MEAS - LV V1 MAX: 90.5 CM/SEC
BH CV ECHO MEAS - LV V1 VTI: 14.9 CM
BH CV ECHO MEAS - LVIDD: 3.9 CM
BH CV ECHO MEAS - LVIDS: 2.35 CM
BH CV ECHO MEAS - LVOT AREA: 2.47 CM2
BH CV ECHO MEAS - LVOT DIAM: 1.77 CM
BH CV ECHO MEAS - LVPWD: 0.77 CM
BH CV ECHO MEAS - MV A MAX VEL: 76 CM/SEC
BH CV ECHO MEAS - MV DEC SLOPE: 421.2 CM/SEC2
BH CV ECHO MEAS - MV DEC TIME: 0.15 SEC
BH CV ECHO MEAS - MV E MAX VEL: 64.1 CM/SEC
BH CV ECHO MEAS - MV E/A: 0.84
BH CV ECHO MEAS - MV MAX PG: 2.43 MMHG
BH CV ECHO MEAS - MV MEAN PG: 1.16 MMHG
BH CV ECHO MEAS - MV V2 VTI: 13.9 CM
BH CV ECHO MEAS - MVA(VTI): 2.6 CM2
BH CV ECHO MEAS - PA ACC TIME: 0.09 SEC
BH CV ECHO MEAS - PA V2 MAX: 92.3 CM/SEC
BH CV ECHO MEAS - PI END-D VEL: 74.6 CM/SEC
BH CV ECHO MEAS - PULM A REVS DUR: 0.08 SEC
BH CV ECHO MEAS - PULM A REVS VEL: 31.7 CM/SEC
BH CV ECHO MEAS - PULM DIAS VEL: 37.9 CM/SEC
BH CV ECHO MEAS - PULM S/D: 1.54
BH CV ECHO MEAS - PULM SYS VEL: 58.5 CM/SEC
BH CV ECHO MEAS - QP/QS: 1
BH CV ECHO MEAS - RAP SYSTOLE: 3 MMHG
BH CV ECHO MEAS - RV MAX PG: 1.03 MMHG
BH CV ECHO MEAS - RV V1 MAX: 50.6 CM/SEC
BH CV ECHO MEAS - RV V1 VTI: 8.2 CM
BH CV ECHO MEAS - RVDD: 2.7 CM
BH CV ECHO MEAS - RVOT DIAM: 2.38 CM
BH CV ECHO MEAS - RVSP: 21.6 MMHG
BH CV ECHO MEAS - SV(LVOT): 36.7 ML
BH CV ECHO MEAS - SV(MOD-SP2): 32.7 ML
BH CV ECHO MEAS - SV(MOD-SP4): 35.2 ML
BH CV ECHO MEAS - SV(RVOT): 36.6 ML
BH CV ECHO MEAS - SVI(LVOT): 19.6 ML/M2
BH CV ECHO MEAS - SVI(MOD-SP2): 17.5 ML/M2
BH CV ECHO MEAS - SVI(MOD-SP4): 18.8 ML/M2
BH CV ECHO MEAS - TR MAX PG: 18.6 MMHG
BH CV ECHO MEAS - TR MAX VEL: 215.7 CM/SEC

## 2024-08-15 PROCEDURE — 93356 MYOCRD STRAIN IMG SPCKL TRCK: CPT

## 2024-08-15 PROCEDURE — 93306 TTE W/DOPPLER COMPLETE: CPT

## 2024-08-20 ENCOUNTER — HOSPITAL ENCOUNTER (OUTPATIENT)
Dept: ONCOLOGY | Facility: HOSPITAL | Age: 64
Discharge: HOME | End: 2024-08-20
Admitting: INTERNAL MEDICINE
Payer: COMMERCIAL

## 2024-08-20 DIAGNOSIS — D70.9 NEUTROPENIA, UNSPECIFIED TYPE: ICD-10-CM

## 2024-08-20 DIAGNOSIS — R91.8 LUNG MASS: Primary | ICD-10-CM

## 2024-08-20 LAB
BASOPHILS # BLD AUTO: 0.05 10*3/MM3 (ref 0–0.2)
BASOPHILS NFR BLD AUTO: 0.9 % (ref 0–1.5)
DEPRECATED RDW RBC AUTO: 56.4 FL (ref 37–54)
EOSINOPHIL # BLD AUTO: 0.03 10*3/MM3 (ref 0–0.4)
EOSINOPHIL NFR BLD AUTO: 0.6 % (ref 0.3–6.2)
ERYTHROCYTE [DISTWIDTH] IN BLOOD BY AUTOMATED COUNT: 18.3 % (ref 12.3–15.4)
HCT VFR BLD AUTO: 37.6 % (ref 34–46.6)
HGB BLD-MCNC: 11.9 G/DL (ref 12–15.9)
LYMPHOCYTES # BLD AUTO: 1.03 10*3/MM3 (ref 0.7–3.1)
LYMPHOCYTES NFR BLD AUTO: 19.2 % (ref 19.6–45.3)
MCH RBC QN AUTO: 27.5 PG (ref 26.6–33)
MCHC RBC AUTO-ENTMCNC: 31.6 G/DL (ref 31.5–35.7)
MCV RBC AUTO: 86.8 FL (ref 79–97)
MONOCYTES # BLD AUTO: 0.8 10*3/MM3 (ref 0.1–0.9)
MONOCYTES NFR BLD AUTO: 14.9 % (ref 5–12)
NEUTROPHILS NFR BLD AUTO: 3.46 10*3/MM3 (ref 1.7–7)
NEUTROPHILS NFR BLD AUTO: 64.4 % (ref 42.7–76)
PLATELET # BLD AUTO: 202 10*3/MM3 (ref 140–450)
PMV BLD AUTO: 8.6 FL (ref 6–12)
RBC # BLD AUTO: 4.33 10*6/MM3 (ref 3.77–5.28)
WBC NRBC COR # BLD AUTO: 5.37 10*3/MM3 (ref 3.4–10.8)

## 2024-08-20 PROCEDURE — 36415 COLL VENOUS BLD VENIPUNCTURE: CPT

## 2024-08-20 PROCEDURE — 85025 COMPLETE CBC W/AUTO DIFF WBC: CPT | Performed by: INTERNAL MEDICINE

## 2024-08-20 PROCEDURE — G0463 HOSPITAL OUTPT CLINIC VISIT: HCPCS

## 2024-08-20 RX ORDER — HEPARIN SODIUM (PORCINE) LOCK FLUSH IV SOLN 100 UNIT/ML 100 UNIT/ML
500 SOLUTION INTRAVENOUS AS NEEDED
Status: DISCONTINUED | OUTPATIENT
Start: 2024-08-20 | End: 2024-08-21 | Stop reason: HOSPADM

## 2024-08-20 RX ORDER — SODIUM CHLORIDE 0.9 % (FLUSH) 0.9 %
10 SYRINGE (ML) INJECTION AS NEEDED
Status: CANCELLED | OUTPATIENT
Start: 2024-08-20

## 2024-08-20 RX ORDER — SODIUM CHLORIDE 0.9 % (FLUSH) 0.9 %
10 SYRINGE (ML) INJECTION AS NEEDED
Status: DISCONTINUED | OUTPATIENT
Start: 2024-08-20 | End: 2024-08-21 | Stop reason: HOSPADM

## 2024-08-20 RX ORDER — HEPARIN SODIUM (PORCINE) LOCK FLUSH IV SOLN 100 UNIT/ML 100 UNIT/ML
500 SOLUTION INTRAVENOUS AS NEEDED
Status: CANCELLED | OUTPATIENT
Start: 2024-08-20

## 2024-08-20 NOTE — PROGRESS NOTES
Attempted to access right chest port, unable to access, bruise to port noted, patient in agreement to have cbc drawn peripherally. Cbc drawn from left hand, gauze and bandaid placed.  [facility]

## 2024-08-22 RX ORDER — LEVOFLOXACIN 500 MG/1
TABLET, FILM COATED ORAL
Qty: 7 TABLET | Refills: 0 | OUTPATIENT
Start: 2024-08-22

## 2024-08-26 ENCOUNTER — HOSPITAL ENCOUNTER (OUTPATIENT)
Dept: ONCOLOGY | Facility: HOSPITAL | Age: 64
Discharge: HOME | End: 2024-08-26
Admitting: INTERNAL MEDICINE
Payer: COMMERCIAL

## 2024-08-26 VITALS
HEART RATE: 98 BPM | RESPIRATION RATE: 16 BRPM | OXYGEN SATURATION: 96 % | DIASTOLIC BLOOD PRESSURE: 71 MMHG | TEMPERATURE: 97.4 F | WEIGHT: 181.8 LBS | HEIGHT: 64 IN | BODY MASS INDEX: 31.04 KG/M2 | SYSTOLIC BLOOD PRESSURE: 116 MMHG

## 2024-08-26 DIAGNOSIS — C50.911 INVASIVE DUCTAL CARCINOMA OF BREAST, FEMALE, RIGHT: Chronic | ICD-10-CM

## 2024-08-26 DIAGNOSIS — Z17.0 BILATERAL MALIGNANT NEOPLASM OF BREAST IN FEMALE, ESTROGEN RECEPTOR POSITIVE, UNSPECIFIED SITE OF BREAST: Primary | ICD-10-CM

## 2024-08-26 DIAGNOSIS — R91.8 LUNG MASS: ICD-10-CM

## 2024-08-26 DIAGNOSIS — R91.8 RIGHT LOWER LOBE LUNG MASS: ICD-10-CM

## 2024-08-26 DIAGNOSIS — R93.89 ABNORMAL CT OF THE CHEST: ICD-10-CM

## 2024-08-26 DIAGNOSIS — C50.911 BILATERAL MALIGNANT NEOPLASM OF BREAST IN FEMALE, ESTROGEN RECEPTOR POSITIVE, UNSPECIFIED SITE OF BREAST: Primary | ICD-10-CM

## 2024-08-26 DIAGNOSIS — C50.912 BILATERAL MALIGNANT NEOPLASM OF BREAST IN FEMALE, ESTROGEN RECEPTOR POSITIVE, UNSPECIFIED SITE OF BREAST: Primary | ICD-10-CM

## 2024-08-26 LAB
ALP BLD-CCNC: 67 U/L (ref 42–141)
BASOPHILS # BLD AUTO: 0.01 10*3/MM3 (ref 0–0.2)
BASOPHILS NFR BLD AUTO: 0.1 % (ref 0–1.5)
BUN BLDA-MCNC: 24 MG/DL (ref 7–22)
CALCIUM BLD QL: 9.9 MG/DL (ref 8–10.3)
CHLORIDE BLDA-SCNC: 104 MMOL/L (ref 98–108)
CO2 BLDA-SCNC: 25 MMOL/L (ref 18–33)
CREAT BLDA-MCNC: 1 MG/DL (ref 0.6–1.2)
DEPRECATED RDW RBC AUTO: 58.2 FL (ref 37–54)
EGFRCR SERPLBLD CKD-EPI 2021: 63.8 ML/MIN/1.73
EOSINOPHIL # BLD AUTO: 0 10*3/MM3 (ref 0–0.4)
EOSINOPHIL NFR BLD AUTO: 0 % (ref 0.3–6.2)
ERYTHROCYTE [DISTWIDTH] IN BLOOD BY AUTOMATED COUNT: 19 % (ref 12.3–15.4)
GLUCOSE BLDC GLUCOMTR-MCNC: 198 MG/DL (ref 73–118)
HCT VFR BLD AUTO: 37.3 % (ref 34–46.6)
HGB BLD-MCNC: 11.6 G/DL (ref 12–15.9)
LYMPHOCYTES # BLD AUTO: 0.72 10*3/MM3 (ref 0.7–3.1)
LYMPHOCYTES NFR BLD AUTO: 6.6 % (ref 19.6–45.3)
MCH RBC QN AUTO: 27.6 PG (ref 26.6–33)
MCHC RBC AUTO-ENTMCNC: 31.1 G/DL (ref 31.5–35.7)
MCV RBC AUTO: 88.6 FL (ref 79–97)
MONOCYTES # BLD AUTO: 0.14 10*3/MM3 (ref 0.1–0.9)
MONOCYTES NFR BLD AUTO: 1.3 % (ref 5–12)
NEUTROPHILS NFR BLD AUTO: 10.09 10*3/MM3 (ref 1.7–7)
NEUTROPHILS NFR BLD AUTO: 92 % (ref 42.7–76)
PLATELET # BLD AUTO: 213 10*3/MM3 (ref 140–450)
PMV BLD AUTO: 9.2 FL (ref 6–12)
POC ALBUMIN: 3.6 G/L (ref 3.3–5.5)
POC ALT (SGPT): 12 U/L (ref 10–47)
POC AST (SGOT): 23 U/L (ref 11–38)
POC TOTAL BILIRUBIN: 1 MG/DL (ref 0.2–1.6)
POC TOTAL PROTEIN: 6.8 G/DL (ref 6.4–8.1)
POTASSIUM BLDA-SCNC: 4.5 MMOL/L (ref 3.6–5.1)
RBC # BLD AUTO: 4.21 10*6/MM3 (ref 3.77–5.28)
SODIUM BLD-SCNC: 145 MMOL/L (ref 128–145)
WBC NRBC COR # BLD AUTO: 10.96 10*3/MM3 (ref 3.4–10.8)

## 2024-08-26 PROCEDURE — 25010000002 PEGFILGRASTIM 6 MG/0.6ML PREFILLED SYRINGE KIT: Performed by: PHYSICIAN ASSISTANT

## 2024-08-26 PROCEDURE — 96417 CHEMO IV INFUS EACH ADDL SEQ: CPT

## 2024-08-26 PROCEDURE — 25810000003 SODIUM CHLORIDE 0.9 % SOLUTION 250 ML FLEX CONT: Performed by: PHYSICIAN ASSISTANT

## 2024-08-26 PROCEDURE — 80053 COMPREHEN METABOLIC PANEL: CPT

## 2024-08-26 PROCEDURE — 85025 COMPLETE CBC W/AUTO DIFF WBC: CPT | Performed by: INTERNAL MEDICINE

## 2024-08-26 PROCEDURE — 25010000002 HEPARIN LOCK FLUSH PER 10 UNITS: Performed by: INTERNAL MEDICINE

## 2024-08-26 PROCEDURE — 96413 CHEMO IV INFUSION 1 HR: CPT

## 2024-08-26 PROCEDURE — 25010000002 DOCETAXEL 20 MG/ML SOLUTION 8 ML VIAL: Performed by: PHYSICIAN ASSISTANT

## 2024-08-26 PROCEDURE — 25010000002 TRASTUZUMAB-ANNS 420 MG RECONSTITUTED SOLUTION 1 EACH VIAL: Performed by: PHYSICIAN ASSISTANT

## 2024-08-26 PROCEDURE — 25010000002 PERTUZUMAB 420 MG/14ML SOLUTION 420 MG VIAL: Performed by: PHYSICIAN ASSISTANT

## 2024-08-26 PROCEDURE — 25810000003 SODIUM CHLORIDE 0.9 % SOLUTION: Performed by: PHYSICIAN ASSISTANT

## 2024-08-26 PROCEDURE — 96377 APPLICATON ON-BODY INJECTOR: CPT

## 2024-08-26 RX ORDER — SODIUM CHLORIDE 9 MG/ML
20 INJECTION, SOLUTION INTRAVENOUS ONCE
Status: COMPLETED | OUTPATIENT
Start: 2024-08-26 | End: 2024-08-26

## 2024-08-26 RX ORDER — SODIUM CHLORIDE 0.9 % (FLUSH) 0.9 %
10 SYRINGE (ML) INJECTION AS NEEDED
Status: CANCELLED | OUTPATIENT
Start: 2024-08-26

## 2024-08-26 RX ORDER — FAMOTIDINE 10 MG/ML
20 INJECTION, SOLUTION INTRAVENOUS AS NEEDED
Status: CANCELLED | OUTPATIENT
Start: 2024-08-26

## 2024-08-26 RX ORDER — SODIUM CHLORIDE 0.9 % (FLUSH) 0.9 %
10 SYRINGE (ML) INJECTION AS NEEDED
Status: DISCONTINUED | OUTPATIENT
Start: 2024-08-26 | End: 2024-08-27 | Stop reason: HOSPADM

## 2024-08-26 RX ORDER — DIPHENHYDRAMINE HYDROCHLORIDE 50 MG/ML
50 INJECTION INTRAMUSCULAR; INTRAVENOUS AS NEEDED
Status: CANCELLED | OUTPATIENT
Start: 2024-08-26

## 2024-08-26 RX ORDER — HEPARIN SODIUM (PORCINE) LOCK FLUSH IV SOLN 100 UNIT/ML 100 UNIT/ML
500 SOLUTION INTRAVENOUS AS NEEDED
Status: CANCELLED | OUTPATIENT
Start: 2024-08-26

## 2024-08-26 RX ORDER — HEPARIN SODIUM (PORCINE) LOCK FLUSH IV SOLN 100 UNIT/ML 100 UNIT/ML
500 SOLUTION INTRAVENOUS AS NEEDED
Status: DISCONTINUED | OUTPATIENT
Start: 2024-08-26 | End: 2024-08-27 | Stop reason: HOSPADM

## 2024-08-26 RX ADMIN — DOCETAXEL 145 MG: 20 INJECTION, SOLUTION, CONCENTRATE INTRAVENOUS at 11:52

## 2024-08-26 RX ADMIN — PERTUZUMAB 420 MG: 30 INJECTION, SOLUTION, CONCENTRATE INTRAVENOUS at 10:10

## 2024-08-26 RX ADMIN — TRASTUZUMAB-ANNS 510 MG: 420 INJECTION, POWDER, LYOPHILIZED, FOR SOLUTION INTRAVENOUS at 11:17

## 2024-08-26 RX ADMIN — Medication 10 ML: at 13:03

## 2024-08-26 RX ADMIN — HEPARIN 500 UNITS: 100 SYRINGE at 13:03

## 2024-08-26 RX ADMIN — PEGFILGRASTIM 6 MG: KIT SUBCUTANEOUS at 12:59

## 2024-08-26 RX ADMIN — SODIUM CHLORIDE 20 ML/HR: 9 INJECTION, SOLUTION INTRAVENOUS at 10:10

## 2024-08-26 NOTE — PROGRESS NOTES
Patient is here for C6D1 Perjeta, Kanjinti, Docetaxel. Port accessed and flushed with good blood return noted. 10cc of blood wasted prior to specimen collection. Blood specimen obtained and sent to lab for processing per protocol.  Port flushed with saline and heparin prior to needle removal. Patient stated she took her 2 steroids tablets twice yesterday and this morning. Dr. Nazario and Lindy ROSS PA-C sent: Patient is here for C6D1 perjeta, kanjinti and docetaxel. Patient stated the only new complaints were more nausea this cycle but it was controllable with zofran and she vomited water x 1 but after zofran was better. She stated her mouth and throat normally gets sore after treatment but she did get a mouth sore this time on her left cheek but stated it was almost healed now and causing no issues currently. she did baking soda rinses and used an OTC mouthwash. Patient stated she thought this was her last docetaxel dose and after this was starting maintenance perjeta and kanjiniti (herceptin) but there was not a plan in so I was double checking-I have a message out to whit. Her CBC is in parameters and TERRY results are in parameters and sent via secure chat. IF that is in parameters is she ok to treat today? Her plan does need to be signed and is she stating maintenance with perjeta and kanjinti after this cycle-just wanted to check so I can get appts scheduled? thank you 08/26/24  [ Day 1 ], Cycle 6  WBC: 10.96 (H)  Neutrophils Absolute: 10.09 (H)  Hemoglobin: 11.6 (L)  Hematocrit: 37.3  Platelets: 213   Lindy ROSS PA-C responded: Plan is signed. Per Dr. Nazario's note- Initial plan was to complete 6 cycles of treatment and discuss at tumor board regarding consolidation with surgery versus radiation. Will then continue Herceptin Perjeta maintenance. It looks like she needs PET imaging after cycle 6. So she likely needs to follow up with Dr. Nazario prior to additional treatments. Whit MARTINEZ RN and Stacey added to  secure chat as well as pharmacy. Whit stated she put in the PET scan. Stacey came and scheduled patient for her appts and whit added the PET scan order. Neulasta information was gone over with the patient and the hand out given. Patient verbalized understanding. Patient tolerated treatment and was discharged with AVS after port de-accessed.

## 2024-08-29 ENCOUNTER — HOSPITAL ENCOUNTER (OUTPATIENT)
Dept: ONCOLOGY | Facility: HOSPITAL | Age: 64
Discharge: HOME | End: 2024-08-29
Payer: COMMERCIAL

## 2024-08-29 VITALS
BODY MASS INDEX: 30.8 KG/M2 | HEART RATE: 83 BPM | OXYGEN SATURATION: 96 % | RESPIRATION RATE: 18 BRPM | WEIGHT: 180.4 LBS | DIASTOLIC BLOOD PRESSURE: 64 MMHG | HEIGHT: 64 IN | SYSTOLIC BLOOD PRESSURE: 98 MMHG | TEMPERATURE: 98.4 F

## 2024-08-29 DIAGNOSIS — R91.8 LUNG MASS: ICD-10-CM

## 2024-08-29 DIAGNOSIS — E86.0 DEHYDRATION: Primary | ICD-10-CM

## 2024-08-29 PROCEDURE — 96361 HYDRATE IV INFUSION ADD-ON: CPT

## 2024-08-29 PROCEDURE — 25010000002 ONDANSETRON PER 1 MG: Performed by: INTERNAL MEDICINE

## 2024-08-29 PROCEDURE — 25010000002 HEPARIN LOCK FLUSH PER 10 UNITS: Performed by: INTERNAL MEDICINE

## 2024-08-29 PROCEDURE — 25810000003 SODIUM CHLORIDE 0.9 % SOLUTION: Performed by: INTERNAL MEDICINE

## 2024-08-29 PROCEDURE — 96374 THER/PROPH/DIAG INJ IV PUSH: CPT

## 2024-08-29 RX ORDER — HEPARIN SODIUM (PORCINE) LOCK FLUSH IV SOLN 100 UNIT/ML 100 UNIT/ML
500 SOLUTION INTRAVENOUS AS NEEDED
Status: DISCONTINUED | OUTPATIENT
Start: 2024-08-29 | End: 2024-08-31 | Stop reason: HOSPADM

## 2024-08-29 RX ORDER — SODIUM CHLORIDE 0.9 % (FLUSH) 0.9 %
10 SYRINGE (ML) INJECTION AS NEEDED
OUTPATIENT
Start: 2024-08-29

## 2024-08-29 RX ORDER — HEPARIN SODIUM (PORCINE) LOCK FLUSH IV SOLN 100 UNIT/ML 100 UNIT/ML
500 SOLUTION INTRAVENOUS AS NEEDED
OUTPATIENT
Start: 2024-08-29

## 2024-08-29 RX ORDER — ONDANSETRON 2 MG/ML
8 INJECTION INTRAMUSCULAR; INTRAVENOUS ONCE
Status: COMPLETED | OUTPATIENT
Start: 2024-08-29 | End: 2024-08-29

## 2024-08-29 RX ORDER — SODIUM CHLORIDE 0.9 % (FLUSH) 0.9 %
10 SYRINGE (ML) INJECTION AS NEEDED
Status: DISCONTINUED | OUTPATIENT
Start: 2024-08-29 | End: 2024-08-31 | Stop reason: HOSPADM

## 2024-08-29 RX ADMIN — SODIUM CHLORIDE 1000 ML: 9 INJECTION, SOLUTION INTRAVENOUS at 13:26

## 2024-08-29 RX ADMIN — ONDANSETRON 8 MG: 2 INJECTION INTRAMUSCULAR; INTRAVENOUS at 13:31

## 2024-08-29 RX ADMIN — HEPARIN 500 UNITS: 100 SYRINGE at 14:35

## 2024-08-29 RX ADMIN — Medication 10 ML: at 14:35

## 2024-08-29 NOTE — PROGRESS NOTES
Pt in office today for IVF and IV zofran as planned [facility] infusaport accessed per protocol with a 20g 1.5 inch needle. Positive blood return. IVF and zofran administered as ordered. Pt denies problems issues with exception of fatigue,  pt reports feeling better after IVF/Zofran.  Pt discharged with spouse and has appts

## 2024-09-06 ENCOUNTER — HOSPITAL ENCOUNTER (OUTPATIENT)
Dept: PET IMAGING | Facility: HOSPITAL | Age: 64
Discharge: HOME | End: 2024-09-06
Payer: COMMERCIAL

## 2024-09-06 DIAGNOSIS — R91.8 RIGHT LOWER LOBE LUNG MASS: ICD-10-CM

## 2024-09-06 DIAGNOSIS — C50.911 INVASIVE DUCTAL CARCINOMA OF BREAST, FEMALE, RIGHT: Chronic | ICD-10-CM

## 2024-09-06 DIAGNOSIS — R93.89 ABNORMAL CT OF THE CHEST: ICD-10-CM

## 2024-09-06 LAB — GLUCOSE BLDC GLUCOMTR-MCNC: 99 MG/DL (ref 70–105)

## 2024-09-06 PROCEDURE — 0 FLUDEOXYGLUCOSE F18 SOLUTION: Performed by: INTERNAL MEDICINE

## 2024-09-06 PROCEDURE — 78815 PET IMAGE W/CT SKULL-THIGH: CPT

## 2024-09-06 PROCEDURE — A9552 F18 FDG: HCPCS | Performed by: INTERNAL MEDICINE

## 2024-09-06 PROCEDURE — 82948 REAGENT STRIP/BLOOD GLUCOSE: CPT

## 2024-09-06 RX ADMIN — FLUDEOXYGLUCOSE F 18 1 DOSE: 200 INJECTION, SOLUTION INTRAVENOUS at 09:05

## 2024-09-10 NOTE — PROGRESS NOTES
HEMATOLOGY ONCOLOGY OUTPATIENT FOLLOW UP       Patient name: Tracy Reddy  : 1960  MRN: [id number]  Primary Care Physician: Provider, No Known  Referring Physician: Provider, No Known  Reason For Consult: breast cancer    History of Present Illness:    Tracy Reddy is a 63 y.o. female who presented to [facility] on 4/3/2024 with complaints of cough.  She initially presented to [facility] ED with complaints of fever up to 103 °F, productive cough with green and brown sputum, shortness of air and chest tightness that has been ongoing for several days.  She reports she has family members that have been sick at home.  She reports she has also had a history of PE in the past.  Labs show troponin was 13 x 2, .8.  CBC unremarkable.  Respiratory panel was negative. Chest x-ray showed masslike opacity within the right lower lobe.  Sputum cultures and blood cultures have been ordered.  She is a non-smoker.  Pulmonology also has been consulted.     24 CT chest angiogram  Impression:  1. No evidence of pulmonary embolism.  2. No acute cardiopulmonary process. There is a mass present within the right lower lobe likely representing malignancy. There is enlarged right hilar adenopathy likely representing local metastatic disease. No previous imaging available for comparison.        24  Hematology/Oncology was consulted.       From record review: Patient has a history of bilateral ductal carcinoma diagnosed in .  Right breast IDC, G1, ER positive, UT positive, HER-2 negative. Left breast IDC, G2, ER positive, UT positive, HER-2 negative. Left SLND revealed 1 of 2 lymph nodes positive for macro metastasis. She is status post bilateral mastectomy with reconstruction in .  She is also completed neoadjuvant chemotherapy with 6 cycles of TCHP.  Taxotere and carboplatin with dose reductions at cycle 2 secondary to chemotherapy-induced nausea and vomiting.  Completed adjuvant  Herceptin Perjeta for 1 year.  Status post radiation therapy left chest wall and regional nodes, 5 fractions for a total dose of 5000 cGray. Current treatment with hormone blockade with Arimidex.  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  She follows at [facility], reviewed most recent office note from 3/11/2024.     4/5/2024 status post bronchoscopy.  EBUS guided FNA of the right lower lobe hilar mass confirmed metastatic ductal carcinoma of the breast ER +100% DE +10%, HER2 nu pending  Respiratory panel positive for human metapneumovirus.    4/12/2024 PET/CT with 1.1 cm lesion in the region of the left vallecula concerning for second primary malignancy.  Recommend correlation with direct visualization, large right hilar mass consistent with malignancy, no evidence of metastatic disease elsewhere    Path with HER 2 positive 3+ by IHC, guardant NGS with PIK3CA     5/13/24 - started THP  6/3/24 - C2  6/24/24 - C3  7/15/2024: Patient received cycle 4 chemotherapy with Taxotere Herceptin Perjeta  8/5/2024: Patient received cycle 5 Taxotere Herceptin Perjeta      7/12/24 - CT imaging - There is decrease in size of the right hilar mass compared to the prior examination. This now measures approximately 3.0 x 1.5 cm, previously measured 4.2 x 3.4 cm using similar measurement technique.     -9/6/2024 patient had a PET CT scan there is a dramatic interval decrease in the size of the right lower lobe infrahilar mass when compared to previous PET scan.  There is no longer any visible mass or abnormal metabolic activity in this location.  There may be residual scarring in that location.  There is an uptake within the right paratracheal lymph node measuring 1.4 cm SUV 5.6 suggesting metastatic disease previously measured 0.8 cm.  No additional areas of abnormal metabolic activity was seen.  No other evidence of metastatic disease was seen     Subjective:    Continues to tolerate treatment well, has had  some rash.    Patient is here today for follow-up.    Patient is here today for follow-up and does not have any new issues    Past Medical History:   Diagnosis Date    Cancer        Past Surgical History:   Procedure Laterality Date    BRONCHOSCOPY N/A 4/5/2024    Procedure: BRONCHOSCOPY WITH ENDOBRONCHIAL ULTRASOUND, bilateral bronchial washings, fine needle aspiration x 2 areas;  Surgeon: Edy Irving MD;  Location: [facility] ENDOSCOPY;  Service: Pulmonary;  Laterality: N/A;  post: lung mass    MASTECTOMY           Current Outpatient Medications:     Calcium Citrate-Vitamin D (Citrus Calcium/Vitamin D) 200-6.25 MG-MCG tablet, Take  by mouth., Disp: , Rfl:     cetirizine (zyrTEC) 10 MG tablet, Take 1 tablet by mouth Daily., Disp: , Rfl:     dexAMETHasone (DECADRON) 4 MG tablet, Take 2 tablets oral twice a day for 3 consecutive days beginning the day before chemotherapy and continue for 6 doses., Disp: 12 tablet, Rfl: 3    omeprazole (priLOSEC) 20 MG capsule, Take 1 capsule by mouth Daily., Disp: 30 capsule, Rfl: 5    ondansetron (ZOFRAN) 8 MG tablet, Take 1 tablet by mouth 3 (Three) Times a Day As Needed for Nausea or Vomiting., Disp: 30 tablet, Rfl: 5    promethazine (PHENERGAN) 12.5 MG tablet, Take 1 tablet by mouth Every 6 (Six) Hours As Needed for Nausea or Vomiting., Disp: 30 tablet, Rfl: 5  No current facility-administered medications for this visit.    Facility-Administered Medications Ordered in Other Visits:     heparin injection 500 Units, 500 Units, Intravenous, PRNJustina Amitoj, MD    sodium chloride 0.9 % flush 10 mL, 10 mL, Intravenous, PRN, Pippa Horn MD    Allergies   Allergen Reactions    Penicillins Rash       No family history on file.    Cancer-related family history is not on file.    Social History     Tobacco Use    Smoking status: Never     Passive exposure: Never    Smokeless tobacco: Never   Vaping Use    Vaping status: Never Used   Substance Use Topics    Alcohol use: Never    Drug use:  "Never     Social History     Social History Narrative    Not on file      ROS:   Review of Systems   Constitutional:  Negative for chills, fatigue and fever.   HENT:  Negative for congestion and nosebleeds.    Eyes:  Negative for pain and itching.   Respiratory:  Negative for cough and shortness of breath.    Cardiovascular:  Negative for chest pain.   Gastrointestinal:  Negative for abdominal pain, blood in stool, diarrhea, nausea and vomiting.   Endocrine: Negative for cold intolerance and heat intolerance.   Genitourinary:  Negative for difficulty urinating.   Musculoskeletal:  Negative for arthralgias.   Skin:  Negative for rash.   Neurological:  Negative for dizziness and headaches.   Hematological:  Does not bruise/bleed easily.   Psychiatric/Behavioral:  Negative for behavioral problems.        Objective:  Vital signs:  Vitals:    09/11/24 0951   BP: 103/73   Pulse: 106   Resp: 18   Temp: 98.2 °F (36.8 °C)   TempSrc: Infrared   SpO2: 97%   Weight: 82.1 kg (181 lb)   Height: 162.6 cm (64\")   PainSc: 0-No pain             Body mass index is 31.07 kg/m².  ECOG  (0) Fully active, able to carry on all predisease performance without restriction    Physical Exam:   Physical Exam  Constitutional:       Appearance: Normal appearance.   HENT:      Head: Normocephalic and atraumatic.   Eyes:      Pupils: Pupils are equal, round, and reactive to light.   Cardiovascular:      Rate and Rhythm: Normal rate and regular rhythm.      Pulses: Normal pulses.      Heart sounds: Normal heart sounds. No murmur heard.  Pulmonary:      Effort: Pulmonary effort is normal.      Breath sounds: Normal breath sounds.   Abdominal:      General: There is no distension.      Palpations: Abdomen is soft. There is no mass.      Tenderness: There is no abdominal tenderness.   Musculoskeletal:         General: Normal range of motion.      Cervical back: Normal range of motion and neck supple.   Skin:     General: Skin is warm.   Neurological:    " "  General: No focal deficit present.      Mental Status: She is alert.   Psychiatric:         Mood and Affect: Mood normal.     I have reexamined the patient and the results are consistent with the previously documented exam. Cadence Nazario MD      Lab Results - Last 18 Months   Lab Units 09/11/24  0940 08/26/24  0823 08/20/24  1039   WBC 10*3/mm3 7.36 10.96* 5.37   HEMOGLOBIN g/dL 11.4* 11.6* 11.9*   HEMATOCRIT % 37.5 37.3 37.6   PLATELETS 10*3/mm3 191 213 202   MCV fL 90.4 88.6 86.8     Lab Results - Last 18 Months   Lab Units 08/26/24  0900 08/05/24  0924 07/15/24  0924 05/13/24  0909 05/13/24  0835 04/05/24  0415 04/04/24  0606 04/03/24  2251   SODIUM mmol/L  --   --   --   --  141 140 138 134*   POTASSIUM mmol/L  --   --   --   --  4.1 4.2 4.0 3.9   CHLORIDE mmol/L  --   --   --   --  105 104 103 102   CO2 mmol/L  --   --   --   --  22.0 23.0 24.0 22.1   BUN mg/dL  --   --   --   --  21 11 16 19   CREATININE mg/dL 1.00 0.80 1.00   < > 0.95 0.89 1.01* 1.05*   CALCIUM mg/dL  --   --   --   --  9.7 9.2 8.9 9.5   BILIRUBIN mg/dL  --   --   --   --  0.5  --   --  0.6   ALK PHOS U/L  --   --   --   --  92  --   --  80   ALT (SGPT) U/L  --   --   --   --  10  --   --  15   AST (SGOT) U/L  --   --   --   --  17  --   --  22   GLUCOSE mg/dL  --   --   --   --  192* 94 125* 131*    < > = values in this interval not displayed.       Lab Results   Component Value Date    GLUCOSE 192 (H) 05/13/2024    BUN 21 05/13/2024    CREATININE 1.00 08/26/2024    EGFRIFNONA 57 (L) 11/10/2021    EGFRIFAFRI 66 11/10/2021    BCR 22.1 05/13/2024    K 4.1 05/13/2024    CO2 22.0 05/13/2024    CALCIUM 9.7 05/13/2024    ALBUMIN 4.3 05/13/2024    AST 17 05/13/2024    ALT 10 05/13/2024       Lab Results - Last 18 Months   Lab Units 04/05/24  0415   INR  0.99       No results found for: \"IRON\", \"TIBC\", \"FERRITIN\"    No results found for: \"FOLATE\"    No results found for: \"OCCULTBLD\"    No results found for: \"RETICCTPCT\"  No results found " "for: \"[id number]\"  No results found for: \"SPEP\", \"UPEP\"  No results found for: \"LDH\", \"URICACID\"  No results found for: \"BABAK\", \"RF\", \"SEDRATE\"  No results found for: \"FIBRINOGEN\", \"HAPTOGLOBIN\"  Lab Results   Component Value Date    INR 0.99 04/05/2024     No results found for: \"\"  No results found for: \"CEA\"  No components found for: \"CA-19-9\"  No results found for: \"PSA\"  No results found for: \"SEDRATE\"    Assessment & Plan     Tracy Reddy is a 63 y.o. female with history of pulmonary embolism and bilateral invasive ductal carcinoma status post bilateral mastectomy, chemotherapy and radiation treatments, on Arimidex.  She presented with dyspnea and was found to have a lung mass in the right lower lobe with right hilar and suprahilar lymphadenopathy.     Right lung mass-metastatic ductal carcinoma of the breast -recurrent, ER/KY positive HER2/nancy 3+ on immunohistochemistry    CT imaging showed mass present within the medial right lower lobe measuring up to 3.2 x 4.1 cm that extends to the right hilar region.  Additional conglomerate of nodes present within right hilar to right suprahilar region measuring up to 2.7 x 1.8 cm.  Status post bronchoscopy EBUS 4/5/2024.    Biopsy confirms metastatic ductal carcinoma of the breast ER/KY positive, HER2 nu pending.  Further treatment will depend on HER2/nancy status.  Will also send tumor sample out for [employer] NGS testing.  She is on Arimidex right now but will need to switch to an alternate AI or a different agent given mutational analysis findings.  If HER2 positive disease, will use HER2 blockade.  Given there is no other clear areas of metastasis.  We can still consider curative treatment with either radio-ablation or surgical resection.    Discussed at tumor board. Not a surgical candidate based on tumor location. Radiation possible. Will plan on starting chemo with herceptin perjeta and then consider radiation after 6 cycles.    NGS testing with guardant " showing PIK3CA.  See report    Will then continue herceptin perjeta maintenance    Started treatment tolerating well has some side effects as above not significant to need any dose modification  CT imaging now with good response. Reviewed images independently and showed the patient. Reviewed labs, and notes from other providers.ordered labs    Our plan would be to continue 3 more cycles of Taxotere, Herceptin and Perjeta and then discuss at tumor board again about consolidation with surgery vs radiation. Initial thought was that radiation would be preferable given location.  Continue treatment no changes.    She has completed planned 6 cycles of Taxotere, Herceptin and Perjeta    Resume AI after chemotherapy can start letrozole at that point.    Will begin letrozole 2.5 mg p.o. daily as well as continue Herceptin Perjeta IV  Has been with vitamin D twice a day    Refer to Dr. Armenta for 1.4 cm right paratracheal node: Any role for surgery?  If no role for surgery then referred to Rad onc.  She has no other sites of metastatic disease    Insomnia  - complains more with steroids  Takes melatonin  Continue the same      History of bilateral invasive ductal carcinoma  -Diagnosed in 2020. She is status post bilateral mastectomy with reconstruction in 2021.  She is also completed neoadjuvant chemotherapy with 6 cycles of TCHP.  Taxotere and carboplatin with dose reductions at cycle 2 secondary to chemotherapy-induced nausea and vomiting.  There is some confusion about her pathology as some reports show HER2 negative, some show positive.  However she did receive adjuvant Herceptin Perjeta maintenance which likely points towards HER2 positive disease.  She is also known to have CHEK2 mutation.  Completed adjuvant Herceptin Perjeta for 1 year.  Status post radiation therapy left chest wall and regional nodes, 5 fractions for a total dose of 5000 cGray. Current treatment with hormone blockade with Arimidex. She followed at [facility] has transferred care to us now         Pneumonia  Resolved  Lingering cough now    Osteopenia  DEXA with osteopenia. continue calcium vitamin D.    Rash  Likely herceptin related  Lasted for a week   Recommend hydrocortisone topical   Rash has improved    Neutropenia due to chemotherapy    Neutropenic precautions  Add Neulasta with cycle #6  Empiric antibiotics with Levaquin    Diarrhea   Mild      Needs 2 D echo q 3 months for her 2 directed therapy    Next 2D echo is due 11/15/24    Continue treatment  On chemotherapy requiring intensive monitoring.  Follow-up in 3 weeks    Reviewed PET scan with patient in detail      Schedule port dye study      Follow-up 4 weeks or earlier as needed        I spent 40 total minutes, face-to-face, caring for Tracy today. 90% of this time involved counseling and/or coordination of care as documented within this note.

## 2024-09-11 ENCOUNTER — OFFICE VISIT (OUTPATIENT)
Dept: ONCOLOGY | Facility: CLINIC | Age: 64
End: 2024-09-11
Payer: COMMERCIAL

## 2024-09-11 ENCOUNTER — PATIENT OUTREACH (OUTPATIENT)
Dept: ONCOLOGY | Facility: CLINIC | Age: 64
End: 2024-09-11
Payer: COMMERCIAL

## 2024-09-11 ENCOUNTER — HOSPITAL ENCOUNTER (OUTPATIENT)
Dept: ONCOLOGY | Facility: HOSPITAL | Age: 64
Discharge: HOME | End: 2024-09-11
Payer: COMMERCIAL

## 2024-09-11 VITALS
TEMPERATURE: 98.2 F | WEIGHT: 181 LBS | OXYGEN SATURATION: 97 % | HEART RATE: 106 BPM | RESPIRATION RATE: 18 BRPM | SYSTOLIC BLOOD PRESSURE: 103 MMHG | DIASTOLIC BLOOD PRESSURE: 73 MMHG | BODY MASS INDEX: 30.9 KG/M2 | HEIGHT: 64 IN

## 2024-09-11 DIAGNOSIS — Z17.0 BILATERAL MALIGNANT NEOPLASM OF BREAST IN FEMALE, ESTROGEN RECEPTOR POSITIVE, UNSPECIFIED SITE OF BREAST: Primary | ICD-10-CM

## 2024-09-11 DIAGNOSIS — C50.911 BILATERAL MALIGNANT NEOPLASM OF BREAST IN FEMALE, ESTROGEN RECEPTOR POSITIVE, UNSPECIFIED SITE OF BREAST: ICD-10-CM

## 2024-09-11 DIAGNOSIS — C50.911 BILATERAL MALIGNANT NEOPLASM OF BREAST IN FEMALE, ESTROGEN RECEPTOR POSITIVE, UNSPECIFIED SITE OF BREAST: Primary | ICD-10-CM

## 2024-09-11 DIAGNOSIS — C50.912 BILATERAL MALIGNANT NEOPLASM OF BREAST IN FEMALE, ESTROGEN RECEPTOR POSITIVE, UNSPECIFIED SITE OF BREAST: ICD-10-CM

## 2024-09-11 DIAGNOSIS — Z17.0 BILATERAL MALIGNANT NEOPLASM OF BREAST IN FEMALE, ESTROGEN RECEPTOR POSITIVE, UNSPECIFIED SITE OF BREAST: ICD-10-CM

## 2024-09-11 DIAGNOSIS — R91.8 LUNG MASS: Primary | ICD-10-CM

## 2024-09-11 DIAGNOSIS — C50.912 BILATERAL MALIGNANT NEOPLASM OF BREAST IN FEMALE, ESTROGEN RECEPTOR POSITIVE, UNSPECIFIED SITE OF BREAST: Primary | ICD-10-CM

## 2024-09-11 DIAGNOSIS — Z95.828 PORT-A-CATH IN PLACE: ICD-10-CM

## 2024-09-11 PROBLEM — Z45.2 ENCOUNTER FOR CARE RELATED TO VASCULAR ACCESS PORT: Status: ACTIVE | Noted: 2024-09-11

## 2024-09-11 LAB
BASOPHILS # BLD AUTO: 0.1 10*3/MM3 (ref 0–0.2)
BASOPHILS NFR BLD AUTO: 1.4 % (ref 0–1.5)
DEPRECATED RDW RBC AUTO: 64.8 FL (ref 37–54)
EOSINOPHIL # BLD AUTO: 0.02 10*3/MM3 (ref 0–0.4)
EOSINOPHIL NFR BLD AUTO: 0.3 % (ref 0.3–6.2)
ERYTHROCYTE [DISTWIDTH] IN BLOOD BY AUTOMATED COUNT: 20 % (ref 12.3–15.4)
HCT VFR BLD AUTO: 37.5 % (ref 34–46.6)
HGB BLD-MCNC: 11.4 G/DL (ref 12–15.9)
LYMPHOCYTES # BLD AUTO: 1.08 10*3/MM3 (ref 0.7–3.1)
LYMPHOCYTES NFR BLD AUTO: 14.7 % (ref 19.6–45.3)
MCH RBC QN AUTO: 27.5 PG (ref 26.6–33)
MCHC RBC AUTO-ENTMCNC: 30.4 G/DL (ref 31.5–35.7)
MCV RBC AUTO: 90.4 FL (ref 79–97)
MONOCYTES # BLD AUTO: 0.75 10*3/MM3 (ref 0.1–0.9)
MONOCYTES NFR BLD AUTO: 10.2 % (ref 5–12)
NEUTROPHILS NFR BLD AUTO: 5.41 10*3/MM3 (ref 1.7–7)
NEUTROPHILS NFR BLD AUTO: 73.4 % (ref 42.7–76)
PLATELET # BLD AUTO: 191 10*3/MM3 (ref 140–450)
PMV BLD AUTO: 9.6 FL (ref 6–12)
RBC # BLD AUTO: 4.15 10*6/MM3 (ref 3.77–5.28)
WBC NRBC COR # BLD AUTO: 7.36 10*3/MM3 (ref 3.4–10.8)

## 2024-09-11 PROCEDURE — 99215 OFFICE O/P EST HI 40 MIN: CPT | Performed by: INTERNAL MEDICINE

## 2024-09-11 PROCEDURE — 85025 COMPLETE CBC W/AUTO DIFF WBC: CPT | Performed by: INTERNAL MEDICINE

## 2024-09-11 PROCEDURE — 25010000002 HEPARIN LOCK FLUSH PER 10 UNITS: Performed by: INTERNAL MEDICINE

## 2024-09-11 PROCEDURE — G0463 HOSPITAL OUTPT CLINIC VISIT: HCPCS

## 2024-09-11 RX ORDER — SODIUM CHLORIDE 0.9 % (FLUSH) 0.9 %
10 SYRINGE (ML) INJECTION AS NEEDED
OUTPATIENT
Start: 2024-09-11

## 2024-09-11 RX ORDER — HEPARIN SODIUM (PORCINE) LOCK FLUSH IV SOLN 100 UNIT/ML 100 UNIT/ML
500 SOLUTION INTRAVENOUS AS NEEDED
OUTPATIENT
Start: 2024-09-11

## 2024-09-11 RX ORDER — CALCIUM CARBONATE/VITAMIN D3 500MG-5MCG
1 TABLET ORAL 2 TIMES DAILY
Qty: 60 TABLET | Refills: 6 | Status: SHIPPED | OUTPATIENT
Start: 2024-09-11

## 2024-09-11 RX ORDER — HEPARIN SODIUM (PORCINE) LOCK FLUSH IV SOLN 100 UNIT/ML 100 UNIT/ML
500 SOLUTION INTRAVENOUS AS NEEDED
Status: DISCONTINUED | OUTPATIENT
Start: 2024-09-11 | End: 2024-09-12 | Stop reason: HOSPADM

## 2024-09-11 RX ORDER — LETROZOLE 2.5 MG/1
2.5 TABLET, FILM COATED ORAL DAILY
Qty: 30 TABLET | Refills: 6 | Status: SHIPPED | OUTPATIENT
Start: 2024-09-11

## 2024-09-11 RX ORDER — SODIUM CHLORIDE 0.9 % (FLUSH) 0.9 %
10 SYRINGE (ML) INJECTION AS NEEDED
Status: DISCONTINUED | OUTPATIENT
Start: 2024-09-11 | End: 2024-09-12 | Stop reason: HOSPADM

## 2024-09-11 RX ADMIN — HEPARIN 500 UNITS: 100 SYRINGE at 11:08

## 2024-09-11 RX ADMIN — Medication 10 ML: at 09:42

## 2024-09-11 RX ADMIN — Medication 10 ML: at 09:34

## 2024-09-11 NOTE — PROGRESS NOTES
Patient returned to me per   Gabriela DE LEON and I was informed to remove Dominguez needle and I complied. Patient gave AVS per M.D. staff.

## 2024-09-11 NOTE — PROGRESS NOTES
Port accessed and flushed with no blood return noted. Labs drawn per lab staff from patients left arm.Port flushed with saline and notified  and Marilyn NOE awaiting further orders Dominguez Needle will  remain in place during M.D. visit.

## 2024-09-16 ENCOUNTER — HOSPITAL ENCOUNTER (OUTPATIENT)
Dept: INTERVENTIONAL RADIOLOGY/VASCULAR | Facility: HOSPITAL | Age: 64
Discharge: HOME | End: 2024-09-16
Admitting: INTERNAL MEDICINE
Payer: COMMERCIAL

## 2024-09-16 VITALS — WEIGHT: 180 LBS | BODY MASS INDEX: 30.73 KG/M2 | HEIGHT: 64 IN

## 2024-09-16 DIAGNOSIS — Z95.828 PORT-A-CATH IN PLACE: ICD-10-CM

## 2024-09-16 PROCEDURE — 25010000002 ALTEPLASE 2 MG RECONSTITUTED SOLUTION: Performed by: RADIOLOGY

## 2024-09-16 PROCEDURE — 36598 INJ W/FLUOR EVAL CV DEVICE: CPT

## 2024-09-16 PROCEDURE — 25510000001 IOPAMIDOL PER 1 ML: Performed by: INTERNAL MEDICINE

## 2024-09-16 PROCEDURE — 36593 DECLOT VASCULAR DEVICE: CPT

## 2024-09-16 PROCEDURE — 25010000002 HEPARIN LOCK FLUSH PER 10 UNITS: Performed by: RADIOLOGY

## 2024-09-16 RX ORDER — IOPAMIDOL 755 MG/ML
100 INJECTION, SOLUTION INTRAVASCULAR
Status: COMPLETED | OUTPATIENT
Start: 2024-09-16 | End: 2024-09-16

## 2024-09-16 RX ORDER — HEPARIN SODIUM (PORCINE) LOCK FLUSH IV SOLN 100 UNIT/ML 100 UNIT/ML
SOLUTION INTRAVENOUS AS NEEDED
Status: COMPLETED | OUTPATIENT
Start: 2024-09-16 | End: 2024-09-16

## 2024-09-16 RX ADMIN — ALTEPLASE 4 MG: 2.2 INJECTION, POWDER, LYOPHILIZED, FOR SOLUTION INTRAVENOUS at 14:45

## 2024-09-16 RX ADMIN — IOPAMIDOL 10 ML: 755 INJECTION, SOLUTION INTRAVENOUS at 14:39

## 2024-09-16 RX ADMIN — Medication 500 UNITS: at 15:33

## 2024-09-17 ENCOUNTER — OFFICE VISIT (OUTPATIENT)
Dept: SURGERY | Facility: CLINIC | Age: 64
End: 2024-09-17
Payer: COMMERCIAL

## 2024-09-17 VITALS
DIASTOLIC BLOOD PRESSURE: 60 MMHG | BODY MASS INDEX: 30.92 KG/M2 | HEART RATE: 112 BPM | HEIGHT: 64 IN | WEIGHT: 181.1 LBS | SYSTOLIC BLOOD PRESSURE: 112 MMHG | OXYGEN SATURATION: 96 %

## 2024-09-17 DIAGNOSIS — C50.912 INVASIVE DUCTAL CARCINOMA OF BREAST, FEMALE, LEFT: Primary | Chronic | ICD-10-CM

## 2024-09-20 ENCOUNTER — PATIENT ROUNDING (BHMG ONLY) (OUTPATIENT)
Dept: SURGERY | Facility: CLINIC | Age: 64
End: 2024-09-20
Payer: COMMERCIAL

## 2024-09-20 ENCOUNTER — TELEPHONE (OUTPATIENT)
Dept: ONCOLOGY | Facility: CLINIC | Age: 64
End: 2024-09-20

## 2024-09-20 NOTE — TELEPHONE ENCOUNTER
"  Caller: Tracy Reddy \"Sarika\"    Relationship: Self    Best call back number: [phone]    What is the best time to reach you: ANY    Who are you requesting to speak with (clinical staff, provider,  specific staff member): SCHEDULING     What was the call regarding: SARIKA WAS IN ON 9-11 AND HAVING MORE TREATMENTS WERE DICUSSED THAT DAY    THERE HAS NOT BEEN ANY TREATMENTS SCHEDULED AND SHE WOULD LIKE A CALL BACK TO GET THEM SCHEDULED    ALSO SHE IS NEEDING A FOLLOW UP WITH DR SEVERINO AFTER THE TUMOR BOARD WHICH IS SET FOR 10-2       PLEASE ADVISE       "

## 2024-09-23 DIAGNOSIS — C50.912 BILATERAL MALIGNANT NEOPLASM OF BREAST IN FEMALE, ESTROGEN RECEPTOR POSITIVE, UNSPECIFIED SITE OF BREAST: Primary | ICD-10-CM

## 2024-09-23 DIAGNOSIS — Z17.0 BILATERAL MALIGNANT NEOPLASM OF BREAST IN FEMALE, ESTROGEN RECEPTOR POSITIVE, UNSPECIFIED SITE OF BREAST: Primary | ICD-10-CM

## 2024-09-23 DIAGNOSIS — C50.911 BILATERAL MALIGNANT NEOPLASM OF BREAST IN FEMALE, ESTROGEN RECEPTOR POSITIVE, UNSPECIFIED SITE OF BREAST: Primary | ICD-10-CM

## 2024-09-23 DIAGNOSIS — Z51.11 ENCOUNTER FOR ANTINEOPLASTIC CHEMOTHERAPY: ICD-10-CM

## 2024-09-23 RX ORDER — ONDANSETRON 8 MG/1
8 TABLET, FILM COATED ORAL 3 TIMES DAILY PRN
Qty: 30 TABLET | Refills: 5 | Status: SHIPPED | OUTPATIENT
Start: 2024-09-23

## 2024-09-24 ENCOUNTER — HOSPITAL ENCOUNTER (OUTPATIENT)
Dept: ONCOLOGY | Facility: HOSPITAL | Age: 64
Discharge: HOME | End: 2024-09-24
Admitting: INTERNAL MEDICINE
Payer: COMMERCIAL

## 2024-09-24 ENCOUNTER — APPOINTMENT (OUTPATIENT)
Dept: PHARMACY | Facility: HOSPITAL | Age: 64
End: 2024-09-24
Payer: COMMERCIAL

## 2024-09-24 ENCOUNTER — HOSPITAL ENCOUNTER (OUTPATIENT)
Dept: RADIATION ONCOLOGY | Facility: HOSPITAL | Age: 64
Setting detail: RADIATION/ONCOLOGY SERIES
End: 2024-09-24
Payer: COMMERCIAL

## 2024-09-24 ENCOUNTER — CONSULT (OUTPATIENT)
Dept: RADIATION ONCOLOGY | Facility: HOSPITAL | Age: 64
End: 2024-09-24
Payer: COMMERCIAL

## 2024-09-24 VITALS
BODY MASS INDEX: 31.07 KG/M2 | RESPIRATION RATE: 18 BRPM | DIASTOLIC BLOOD PRESSURE: 67 MMHG | SYSTOLIC BLOOD PRESSURE: 97 MMHG | HEART RATE: 106 BPM | OXYGEN SATURATION: 95 % | WEIGHT: 181 LBS

## 2024-09-24 VITALS
DIASTOLIC BLOOD PRESSURE: 67 MMHG | RESPIRATION RATE: 18 BRPM | WEIGHT: 181.4 LBS | HEART RATE: 106 BPM | OXYGEN SATURATION: 95 % | HEIGHT: 64 IN | SYSTOLIC BLOOD PRESSURE: 97 MMHG | TEMPERATURE: 97.5 F | BODY MASS INDEX: 30.97 KG/M2

## 2024-09-24 DIAGNOSIS — C50.912 BILATERAL MALIGNANT NEOPLASM OF BREAST IN FEMALE, ESTROGEN RECEPTOR POSITIVE, UNSPECIFIED SITE OF BREAST: Primary | ICD-10-CM

## 2024-09-24 DIAGNOSIS — Z17.0 BILATERAL MALIGNANT NEOPLASM OF BREAST IN FEMALE, ESTROGEN RECEPTOR POSITIVE, UNSPECIFIED SITE OF BREAST: Primary | ICD-10-CM

## 2024-09-24 DIAGNOSIS — C50.911 BILATERAL MALIGNANT NEOPLASM OF BREAST IN FEMALE, ESTROGEN RECEPTOR POSITIVE, UNSPECIFIED SITE OF BREAST: Primary | ICD-10-CM

## 2024-09-24 DIAGNOSIS — R91.8 LUNG MASS: ICD-10-CM

## 2024-09-24 DIAGNOSIS — C50.911 BREAST CANCER METASTASIZED TO INTRATHORACIC LYMPH NODE, RIGHT: Primary | ICD-10-CM

## 2024-09-24 DIAGNOSIS — C77.1 BREAST CANCER METASTASIZED TO INTRATHORACIC LYMPH NODE, RIGHT: Primary | ICD-10-CM

## 2024-09-24 LAB
ALBUMIN SERPL-MCNC: 3.8 G/DL (ref 3.5–5.2)
ALBUMIN/GLOB SERPL: 1.4 G/DL
ALP SERPL-CCNC: 82 U/L (ref 39–117)
ALT SERPL W P-5'-P-CCNC: <5 U/L (ref 1–33)
ANION GAP SERPL CALCULATED.3IONS-SCNC: 11.4 MMOL/L (ref 5–15)
AST SERPL-CCNC: 18 U/L (ref 1–32)
BASOPHILS # BLD AUTO: 0.06 10*3/MM3 (ref 0–0.2)
BASOPHILS NFR BLD AUTO: 0.8 % (ref 0–1.5)
BILIRUB SERPL-MCNC: 0.5 MG/DL (ref 0–1.2)
BUN SERPL-MCNC: 17 MG/DL (ref 8–23)
BUN/CREAT SERPL: 18.9 (ref 7–25)
CALCIUM SPEC-SCNC: 9.2 MG/DL (ref 8.6–10.5)
CHLORIDE SERPL-SCNC: 106 MMOL/L (ref 98–107)
CO2 SERPL-SCNC: 23.6 MMOL/L (ref 22–29)
CREAT SERPL-MCNC: 0.9 MG/DL (ref 0.57–1)
DEPRECATED RDW RBC AUTO: 60.1 FL (ref 37–54)
EGFRCR SERPLBLD CKD-EPI 2021: 72.4 ML/MIN/1.73
EOSINOPHIL # BLD AUTO: 0.58 10*3/MM3 (ref 0–0.4)
EOSINOPHIL NFR BLD AUTO: 7.4 % (ref 0.3–6.2)
ERYTHROCYTE [DISTWIDTH] IN BLOOD BY AUTOMATED COUNT: 18.9 % (ref 12.3–15.4)
GLOBULIN UR ELPH-MCNC: 2.7 GM/DL
GLUCOSE SERPL-MCNC: 90 MG/DL (ref 65–99)
HCT VFR BLD AUTO: 37.6 % (ref 34–46.6)
HGB BLD-MCNC: 11.5 G/DL (ref 12–15.9)
LYMPHOCYTES # BLD AUTO: 1.07 10*3/MM3 (ref 0.7–3.1)
LYMPHOCYTES NFR BLD AUTO: 13.6 % (ref 19.6–45.3)
MCH RBC QN AUTO: 27.3 PG (ref 26.6–33)
MCHC RBC AUTO-ENTMCNC: 30.6 G/DL (ref 31.5–35.7)
MCV RBC AUTO: 89.3 FL (ref 79–97)
MONOCYTES # BLD AUTO: 0.55 10*3/MM3 (ref 0.1–0.9)
MONOCYTES NFR BLD AUTO: 7 % (ref 5–12)
NEUTROPHILS NFR BLD AUTO: 5.6 10*3/MM3 (ref 1.7–7)
NEUTROPHILS NFR BLD AUTO: 71.2 % (ref 42.7–76)
PLATELET # BLD AUTO: 287 10*3/MM3 (ref 140–450)
PMV BLD AUTO: 9 FL (ref 6–12)
POTASSIUM SERPL-SCNC: 3.9 MMOL/L (ref 3.5–5.2)
PROT SERPL-MCNC: 6.5 G/DL (ref 6–8.5)
RBC # BLD AUTO: 4.21 10*6/MM3 (ref 3.77–5.28)
SODIUM SERPL-SCNC: 141 MMOL/L (ref 136–145)
WBC NRBC COR # BLD AUTO: 7.86 10*3/MM3 (ref 3.4–10.8)

## 2024-09-24 PROCEDURE — 25810000003 SODIUM CHLORIDE 0.9 % SOLUTION 250 ML FLEX CONT: Performed by: INTERNAL MEDICINE

## 2024-09-24 PROCEDURE — G0463 HOSPITAL OUTPT CLINIC VISIT: HCPCS | Performed by: INTERNAL MEDICINE

## 2024-09-24 PROCEDURE — 25010000002 TRASTUZUMAB-DKST 420 MG RECONSTITUTED SOLUTION 1 EACH VIAL: Performed by: INTERNAL MEDICINE

## 2024-09-24 PROCEDURE — 25010000002 PERTUZUMAB 420 MG/14ML SOLUTION 420 MG VIAL: Performed by: INTERNAL MEDICINE

## 2024-09-24 PROCEDURE — 80053 COMPREHEN METABOLIC PANEL: CPT | Performed by: INTERNAL MEDICINE

## 2024-09-24 PROCEDURE — 25010000002 HEPARIN LOCK FLUSH PER 10 UNITS: Performed by: INTERNAL MEDICINE

## 2024-09-24 PROCEDURE — 96417 CHEMO IV INFUS EACH ADDL SEQ: CPT

## 2024-09-24 PROCEDURE — 85025 COMPLETE CBC W/AUTO DIFF WBC: CPT | Performed by: INTERNAL MEDICINE

## 2024-09-24 PROCEDURE — 96413 CHEMO IV INFUSION 1 HR: CPT

## 2024-09-24 PROCEDURE — 25810000003 SODIUM CHLORIDE 0.9 % SOLUTION: Performed by: INTERNAL MEDICINE

## 2024-09-24 RX ORDER — SODIUM CHLORIDE 9 MG/ML
20 INJECTION, SOLUTION INTRAVENOUS ONCE
Status: CANCELLED | OUTPATIENT
Start: 2024-09-24

## 2024-09-24 RX ORDER — SODIUM CHLORIDE 0.9 % (FLUSH) 0.9 %
10 SYRINGE (ML) INJECTION AS NEEDED
Status: DISCONTINUED | OUTPATIENT
Start: 2024-09-24 | End: 2024-09-25 | Stop reason: HOSPADM

## 2024-09-24 RX ORDER — SODIUM CHLORIDE 9 MG/ML
20 INJECTION, SOLUTION INTRAVENOUS ONCE
Status: COMPLETED | OUTPATIENT
Start: 2024-09-24 | End: 2024-09-24

## 2024-09-24 RX ORDER — HEPARIN SODIUM (PORCINE) LOCK FLUSH IV SOLN 100 UNIT/ML 100 UNIT/ML
500 SOLUTION INTRAVENOUS AS NEEDED
OUTPATIENT
Start: 2024-09-24

## 2024-09-24 RX ORDER — HEPARIN SODIUM (PORCINE) LOCK FLUSH IV SOLN 100 UNIT/ML 100 UNIT/ML
500 SOLUTION INTRAVENOUS AS NEEDED
Status: DISCONTINUED | OUTPATIENT
Start: 2024-09-24 | End: 2024-09-25 | Stop reason: HOSPADM

## 2024-09-24 RX ORDER — SODIUM CHLORIDE 0.9 % (FLUSH) 0.9 %
10 SYRINGE (ML) INJECTION AS NEEDED
OUTPATIENT
Start: 2024-09-24

## 2024-09-24 RX ADMIN — PERTUZUMAB 420 MG: 30 INJECTION, SOLUTION, CONCENTRATE INTRAVENOUS at 09:52

## 2024-09-24 RX ADMIN — TRASTUZUMAB-DKST 490 MG: KIT at 10:59

## 2024-09-24 RX ADMIN — SODIUM CHLORIDE 20 ML/HR: 9 INJECTION, SOLUTION INTRAVENOUS at 09:47

## 2024-09-24 RX ADMIN — Medication 10 ML: at 11:36

## 2024-09-24 RX ADMIN — HEPARIN 500 UNITS: 100 SYRINGE at 11:36

## 2024-09-26 ENCOUNTER — HOSPITAL ENCOUNTER (OUTPATIENT)
Dept: RADIATION ONCOLOGY | Facility: HOSPITAL | Age: 64
Discharge: HOME | End: 2024-09-26

## 2024-09-26 PROCEDURE — 77334 RADIATION TREATMENT AID(S): CPT | Performed by: INTERNAL MEDICINE

## 2024-10-01 ENCOUNTER — HOSPITAL ENCOUNTER (OUTPATIENT)
Dept: RADIATION ONCOLOGY | Facility: HOSPITAL | Age: 64
Setting detail: RADIATION/ONCOLOGY SERIES
End: 2024-10-01
Payer: COMMERCIAL

## 2024-10-01 PROCEDURE — 77338 DESIGN MLC DEVICE FOR IMRT: CPT | Performed by: INTERNAL MEDICINE

## 2024-10-01 PROCEDURE — 77300 RADIATION THERAPY DOSE PLAN: CPT | Performed by: INTERNAL MEDICINE

## 2024-10-01 PROCEDURE — 77301 RADIOTHERAPY DOSE PLAN IMRT: CPT | Performed by: INTERNAL MEDICINE

## 2024-10-01 PROCEDURE — 77293 RESPIRATOR MOTION MGMT SIMUL: CPT | Performed by: INTERNAL MEDICINE

## 2024-10-02 ENCOUNTER — HOSPITAL ENCOUNTER (OUTPATIENT)
Dept: RADIATION ONCOLOGY | Facility: HOSPITAL | Age: 64
Discharge: HOME | End: 2024-10-02

## 2024-10-02 LAB
RAD ONC ARIA COURSE ID: NORMAL
RAD ONC ARIA COURSE LAST TREATMENT DATE: NORMAL
RAD ONC ARIA COURSE START DATE: NORMAL
RAD ONC ARIA COURSE TREATMENT ELAPSED DAYS: 0
RAD ONC ARIA FIRST TREATMENT DATE: NORMAL
RAD ONC ARIA PLAN FRACTIONS TREATED TO DATE: 1
RAD ONC ARIA PLAN ID: NORMAL
RAD ONC ARIA PLAN PRESCRIBED DOSE PER FRACTION: 2.5 GY
RAD ONC ARIA PLAN PRIMARY REFERENCE POINT: NORMAL
RAD ONC ARIA PLAN TOTAL FRACTIONS PRESCRIBED: 20
RAD ONC ARIA PLAN TOTAL PRESCRIBED DOSE: 5000 CGY
RAD ONC ARIA REFERENCE POINT DOSAGE GIVEN TO DATE: 2.5 GY
RAD ONC ARIA REFERENCE POINT ID: NORMAL
RAD ONC ARIA REFERENCE POINT SESSION DOSAGE GIVEN: 2.5 GY

## 2024-10-02 PROCEDURE — 77386: CPT | Performed by: INTERNAL MEDICINE

## 2024-10-02 PROCEDURE — 77427 RADIATION TX MANAGEMENT X5: CPT | Performed by: INTERNAL MEDICINE

## 2024-10-02 PROCEDURE — 77014 CHG CT GUIDANCE RADIATION THERAPY FLDS PLACEMENT: CPT | Performed by: INTERNAL MEDICINE

## 2024-10-03 ENCOUNTER — HOSPITAL ENCOUNTER (OUTPATIENT)
Dept: RADIATION ONCOLOGY | Facility: HOSPITAL | Age: 64
Discharge: HOME | End: 2024-10-03

## 2024-10-03 LAB
RAD ONC ARIA COURSE ID: NORMAL
RAD ONC ARIA COURSE LAST TREATMENT DATE: NORMAL
RAD ONC ARIA COURSE START DATE: NORMAL
RAD ONC ARIA COURSE TREATMENT ELAPSED DAYS: 1
RAD ONC ARIA FIRST TREATMENT DATE: NORMAL
RAD ONC ARIA PLAN FRACTIONS TREATED TO DATE: 2
RAD ONC ARIA PLAN ID: NORMAL
RAD ONC ARIA PLAN PRESCRIBED DOSE PER FRACTION: 2.5 GY
RAD ONC ARIA PLAN PRIMARY REFERENCE POINT: NORMAL
RAD ONC ARIA PLAN TOTAL FRACTIONS PRESCRIBED: 20
RAD ONC ARIA PLAN TOTAL PRESCRIBED DOSE: 5000 CGY
RAD ONC ARIA REFERENCE POINT DOSAGE GIVEN TO DATE: 5 GY
RAD ONC ARIA REFERENCE POINT ID: NORMAL
RAD ONC ARIA REFERENCE POINT SESSION DOSAGE GIVEN: 2.5 GY

## 2024-10-03 PROCEDURE — 77386: CPT | Performed by: INTERNAL MEDICINE

## 2024-10-03 PROCEDURE — 77336 RADIATION PHYSICS CONSULT: CPT | Performed by: INTERNAL MEDICINE

## 2024-10-03 PROCEDURE — 77014 CHG CT GUIDANCE RADIATION THERAPY FLDS PLACEMENT: CPT | Performed by: INTERNAL MEDICINE

## 2024-10-04 ENCOUNTER — HOSPITAL ENCOUNTER (OUTPATIENT)
Dept: RADIATION ONCOLOGY | Facility: HOSPITAL | Age: 64
Discharge: HOME | End: 2024-10-04

## 2024-10-04 LAB
RAD ONC ARIA COURSE ID: NORMAL
RAD ONC ARIA COURSE LAST TREATMENT DATE: NORMAL
RAD ONC ARIA COURSE START DATE: NORMAL
RAD ONC ARIA COURSE TREATMENT ELAPSED DAYS: 2
RAD ONC ARIA FIRST TREATMENT DATE: NORMAL
RAD ONC ARIA PLAN FRACTIONS TREATED TO DATE: 3
RAD ONC ARIA PLAN ID: NORMAL
RAD ONC ARIA PLAN PRESCRIBED DOSE PER FRACTION: 2.5 GY
RAD ONC ARIA PLAN PRIMARY REFERENCE POINT: NORMAL
RAD ONC ARIA PLAN TOTAL FRACTIONS PRESCRIBED: 20
RAD ONC ARIA PLAN TOTAL PRESCRIBED DOSE: 5000 CGY
RAD ONC ARIA REFERENCE POINT DOSAGE GIVEN TO DATE: 7.5 GY
RAD ONC ARIA REFERENCE POINT ID: NORMAL
RAD ONC ARIA REFERENCE POINT SESSION DOSAGE GIVEN: 2.5 GY

## 2024-10-04 PROCEDURE — 77014 CHG CT GUIDANCE RADIATION THERAPY FLDS PLACEMENT: CPT | Performed by: INTERNAL MEDICINE

## 2024-10-04 PROCEDURE — 77386: CPT | Performed by: INTERNAL MEDICINE

## 2024-10-07 ENCOUNTER — RADIATION ONCOLOGY WEEKLY ASSESSMENT (OUTPATIENT)
Dept: RADIATION ONCOLOGY | Facility: HOSPITAL | Age: 64
End: 2024-10-07
Payer: COMMERCIAL

## 2024-10-07 ENCOUNTER — HOSPITAL ENCOUNTER (OUTPATIENT)
Dept: RADIATION ONCOLOGY | Facility: HOSPITAL | Age: 64
Discharge: HOME | End: 2024-10-07
Payer: COMMERCIAL

## 2024-10-07 VITALS
WEIGHT: 178.4 LBS | RESPIRATION RATE: 18 BRPM | OXYGEN SATURATION: 97 % | BODY MASS INDEX: 30.62 KG/M2 | HEART RATE: 107 BPM | SYSTOLIC BLOOD PRESSURE: 107 MMHG | DIASTOLIC BLOOD PRESSURE: 70 MMHG

## 2024-10-07 DIAGNOSIS — C50.911 CARCINOMA OF RIGHT BREAST METASTATIC TO LUNG: Primary | ICD-10-CM

## 2024-10-07 DIAGNOSIS — C78.00 CARCINOMA OF RIGHT BREAST METASTATIC TO LUNG: Primary | ICD-10-CM

## 2024-10-07 LAB
RAD ONC ARIA COURSE ID: NORMAL
RAD ONC ARIA COURSE LAST TREATMENT DATE: NORMAL
RAD ONC ARIA COURSE START DATE: NORMAL
RAD ONC ARIA COURSE TREATMENT ELAPSED DAYS: 5
RAD ONC ARIA FIRST TREATMENT DATE: NORMAL
RAD ONC ARIA PLAN FRACTIONS TREATED TO DATE: 4
RAD ONC ARIA PLAN ID: NORMAL
RAD ONC ARIA PLAN PRESCRIBED DOSE PER FRACTION: 2.5 GY
RAD ONC ARIA PLAN PRIMARY REFERENCE POINT: NORMAL
RAD ONC ARIA PLAN TOTAL FRACTIONS PRESCRIBED: 20
RAD ONC ARIA PLAN TOTAL PRESCRIBED DOSE: 5000 CGY
RAD ONC ARIA REFERENCE POINT DOSAGE GIVEN TO DATE: 10 GY
RAD ONC ARIA REFERENCE POINT ID: NORMAL
RAD ONC ARIA REFERENCE POINT SESSION DOSAGE GIVEN: 2.5 GY

## 2024-10-07 PROCEDURE — 77386: CPT | Performed by: INTERNAL MEDICINE

## 2024-10-07 PROCEDURE — 77014 CHG CT GUIDANCE RADIATION THERAPY FLDS PLACEMENT: CPT | Performed by: INTERNAL MEDICINE

## 2024-10-07 PROCEDURE — FACE2FACE: Performed by: INTERNAL MEDICINE

## 2024-10-07 NOTE — PROGRESS NOTES
[facility] RADIATION ONCOLOGY  ON-TREATMENT VISIT NOTE    NAME: Tracy Reddy  YOB: 1960  MRN #: [id number]  DATE OF SERVICE: 10/7/2024  PRESCRIBING PHYSICIAN: No care team member to display     DIAGNOSIS:      ICD-10-CM ICD-9-CM   1. Carcinoma of right breast metastatic to lung  C50.911 174.9    C78.00 197.0      RADIATION THERAPY VISIT:  Continue radiation therapy, Dosimetry plan remains acceptable, Films reviewed and remains acceptable, Pain assessed, Pain management planned, Radiation dose schedule reviewed and remains acceptable, Radiation technique remains acceptable, and Symptoms within expected range    Radiation Treatments       Active   Plans   RtLung   Most recent treatment: Dose planned: 250 cGy (fraction 4 on 10/7/2024)   Total: Dose planned: 5,000 cGy (20 fractions)   Elapsed Days: 5      Reference Points   RtLung   Most recent treatment: Dose given: 250 cGy (on 10/7/2024)   Total: Dose given: 1,000 cGy   Elapsed Days: 5                    [] Concurrent Chemo   Regimen:       PHYSICAL ASSESSMENT         Vitals:    10/07/24 0817   BP: 107/70   Pulse: 107   Resp: 18   SpO2: 97%      Wt Readings from Last 3 Encounters:   10/07/24 80.9 kg (178 lb 6.4 oz)   09/24/24 82.3 kg (181 lb 6.4 oz)   09/24/24 82.1 kg (181 lb)     Fully active, able to carry on all pre-disease performance without restriction = 0    We examined the relevant areas: yes  Findings are within the expected range for this stage of treatment: yes    ACTION ITEMS     Patient tolerating treatment well and as expected for this stage in their treatment and Continue radiation therapy as planned    Estimated Completion Date: 10/29/2024    Anticipatory guidance provided.    Patient reports some itchiness underneath her left breast, present prior to starting treatment.       No care team member to display   Radiation Oncology  [facility] [place]

## 2024-10-08 ENCOUNTER — HOSPITAL ENCOUNTER (OUTPATIENT)
Dept: RADIATION ONCOLOGY | Facility: HOSPITAL | Age: 64
Discharge: HOME | End: 2024-10-08

## 2024-10-08 LAB
RAD ONC ARIA COURSE ID: NORMAL
RAD ONC ARIA COURSE LAST TREATMENT DATE: NORMAL
RAD ONC ARIA COURSE START DATE: NORMAL
RAD ONC ARIA COURSE TREATMENT ELAPSED DAYS: 6
RAD ONC ARIA FIRST TREATMENT DATE: NORMAL
RAD ONC ARIA PLAN FRACTIONS TREATED TO DATE: 5
RAD ONC ARIA PLAN ID: NORMAL
RAD ONC ARIA PLAN PRESCRIBED DOSE PER FRACTION: 2.5 GY
RAD ONC ARIA PLAN PRIMARY REFERENCE POINT: NORMAL
RAD ONC ARIA PLAN TOTAL FRACTIONS PRESCRIBED: 20
RAD ONC ARIA PLAN TOTAL PRESCRIBED DOSE: 5000 CGY
RAD ONC ARIA REFERENCE POINT DOSAGE GIVEN TO DATE: 12.5 GY
RAD ONC ARIA REFERENCE POINT ID: NORMAL
RAD ONC ARIA REFERENCE POINT SESSION DOSAGE GIVEN: 2.5 GY

## 2024-10-08 PROCEDURE — 77014 CHG CT GUIDANCE RADIATION THERAPY FLDS PLACEMENT: CPT | Performed by: INTERNAL MEDICINE

## 2024-10-08 PROCEDURE — 77386: CPT | Performed by: INTERNAL MEDICINE

## 2024-10-09 ENCOUNTER — HOSPITAL ENCOUNTER (OUTPATIENT)
Dept: RADIATION ONCOLOGY | Facility: HOSPITAL | Age: 64
Discharge: HOME | End: 2024-10-09

## 2024-10-09 LAB
RAD ONC ARIA COURSE ID: NORMAL
RAD ONC ARIA COURSE LAST TREATMENT DATE: NORMAL
RAD ONC ARIA COURSE START DATE: NORMAL
RAD ONC ARIA COURSE TREATMENT ELAPSED DAYS: 7
RAD ONC ARIA FIRST TREATMENT DATE: NORMAL
RAD ONC ARIA PLAN FRACTIONS TREATED TO DATE: 6
RAD ONC ARIA PLAN ID: NORMAL
RAD ONC ARIA PLAN PRESCRIBED DOSE PER FRACTION: 2.5 GY
RAD ONC ARIA PLAN PRIMARY REFERENCE POINT: NORMAL
RAD ONC ARIA PLAN TOTAL FRACTIONS PRESCRIBED: 20
RAD ONC ARIA PLAN TOTAL PRESCRIBED DOSE: 5000 CGY
RAD ONC ARIA REFERENCE POINT DOSAGE GIVEN TO DATE: 15 GY
RAD ONC ARIA REFERENCE POINT ID: NORMAL
RAD ONC ARIA REFERENCE POINT SESSION DOSAGE GIVEN: 2.5 GY

## 2024-10-09 PROCEDURE — 77427 RADIATION TX MANAGEMENT X5: CPT | Performed by: INTERNAL MEDICINE

## 2024-10-09 PROCEDURE — 77014 CHG CT GUIDANCE RADIATION THERAPY FLDS PLACEMENT: CPT | Performed by: INTERNAL MEDICINE

## 2024-10-09 PROCEDURE — 77386: CPT | Performed by: INTERNAL MEDICINE

## 2024-10-10 ENCOUNTER — HOSPITAL ENCOUNTER (OUTPATIENT)
Dept: RADIATION ONCOLOGY | Facility: HOSPITAL | Age: 64
Discharge: HOME | End: 2024-10-10

## 2024-10-10 LAB
RAD ONC ARIA COURSE ID: NORMAL
RAD ONC ARIA COURSE LAST TREATMENT DATE: NORMAL
RAD ONC ARIA COURSE START DATE: NORMAL
RAD ONC ARIA COURSE TREATMENT ELAPSED DAYS: 8
RAD ONC ARIA FIRST TREATMENT DATE: NORMAL
RAD ONC ARIA PLAN FRACTIONS TREATED TO DATE: 7
RAD ONC ARIA PLAN ID: NORMAL
RAD ONC ARIA PLAN PRESCRIBED DOSE PER FRACTION: 2.5 GY
RAD ONC ARIA PLAN PRIMARY REFERENCE POINT: NORMAL
RAD ONC ARIA PLAN TOTAL FRACTIONS PRESCRIBED: 20
RAD ONC ARIA PLAN TOTAL PRESCRIBED DOSE: 5000 CGY
RAD ONC ARIA REFERENCE POINT DOSAGE GIVEN TO DATE: 17.5 GY
RAD ONC ARIA REFERENCE POINT ID: NORMAL
RAD ONC ARIA REFERENCE POINT SESSION DOSAGE GIVEN: 2.5 GY

## 2024-10-10 PROCEDURE — 77014 CHG CT GUIDANCE RADIATION THERAPY FLDS PLACEMENT: CPT | Performed by: INTERNAL MEDICINE

## 2024-10-10 PROCEDURE — 77336 RADIATION PHYSICS CONSULT: CPT | Performed by: INTERNAL MEDICINE

## 2024-10-10 PROCEDURE — 77386: CPT | Performed by: INTERNAL MEDICINE

## 2024-10-11 ENCOUNTER — HOSPITAL ENCOUNTER (OUTPATIENT)
Dept: RADIATION ONCOLOGY | Facility: HOSPITAL | Age: 64
Discharge: HOME | End: 2024-10-11

## 2024-10-11 LAB
RAD ONC ARIA COURSE ID: NORMAL
RAD ONC ARIA COURSE LAST TREATMENT DATE: NORMAL
RAD ONC ARIA COURSE START DATE: NORMAL
RAD ONC ARIA COURSE TREATMENT ELAPSED DAYS: 9
RAD ONC ARIA FIRST TREATMENT DATE: NORMAL
RAD ONC ARIA PLAN FRACTIONS TREATED TO DATE: 8
RAD ONC ARIA PLAN ID: NORMAL
RAD ONC ARIA PLAN PRESCRIBED DOSE PER FRACTION: 2.5 GY
RAD ONC ARIA PLAN PRIMARY REFERENCE POINT: NORMAL
RAD ONC ARIA PLAN TOTAL FRACTIONS PRESCRIBED: 20
RAD ONC ARIA PLAN TOTAL PRESCRIBED DOSE: 5000 CGY
RAD ONC ARIA REFERENCE POINT DOSAGE GIVEN TO DATE: 20 GY
RAD ONC ARIA REFERENCE POINT ID: NORMAL
RAD ONC ARIA REFERENCE POINT SESSION DOSAGE GIVEN: 2.5 GY

## 2024-10-11 PROCEDURE — 77014 CHG CT GUIDANCE RADIATION THERAPY FLDS PLACEMENT: CPT | Performed by: INTERNAL MEDICINE

## 2024-10-11 PROCEDURE — 77386: CPT | Performed by: INTERNAL MEDICINE

## 2024-10-14 ENCOUNTER — RADIATION ONCOLOGY WEEKLY ASSESSMENT (OUTPATIENT)
Dept: RADIATION ONCOLOGY | Facility: HOSPITAL | Age: 64
End: 2024-10-14
Payer: COMMERCIAL

## 2024-10-14 ENCOUNTER — HOSPITAL ENCOUNTER (OUTPATIENT)
Dept: RADIATION ONCOLOGY | Facility: HOSPITAL | Age: 64
Discharge: HOME | End: 2024-10-14
Payer: COMMERCIAL

## 2024-10-14 VITALS
WEIGHT: 179.6 LBS | HEART RATE: 92 BPM | RESPIRATION RATE: 18 BRPM | OXYGEN SATURATION: 98 % | SYSTOLIC BLOOD PRESSURE: 109 MMHG | DIASTOLIC BLOOD PRESSURE: 71 MMHG | HEIGHT: 64 IN | TEMPERATURE: 98.2 F | BODY MASS INDEX: 30.66 KG/M2

## 2024-10-14 DIAGNOSIS — C78.00 CARCINOMA OF RIGHT BREAST METASTATIC TO LUNG: ICD-10-CM

## 2024-10-14 DIAGNOSIS — C50.911 INVASIVE DUCTAL CARCINOMA OF BREAST, FEMALE, RIGHT: Primary | Chronic | ICD-10-CM

## 2024-10-14 DIAGNOSIS — C50.911 CARCINOMA OF RIGHT BREAST METASTATIC TO LUNG: ICD-10-CM

## 2024-10-14 LAB
RAD ONC ARIA COURSE ID: NORMAL
RAD ONC ARIA COURSE LAST TREATMENT DATE: NORMAL
RAD ONC ARIA COURSE START DATE: NORMAL
RAD ONC ARIA COURSE TREATMENT ELAPSED DAYS: 12
RAD ONC ARIA FIRST TREATMENT DATE: NORMAL
RAD ONC ARIA PLAN FRACTIONS TREATED TO DATE: 9
RAD ONC ARIA PLAN ID: NORMAL
RAD ONC ARIA PLAN PRESCRIBED DOSE PER FRACTION: 2.5 GY
RAD ONC ARIA PLAN PRIMARY REFERENCE POINT: NORMAL
RAD ONC ARIA PLAN TOTAL FRACTIONS PRESCRIBED: 20
RAD ONC ARIA PLAN TOTAL PRESCRIBED DOSE: 5000 CGY
RAD ONC ARIA REFERENCE POINT DOSAGE GIVEN TO DATE: 22.5 GY
RAD ONC ARIA REFERENCE POINT ID: NORMAL
RAD ONC ARIA REFERENCE POINT SESSION DOSAGE GIVEN: 2.5 GY

## 2024-10-14 PROCEDURE — 77386: CPT | Performed by: INTERNAL MEDICINE

## 2024-10-14 PROCEDURE — 77014 CHG CT GUIDANCE RADIATION THERAPY FLDS PLACEMENT: CPT | Performed by: INTERNAL MEDICINE

## 2024-10-14 NOTE — PROGRESS NOTES
[facility] RADIATION ONCOLOGY  ON-TREATMENT VISIT NOTE    NAME: Tracy Reddy  YOB: 1960  MRN #: [id number]  DATE OF SERVICE: 10/21/2024  PRESCRIBING PHYSICIAN: No care team member to display     DIAGNOSIS:      ICD-10-CM ICD-9-CM   1. Invasive ductal carcinoma of breast, female, right  C50.911 174.9   2. Carcinoma of right breast metastatic to lung  C50.911 174.9    C78.00 197.0        RADIATION THERAPY VISIT:  Continue radiation therapy, Dosimetry plan remains acceptable, Films reviewed and remains acceptable, Pain assessed, Pain management planned, Radiation dose schedule reviewed and remains acceptable, Radiation technique remains acceptable, and Symptoms within expected range    Radiation Treatments       Active   Plans   RtLung   Most recent treatment: Dose planned: 250 cGy (fraction 14 on 10/21/2024)   Total: Dose planned: 5,000 cGy (20 fractions)   Elapsed Days: 19      Reference Points   RtLung   Most recent treatment: Dose given: 250 cGy (on 10/21/2024)   Total: Dose given: 3,500 cGy   Elapsed Days: 19                    [] Concurrent Chemo   Regimen:       PHYSICAL ASSESSMENT         Vitals:    10/14/24 0837   BP: 109/71   Pulse: 92   Resp: 18   Temp: 98.2 °F (36.8 °C)   SpO2: 98%        Wt Readings from Last 3 Encounters:   10/21/24 81.2 kg (179 lb)   10/17/24 81.2 kg (179 lb)   10/15/24 81.6 kg (180 lb)     Fully active, able to carry on all pre-disease performance without restriction = 0    We examined the relevant areas: yes  Findings are within the expected range for this stage of treatment: yes    ACTION ITEMS     Patient tolerating treatment well and as expected for this stage in their treatment and Continue radiation therapy as planned    Estimated Completion Date: 10/29/2024    Anticipatory guidance provided.    Patient reports some itchiness underneath her left breast, present prior to starting treatment.     Patient has developed minor cough that she thinks  is potentially allergy related. Will monitor.       No care team member to display   Radiation Oncology  [facility]

## 2024-10-15 ENCOUNTER — HOSPITAL ENCOUNTER (OUTPATIENT)
Dept: ONCOLOGY | Facility: HOSPITAL | Age: 64
Discharge: HOME | End: 2024-10-15
Admitting: INTERNAL MEDICINE
Payer: COMMERCIAL

## 2024-10-15 ENCOUNTER — HOSPITAL ENCOUNTER (OUTPATIENT)
Dept: RADIATION ONCOLOGY | Facility: HOSPITAL | Age: 64
Discharge: HOME | End: 2024-10-15

## 2024-10-15 VITALS
HEIGHT: 64 IN | OXYGEN SATURATION: 96 % | HEART RATE: 100 BPM | DIASTOLIC BLOOD PRESSURE: 52 MMHG | SYSTOLIC BLOOD PRESSURE: 109 MMHG | WEIGHT: 180 LBS | RESPIRATION RATE: 18 BRPM | BODY MASS INDEX: 30.73 KG/M2 | TEMPERATURE: 97.8 F

## 2024-10-15 DIAGNOSIS — C50.911 BILATERAL MALIGNANT NEOPLASM OF BREAST IN FEMALE, ESTROGEN RECEPTOR POSITIVE, UNSPECIFIED SITE OF BREAST: Primary | ICD-10-CM

## 2024-10-15 DIAGNOSIS — C77.1 BREAST CANCER METASTASIZED TO INTRATHORACIC LYMPH NODE, RIGHT: ICD-10-CM

## 2024-10-15 DIAGNOSIS — C50.911 BREAST CANCER METASTASIZED TO INTRATHORACIC LYMPH NODE, RIGHT: ICD-10-CM

## 2024-10-15 DIAGNOSIS — C50.912 BILATERAL MALIGNANT NEOPLASM OF BREAST IN FEMALE, ESTROGEN RECEPTOR POSITIVE, UNSPECIFIED SITE OF BREAST: Primary | ICD-10-CM

## 2024-10-15 DIAGNOSIS — R91.8 LUNG MASS: ICD-10-CM

## 2024-10-15 DIAGNOSIS — Z17.0 BILATERAL MALIGNANT NEOPLASM OF BREAST IN FEMALE, ESTROGEN RECEPTOR POSITIVE, UNSPECIFIED SITE OF BREAST: Primary | ICD-10-CM

## 2024-10-15 LAB
ALP BLD-CCNC: 71 U/L (ref 42–141)
BASOPHILS # BLD AUTO: 0.03 10*3/MM3 (ref 0–0.2)
BASOPHILS NFR BLD AUTO: 0.7 % (ref 0–1.5)
BUN BLDA-MCNC: 13 MG/DL (ref 7–22)
CALCIUM BLD QL: 8.9 MG/DL (ref 8–10.3)
CHLORIDE BLDA-SCNC: 109 MMOL/L (ref 98–108)
CO2 BLDA-SCNC: 26 MMOL/L (ref 18–33)
CREAT BLDA-MCNC: 0.9 MG/DL (ref 0.6–1.2)
DEPRECATED RDW RBC AUTO: 55.7 FL (ref 37–54)
EGFRCR SERPLBLD CKD-EPI 2021: 72.4 ML/MIN/1.73
EOSINOPHIL # BLD AUTO: 0.46 10*3/MM3 (ref 0–0.4)
EOSINOPHIL NFR BLD AUTO: 10.2 % (ref 0.3–6.2)
ERYTHROCYTE [DISTWIDTH] IN BLOOD BY AUTOMATED COUNT: 17.5 % (ref 12.3–15.4)
GLUCOSE BLDC GLUCOMTR-MCNC: 105 MG/DL (ref 73–118)
HCT VFR BLD AUTO: 38 % (ref 34–46.6)
HGB BLD-MCNC: 11.6 G/DL (ref 12–15.9)
LYMPHOCYTES # BLD AUTO: 0.49 10*3/MM3 (ref 0.7–3.1)
LYMPHOCYTES NFR BLD AUTO: 10.9 % (ref 19.6–45.3)
MCH RBC QN AUTO: 27.1 PG (ref 26.6–33)
MCHC RBC AUTO-ENTMCNC: 30.5 G/DL (ref 31.5–35.7)
MCV RBC AUTO: 88.8 FL (ref 79–97)
MONOCYTES # BLD AUTO: 0.4 10*3/MM3 (ref 0.1–0.9)
MONOCYTES NFR BLD AUTO: 8.9 % (ref 5–12)
NEUTROPHILS NFR BLD AUTO: 3.13 10*3/MM3 (ref 1.7–7)
NEUTROPHILS NFR BLD AUTO: 69.3 % (ref 42.7–76)
PLATELET # BLD AUTO: 184 10*3/MM3 (ref 140–450)
PMV BLD AUTO: 8.9 FL (ref 6–12)
POC ALBUMIN: 3.2 G/L (ref 3.3–5.5)
POC ALT (SGPT): 10 U/L (ref 10–47)
POC AST (SGOT): 21 U/L (ref 11–38)
POC TOTAL BILIRUBIN: 0.9 MG/DL (ref 0.2–1.6)
POC TOTAL PROTEIN: 6.2 G/DL (ref 6.4–8.1)
POTASSIUM BLDA-SCNC: 3.5 MMOL/L (ref 3.6–5.1)
RAD ONC ARIA COURSE ID: NORMAL
RAD ONC ARIA COURSE LAST TREATMENT DATE: NORMAL
RAD ONC ARIA COURSE START DATE: NORMAL
RAD ONC ARIA COURSE TREATMENT ELAPSED DAYS: 13
RAD ONC ARIA FIRST TREATMENT DATE: NORMAL
RAD ONC ARIA PLAN FRACTIONS TREATED TO DATE: 10
RAD ONC ARIA PLAN ID: NORMAL
RAD ONC ARIA PLAN PRESCRIBED DOSE PER FRACTION: 2.5 GY
RAD ONC ARIA PLAN PRIMARY REFERENCE POINT: NORMAL
RAD ONC ARIA PLAN TOTAL FRACTIONS PRESCRIBED: 20
RAD ONC ARIA PLAN TOTAL PRESCRIBED DOSE: 5000 CGY
RAD ONC ARIA REFERENCE POINT DOSAGE GIVEN TO DATE: 25 GY
RAD ONC ARIA REFERENCE POINT ID: NORMAL
RAD ONC ARIA REFERENCE POINT SESSION DOSAGE GIVEN: 2.5 GY
RBC # BLD AUTO: 4.28 10*6/MM3 (ref 3.77–5.28)
SODIUM BLD-SCNC: 144 MMOL/L (ref 128–145)
WBC NRBC COR # BLD AUTO: 4.51 10*3/MM3 (ref 3.4–10.8)

## 2024-10-15 PROCEDURE — 77014 CHG CT GUIDANCE RADIATION THERAPY FLDS PLACEMENT: CPT | Performed by: INTERNAL MEDICINE

## 2024-10-15 PROCEDURE — 25010000002 PERTUZUMAB 420 MG/14ML SOLUTION 420 MG VIAL: Performed by: NURSE PRACTITIONER

## 2024-10-15 PROCEDURE — 96417 CHEMO IV INFUS EACH ADDL SEQ: CPT

## 2024-10-15 PROCEDURE — 25010000002 HEPARIN LOCK FLUSH PER 10 UNITS: Performed by: INTERNAL MEDICINE

## 2024-10-15 PROCEDURE — 77386: CPT | Performed by: INTERNAL MEDICINE

## 2024-10-15 PROCEDURE — 85025 COMPLETE CBC W/AUTO DIFF WBC: CPT | Performed by: INTERNAL MEDICINE

## 2024-10-15 PROCEDURE — 25810000003 SODIUM CHLORIDE 0.9 % SOLUTION 250 ML FLEX CONT: Performed by: NURSE PRACTITIONER

## 2024-10-15 PROCEDURE — 96413 CHEMO IV INFUSION 1 HR: CPT

## 2024-10-15 PROCEDURE — 80053 COMPREHEN METABOLIC PANEL: CPT

## 2024-10-15 PROCEDURE — 25010000002 TRASTUZUMAB-DKST 420 MG RECONSTITUTED SOLUTION 1 EACH VIAL: Performed by: NURSE PRACTITIONER

## 2024-10-15 RX ORDER — HEPARIN SODIUM (PORCINE) LOCK FLUSH IV SOLN 100 UNIT/ML 100 UNIT/ML
500 SOLUTION INTRAVENOUS AS NEEDED
Status: DISCONTINUED | OUTPATIENT
Start: 2024-10-15 | End: 2024-10-16 | Stop reason: HOSPADM

## 2024-10-15 RX ORDER — SODIUM CHLORIDE 9 MG/ML
20 INJECTION, SOLUTION INTRAVENOUS ONCE
Status: DISCONTINUED | OUTPATIENT
Start: 2024-10-15 | End: 2024-10-16 | Stop reason: HOSPADM

## 2024-10-15 RX ORDER — SODIUM CHLORIDE 0.9 % (FLUSH) 0.9 %
10 SYRINGE (ML) INJECTION AS NEEDED
Status: DISCONTINUED | OUTPATIENT
Start: 2024-10-15 | End: 2024-10-16 | Stop reason: HOSPADM

## 2024-10-15 RX ORDER — HEPARIN SODIUM (PORCINE) LOCK FLUSH IV SOLN 100 UNIT/ML 100 UNIT/ML
500 SOLUTION INTRAVENOUS AS NEEDED
Status: CANCELLED | OUTPATIENT
Start: 2024-10-15

## 2024-10-15 RX ORDER — SODIUM CHLORIDE 9 MG/ML
20 INJECTION, SOLUTION INTRAVENOUS ONCE
Status: CANCELLED | OUTPATIENT
Start: 2024-10-15

## 2024-10-15 RX ORDER — SODIUM CHLORIDE 0.9 % (FLUSH) 0.9 %
10 SYRINGE (ML) INJECTION AS NEEDED
Status: CANCELLED | OUTPATIENT
Start: 2024-10-15

## 2024-10-15 RX ADMIN — Medication 500 UNITS: at 11:16

## 2024-10-15 RX ADMIN — TRASTUZUMAB-DKST 490 MG: KIT at 10:38

## 2024-10-15 RX ADMIN — PERTUZUMAB 420 MG: 30 INJECTION, SOLUTION, CONCENTRATE INTRAVENOUS at 10:03

## 2024-10-15 RX ADMIN — Medication 10 ML: at 11:16

## 2024-10-16 ENCOUNTER — HOSPITAL ENCOUNTER (OUTPATIENT)
Dept: RADIATION ONCOLOGY | Facility: HOSPITAL | Age: 64
Discharge: HOME | End: 2024-10-16

## 2024-10-16 LAB
RAD ONC ARIA COURSE ID: NORMAL
RAD ONC ARIA COURSE LAST TREATMENT DATE: NORMAL
RAD ONC ARIA COURSE START DATE: NORMAL
RAD ONC ARIA COURSE TREATMENT ELAPSED DAYS: 14
RAD ONC ARIA FIRST TREATMENT DATE: NORMAL
RAD ONC ARIA PLAN FRACTIONS TREATED TO DATE: 11
RAD ONC ARIA PLAN ID: NORMAL
RAD ONC ARIA PLAN PRESCRIBED DOSE PER FRACTION: 2.5 GY
RAD ONC ARIA PLAN PRIMARY REFERENCE POINT: NORMAL
RAD ONC ARIA PLAN TOTAL FRACTIONS PRESCRIBED: 20
RAD ONC ARIA PLAN TOTAL PRESCRIBED DOSE: 5000 CGY
RAD ONC ARIA REFERENCE POINT DOSAGE GIVEN TO DATE: 27.5 GY
RAD ONC ARIA REFERENCE POINT ID: NORMAL
RAD ONC ARIA REFERENCE POINT SESSION DOSAGE GIVEN: 2.5 GY

## 2024-10-16 PROCEDURE — 77386: CPT | Performed by: INTERNAL MEDICINE

## 2024-10-16 PROCEDURE — 77427 RADIATION TX MANAGEMENT X5: CPT | Performed by: INTERNAL MEDICINE

## 2024-10-16 PROCEDURE — 77014 CHG CT GUIDANCE RADIATION THERAPY FLDS PLACEMENT: CPT | Performed by: INTERNAL MEDICINE

## 2024-10-17 ENCOUNTER — HOSPITAL ENCOUNTER (OUTPATIENT)
Dept: ONCOLOGY | Facility: HOSPITAL | Age: 64
Discharge: HOME | End: 2024-10-17
Admitting: INTERNAL MEDICINE
Payer: COMMERCIAL

## 2024-10-17 ENCOUNTER — HOSPITAL ENCOUNTER (OUTPATIENT)
Dept: RADIATION ONCOLOGY | Facility: HOSPITAL | Age: 64
Discharge: HOME | End: 2024-10-17

## 2024-10-17 ENCOUNTER — OFFICE VISIT (OUTPATIENT)
Dept: ONCOLOGY | Facility: CLINIC | Age: 64
End: 2024-10-17
Payer: COMMERCIAL

## 2024-10-17 VITALS
SYSTOLIC BLOOD PRESSURE: 109 MMHG | BODY MASS INDEX: 30.56 KG/M2 | RESPIRATION RATE: 18 BRPM | TEMPERATURE: 98 F | DIASTOLIC BLOOD PRESSURE: 77 MMHG | OXYGEN SATURATION: 99 % | HEIGHT: 64 IN | HEART RATE: 98 BPM | WEIGHT: 179 LBS

## 2024-10-17 DIAGNOSIS — C50.911 BREAST CANCER METASTASIZED TO INTRATHORACIC LYMPH NODE, RIGHT: ICD-10-CM

## 2024-10-17 DIAGNOSIS — C77.1 BREAST CANCER METASTASIZED TO INTRATHORACIC LYMPH NODE, RIGHT: ICD-10-CM

## 2024-10-17 DIAGNOSIS — Z17.0 BILATERAL MALIGNANT NEOPLASM OF BREAST IN FEMALE, ESTROGEN RECEPTOR POSITIVE, UNSPECIFIED SITE OF BREAST: Primary | ICD-10-CM

## 2024-10-17 DIAGNOSIS — R91.8 LUNG MASS: Primary | ICD-10-CM

## 2024-10-17 DIAGNOSIS — C50.911 BILATERAL MALIGNANT NEOPLASM OF BREAST IN FEMALE, ESTROGEN RECEPTOR POSITIVE, UNSPECIFIED SITE OF BREAST: Primary | ICD-10-CM

## 2024-10-17 DIAGNOSIS — C50.912 BILATERAL MALIGNANT NEOPLASM OF BREAST IN FEMALE, ESTROGEN RECEPTOR POSITIVE, UNSPECIFIED SITE OF BREAST: Primary | ICD-10-CM

## 2024-10-17 LAB
BASOPHILS # BLD AUTO: 0.03 10*3/MM3 (ref 0–0.2)
BASOPHILS NFR BLD AUTO: 0.6 % (ref 0–1.5)
DEPRECATED RDW RBC AUTO: 56.6 FL (ref 37–54)
EOSINOPHIL # BLD AUTO: 0.41 10*3/MM3 (ref 0–0.4)
EOSINOPHIL NFR BLD AUTO: 8.6 % (ref 0.3–6.2)
ERYTHROCYTE [DISTWIDTH] IN BLOOD BY AUTOMATED COUNT: 17.6 % (ref 12.3–15.4)
HCT VFR BLD AUTO: 39 % (ref 34–46.6)
HGB BLD-MCNC: 12 G/DL (ref 12–15.9)
LYMPHOCYTES # BLD AUTO: 0.58 10*3/MM3 (ref 0.7–3.1)
LYMPHOCYTES NFR BLD AUTO: 12.2 % (ref 19.6–45.3)
MCH RBC QN AUTO: 27.2 PG (ref 26.6–33)
MCHC RBC AUTO-ENTMCNC: 30.8 G/DL (ref 31.5–35.7)
MCV RBC AUTO: 88.4 FL (ref 79–97)
MONOCYTES # BLD AUTO: 0.42 10*3/MM3 (ref 0.1–0.9)
MONOCYTES NFR BLD AUTO: 8.9 % (ref 5–12)
NEUTROPHILS NFR BLD AUTO: 3.3 10*3/MM3 (ref 1.7–7)
NEUTROPHILS NFR BLD AUTO: 69.7 % (ref 42.7–76)
PLATELET # BLD AUTO: 194 10*3/MM3 (ref 140–450)
PMV BLD AUTO: 9.2 FL (ref 6–12)
RAD ONC ARIA COURSE ID: NORMAL
RAD ONC ARIA COURSE LAST TREATMENT DATE: NORMAL
RAD ONC ARIA COURSE START DATE: NORMAL
RAD ONC ARIA COURSE TREATMENT ELAPSED DAYS: 15
RAD ONC ARIA FIRST TREATMENT DATE: NORMAL
RAD ONC ARIA PLAN FRACTIONS TREATED TO DATE: 12
RAD ONC ARIA PLAN ID: NORMAL
RAD ONC ARIA PLAN PRESCRIBED DOSE PER FRACTION: 2.5 GY
RAD ONC ARIA PLAN PRIMARY REFERENCE POINT: NORMAL
RAD ONC ARIA PLAN TOTAL FRACTIONS PRESCRIBED: 20
RAD ONC ARIA PLAN TOTAL PRESCRIBED DOSE: 5000 CGY
RAD ONC ARIA REFERENCE POINT DOSAGE GIVEN TO DATE: 30 GY
RAD ONC ARIA REFERENCE POINT ID: NORMAL
RAD ONC ARIA REFERENCE POINT SESSION DOSAGE GIVEN: 2.5 GY
RBC # BLD AUTO: 4.41 10*6/MM3 (ref 3.77–5.28)
WBC NRBC COR # BLD AUTO: 4.74 10*3/MM3 (ref 3.4–10.8)

## 2024-10-17 PROCEDURE — 77014 CHG CT GUIDANCE RADIATION THERAPY FLDS PLACEMENT: CPT | Performed by: INTERNAL MEDICINE

## 2024-10-17 PROCEDURE — 77336 RADIATION PHYSICS CONSULT: CPT | Performed by: INTERNAL MEDICINE

## 2024-10-17 PROCEDURE — 25010000002 HEPARIN LOCK FLUSH PER 10 UNITS: Performed by: INTERNAL MEDICINE

## 2024-10-17 PROCEDURE — 85025 COMPLETE CBC W/AUTO DIFF WBC: CPT | Performed by: INTERNAL MEDICINE

## 2024-10-17 PROCEDURE — 77386: CPT | Performed by: INTERNAL MEDICINE

## 2024-10-17 PROCEDURE — 36591 DRAW BLOOD OFF VENOUS DEVICE: CPT

## 2024-10-17 RX ORDER — SODIUM CHLORIDE 0.9 % (FLUSH) 0.9 %
10 SYRINGE (ML) INJECTION AS NEEDED
OUTPATIENT
Start: 2024-10-17

## 2024-10-17 RX ORDER — HEPARIN SODIUM (PORCINE) LOCK FLUSH IV SOLN 100 UNIT/ML 100 UNIT/ML
500 SOLUTION INTRAVENOUS AS NEEDED
Status: DISCONTINUED | OUTPATIENT
Start: 2024-10-17 | End: 2024-10-18 | Stop reason: HOSPADM

## 2024-10-17 RX ORDER — SODIUM CHLORIDE 0.9 % (FLUSH) 0.9 %
10 SYRINGE (ML) INJECTION AS NEEDED
Status: DISCONTINUED | OUTPATIENT
Start: 2024-10-17 | End: 2024-10-18 | Stop reason: HOSPADM

## 2024-10-17 RX ORDER — HEPARIN SODIUM (PORCINE) LOCK FLUSH IV SOLN 100 UNIT/ML 100 UNIT/ML
500 SOLUTION INTRAVENOUS AS NEEDED
OUTPATIENT
Start: 2024-10-17

## 2024-10-17 RX ADMIN — Medication 500 UNITS: at 13:56

## 2024-10-17 RX ADMIN — Medication 10 ML: at 13:56

## 2024-10-17 NOTE — PROGRESS NOTES
HEMATOLOGY ONCOLOGY OUTPATIENT FOLLOW UP       Patient name: Tracy Reddy  : 1960  MRN: [id number]  Primary Care Physician: Provider, No Known  Referring Physician: No ref. provider found  Reason For Consult: breast cancer    History of Present Illness:    Tracy Reddy is a 64 y.o. female who presented to [facility] on 4/3/2024 with complaints of cough.  She initially presented to [facility] ED with complaints of fever up to 103 °F, productive cough with green and brown sputum, shortness of air and chest tightness that has been ongoing for several days.  She reports she has family members that have been sick at home.  She reports she has also had a history of PE in the past.  Labs show troponin was 13 x 2, .8.  CBC unremarkable.  Respiratory panel was negative. Chest x-ray showed masslike opacity within the right lower lobe.  Sputum cultures and blood cultures have been ordered.  She is a non-smoker.  Pulmonology also has been consulted.     24 CT chest angiogram  Impression:  1. No evidence of pulmonary embolism.  2. No acute cardiopulmonary process. There is a mass present within the right lower lobe likely representing malignancy. There is enlarged right hilar adenopathy likely representing local metastatic disease. No previous imaging available for comparison.        24  Hematology/Oncology was consulted.       From record review: Patient has a history of bilateral ductal carcinoma diagnosed in .  Right breast IDC, G1, ER positive, NV positive, HER-2 negative. Left breast IDC, G2, ER positive, NV positive, HER-2 negative. Left SLND revealed 1 of 2 lymph nodes positive for macro metastasis. She is status post bilateral mastectomy with reconstruction in .  She is also completed neoadjuvant chemotherapy with 6 cycles of TCHP.  Taxotere and carboplatin with dose reductions at cycle 2 secondary to chemotherapy-induced nausea and vomiting.  Completed  adjuvant Herceptin Perjeta for 1 year.  Status post radiation therapy left chest wall and regional nodes, 5 fractions for a total dose of 5000 cGray. Current treatment with hormone blockade with Arimidex.  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  She follows at [facility], reviewed most recent office note from 3/11/2024.     4/5/2024 status post bronchoscopy.  EBUS guided FNA of the right lower lobe hilar mass confirmed metastatic ductal carcinoma of the breast ER +100% WV +10%, HER2 nu pending  Respiratory panel positive for human metapneumovirus.    4/12/2024 PET/CT with 1.1 cm lesion in the region of the left vallecula concerning for second primary malignancy.  Recommend correlation with direct visualization, large right hilar mass consistent with malignancy, no evidence of metastatic disease elsewhere    Path with HER 2 positive 3+ by IHC, guardant NGS with PIK3CA     5/13/24 - started THP  6/3/24 - C2  6/24/24 - C3  7/15/2024: Patient received cycle 4 chemotherapy with Taxotere Herceptin Perjeta  8/5/2024: Patient received cycle 5 Taxotere Herceptin Perjeta      7/12/24 - CT imaging - There is decrease in size of the right hilar mass compared to the prior examination. This now measures approximately 3.0 x 1.5 cm, previously measured 4.2 x 3.4 cm using similar measurement technique.     -9/6/2024 patient had a PET CT scan there is a dramatic interval decrease in the size of the right lower lobe infrahilar mass when compared to previous PET scan.  There is no longer any visible mass or abnormal metabolic activity in this location.  There may be residual scarring in that location.  There is an uptake within the right paratracheal lymph node measuring 1.4 cm SUV 5.6 suggesting metastatic disease previously measured 0.8 cm.  No additional areas of abnormal metabolic activity was seen.  No other evidence of metastatic disease was seen    Palliative XRT today right lung mass ongoing under the  care of Dr. Wilburn     Subjective:    Continues to tolerate treatment well, has had some rash.      She denies any new issues.  She is tolerating letrozole well      Past Medical History:   Diagnosis Date    Cancer        Past Surgical History:   Procedure Laterality Date    BRONCHOSCOPY N/A 4/5/2024    Procedure: BRONCHOSCOPY WITH ENDOBRONCHIAL ULTRASOUND, bilateral bronchial washings, fine needle aspiration x 2 areas;  Surgeon: Edy Irving MD;  Location: [facility] ENDOSCOPY;  Service: Pulmonary;  Laterality: N/A;  post: lung mass    MASTECTOMY           Current Outpatient Medications:     Calcium Carb-Cholecalciferol (Calcium + Vitamin D3) 500-5 MG-MCG tablet per tablet, Take 1 tablet by mouth 2 (Two) Times a Day., Disp: 60 tablet, Rfl: 6    Calcium Citrate-Vitamin D (Citrus Calcium/Vitamin D) 200-6.25 MG-MCG tablet, Take  by mouth., Disp: , Rfl:     cetirizine (zyrTEC) 10 MG tablet, Take 1 tablet by mouth Daily., Disp: , Rfl:     letrozole (FEMARA) 2.5 MG tablet, Take 1 tablet by mouth Daily., Disp: 30 tablet, Rfl: 6    omeprazole (priLOSEC) 20 MG capsule, Take 1 capsule by mouth Daily., Disp: 30 capsule, Rfl: 5    ondansetron (ZOFRAN) 8 MG tablet, Take 1 tablet by mouth 3 (Three) Times a Day As Needed for Nausea or Vomiting. (Patient not taking: Reported on 10/17/2024), Disp: 30 tablet, Rfl: 5    ondansetron (ZOFRAN) 8 MG tablet, Take 1 tablet by mouth 3 (Three) Times a Day As Needed for Nausea or Vomiting. (Patient not taking: Reported on 10/17/2024), Disp: 30 tablet, Rfl: 5    promethazine (PHENERGAN) 12.5 MG tablet, Take 1 tablet by mouth Every 6 (Six) Hours As Needed for Nausea or Vomiting. (Patient not taking: Reported on 10/17/2024), Disp: 30 tablet, Rfl: 5  No current facility-administered medications for this visit.    Facility-Administered Medications Ordered in Other Visits:     heparin injection 500 Units, 500 Units, Intravenous, PRN, Cadence Nazario MD, 500 Units at 10/17/24 1356    sodium  "chloride 0.9 % flush 10 mL, 10 mL, Intravenous, PRN, Cadence Nazario MD, 10 mL at 10/17/24 1356    Allergies   Allergen Reactions    Penicillins Rash       No family history on file.    Cancer-related family history is not on file.    Social History     Tobacco Use    Smoking status: Never     Passive exposure: Never    Smokeless tobacco: Never   Vaping Use    Vaping status: Never Used   Substance Use Topics    Alcohol use: Never    Drug use: Never     Social History     Social History Narrative    Not on file      ROS:   Review of Systems   Constitutional:  Negative for chills, fatigue and fever.   HENT:  Negative for congestion and nosebleeds.    Eyes:  Negative for pain and itching.   Respiratory:  Negative for cough and shortness of breath.    Cardiovascular:  Negative for chest pain.   Gastrointestinal:  Negative for abdominal pain, blood in stool, diarrhea, nausea and vomiting.   Endocrine: Negative for cold intolerance and heat intolerance.   Genitourinary:  Negative for difficulty urinating.   Musculoskeletal:  Negative for arthralgias.   Skin:  Negative for rash.   Neurological:  Negative for dizziness and headaches.   Hematological:  Does not bruise/bleed easily.   Psychiatric/Behavioral:  Negative for behavioral problems.        Objective:  Vital signs:  Vitals:    10/17/24 1437   BP: 109/77   Pulse: 98   Resp: 18   Temp: 98 °F (36.7 °C)   TempSrc: Infrared   SpO2: 99%   Weight: 81.2 kg (179 lb)   Height: 162.6 cm (64\")   PainSc: 0-No pain               Body mass index is 30.73 kg/m².  ECOG  (0) Fully active, able to carry on all predisease performance without restriction    Physical Exam:   Physical Exam  Constitutional:       Appearance: Normal appearance.   HENT:      Head: Normocephalic and atraumatic.   Eyes:      Pupils: Pupils are equal, round, and reactive to light.   Cardiovascular:      Rate and Rhythm: Normal rate and regular rhythm.      Pulses: Normal pulses.      Heart sounds: Normal " heart sounds. No murmur heard.  Pulmonary:      Effort: Pulmonary effort is normal.      Breath sounds: Normal breath sounds.   Abdominal:      General: There is no distension.      Palpations: Abdomen is soft. There is no mass.      Tenderness: There is no abdominal tenderness.   Musculoskeletal:         General: Normal range of motion.      Cervical back: Normal range of motion and neck supple.   Skin:     General: Skin is warm.   Neurological:      General: No focal deficit present.      Mental Status: She is alert.   Psychiatric:         Mood and Affect: Mood normal.     I have reexamined the patient and the results are consistent with the previously documented exam. Cadence Nazario MD     Lab Results - Last 18 Months   Lab Units 10/17/24  1356 10/15/24  0847 09/24/24  0831   WBC 10*3/mm3 4.74 4.51 7.86   HEMOGLOBIN g/dL 12.0 11.6* 11.5*   HEMATOCRIT % 39.0 38.0 37.6   PLATELETS 10*3/mm3 194 184 287   MCV fL 88.4 88.8 89.3     Lab Results - Last 18 Months   Lab Units 10/15/24  0917 09/24/24  0831 08/26/24  0900 05/13/24  0909 05/13/24  0835 04/05/24  0415 04/04/24  0606 04/03/24  2251   SODIUM mmol/L  --  141  --   --  141 140   < > 134*   POTASSIUM mmol/L  --  3.9  --   --  4.1 4.2   < > 3.9   CHLORIDE mmol/L  --  106  --   --  105 104   < > 102   CO2 mmol/L  --  23.6  --   --  22.0 23.0   < > 22.1   BUN mg/dL  --  17  --   --  21 11   < > 19   CREATININE mg/dL 0.90 0.90 1.00   < > 0.95 0.89   < > 1.05*   CALCIUM mg/dL  --  9.2  --   --  9.7 9.2   < > 9.5   BILIRUBIN mg/dL  --  0.5  --   --  0.5  --   --  0.6   ALK PHOS U/L  --  82  --   --  92  --   --  80   ALT (SGPT) U/L  --  <5  --   --  10  --   --  15   AST (SGOT) U/L  --  18  --   --  17  --   --  22   GLUCOSE mg/dL  --  90  --   --  192* 94   < > 131*    < > = values in this interval not displayed.       Lab Results   Component Value Date    GLUCOSE 90 09/24/2024    BUN 17 09/24/2024    CREATININE 0.90 10/15/2024    EGFRIFNONA 57 (L) 11/10/2021  "   EGFRIFAFRI 66 11/10/2021    BCR 18.9 09/24/2024    K 3.9 09/24/2024    CO2 23.6 09/24/2024    CALCIUM 9.2 09/24/2024    ALBUMIN 3.8 09/24/2024    AST 18 09/24/2024    ALT <5 09/24/2024       Lab Results - Last 18 Months   Lab Units 04/05/24  0415   INR  0.99       No results found for: \"IRON\", \"TIBC\", \"FERRITIN\"    No results found for: \"FOLATE\"    No results found for: \"OCCULTBLD\"    No results found for: \"RETICCTPCT\"  No results found for: \"KSDPYRPK78\"  No results found for: \"SPEP\", \"UPEP\"  No results found for: \"LDH\", \"URICACID\"  No results found for: \"BABAK\", \"RF\", \"SEDRATE\"  No results found for: \"FIBRINOGEN\", \"HAPTOGLOBIN\"  Lab Results   Component Value Date    INR 0.99 04/05/2024     No results found for: \"\"  No results found for: \"CEA\"  No components found for: \"CA-19-9\"  No results found for: \"PSA\"  No results found for: \"SEDRATE\"    Assessment & Plan     Tracy Reddy is a 63 y.o. female with history of pulmonary embolism and bilateral invasive ductal carcinoma status post bilateral mastectomy, chemotherapy and radiation treatments, on Arimidex.  She presented with dyspnea and was found to have a lung mass in the right lower lobe with right hilar and suprahilar lymphadenopathy.     Right lung mass-metastatic ductal carcinoma of the breast -recurrent, ER/HI positive HER2/nancy 3+ on immunohistochemistry    CT imaging showed mass present within the medial right lower lobe measuring up to 3.2 x 4.1 cm that extends to the right hilar region.  Additional conglomerate of nodes present within right hilar to right suprahilar region measuring up to 2.7 x 1.8 cm.  Status post bronchoscopy EBUS 4/5/2024.    Biopsy confirms metastatic ductal carcinoma of the breast ER/HI positive, HER2 nu pending.  Further treatment will depend on HER2/nancy status.  Will also send tumor sample out for [facility] NGS testing.  She is on Arimidex right now but will need to switch to an alternate AI or a different agent given " mutational analysis findings.  If HER2 positive disease, will use HER2 blockade.  Given there is no other clear areas of metastasis.  We can still consider curative treatment with either radio-ablation or surgical resection.    Discussed at tumor board. Not a surgical candidate based on tumor location. Radiation possible. Will plan on starting chemo with herceptin perjeta and then consider radiation after 6 cycles.    NGS testing with guardant showing PIK3CA.  See report    Will then continue herceptin perjeta maintenance    Started treatment tolerating well has some side effects as above not significant to need any dose modification  CT imaging now with good response. Reviewed images independently and showed the patient. Reviewed labs, and notes from other providers.ordered labs    Our plan would be to continue 3 more cycles of Taxotere, Herceptin and Perjeta and then discuss at tumor board again about consolidation with surgery vs radiation. Initial thought was that radiation would be preferable given location.  Continue treatment no changes.    She has completed planned 6 cycles of Taxotere, Herceptin and Perjeta    Resume AI after chemotherapy can start letrozole at that point.    She is currently on letrozole 2.5 mg p.o. daily as well as continue Herceptin Perjeta IV  Has been with vitamin D twice a day    Refer to Dr. Armenta for 1.4 cm right paratracheal node: Surgery not recommended at this time.  Patient receiving XRT to right lung.  After completed we will plan to transition to Kadcyla for maintenance.  Reviewed Kadcyla with patient information was also given to her today    Insomnia  - complains more with steroids  Takes melatonin  Continue the same      History of bilateral invasive ductal carcinoma  -Diagnosed in 2020. She is status post bilateral mastectomy with reconstruction in 2021.  She is also completed neoadjuvant chemotherapy with 6 cycles of TCHP.  Taxotere and carboplatin with dose reductions at  cycle 2 secondary to chemotherapy-induced nausea and vomiting.  There is some confusion about her pathology as some reports show HER2 negative, some show positive.  However she did receive adjuvant Herceptin Perjeta maintenance which likely points towards HER2 positive disease.  She is also known to have CHEK2 mutation.  Completed adjuvant Herceptin Perjeta for 1 year.  Status post radiation therapy left chest wall and regional nodes, 5 fractions for a total dose of 5000 cGray. Current treatment with hormone blockade with Arimidex. She followed at [facility] has transferred care to us now         Pneumonia  Resolved  Lingering cough now    Osteopenia  DEXA with osteopenia. continue calcium vitamin D.  Continue    Rash  Likely herceptin related  Lasted for a week   Recommend hydrocortisone topical   Rash has improved    Neutropenia due to chemotherapy    Neutropenic precautions  Add Neulasta with cycle #6  Empiric antibiotics with Levaquin    Diarrhea   Mild      Needs 2 D echo q 3 months for her 2 directed therapy    Next 2D echo is due 11/15/24.  This has been scheduled    Continue treatment  On chemotherapy requiring intensive monitoring.  Follow-up in 3 weeks    Reviewed PET scan with patient in detail          Follow-up 4 to 5 weeks from now or earlier as needed    At the next visit [facility]      I spent 40 total minutes, face-to-face, caring for Tracy today. 90% of this time involved counseling and/or coordination of care as documented within this note.

## 2024-10-17 NOTE — PROGRESS NOTES
Pt here for labs and MD f/u. Port accessed and flushed with good blood return noted. 10cc of blood wasted prior to specimen collection. Blood specimen obtained and sent to lab for processing per protocol.  Port flushed with saline and heparin prior to needle removal. Pt sent back to waiting room and [facility] staff notified she is ready for MD.

## 2024-10-17 NOTE — PATIENT INSTRUCTIONS
Kadcyla - Ado-Trastuzumab Emtansine Injection  What is this medication?  ADO-TRASTUZUMAB EMTANSINE (ADD oh yazmin TOO [facility] mab em TAN zine) treats breast cancer. It works by blocking a protein that causes cancer cells to grow and multiply. This helps to slow or stop the spread of cancer cells.    This medicine may be used for other purposes; ask your health care provider or pharmacist if you have questions.    COMMON BRAND NAME(S): Kadcyla    What should I tell my care team before I take this medication?  They need to know if you have any of these conditions:    Heart failure  Liver disease  Low platelet levels  Lung disease  Tingling of the fingers or toes or other nerve disorder  An unusual or allergic reaction to ado-trastuzumab emtansine, other medications, foods, dyes, or preservatives  Pregnant or trying to get pregnant  Breast-feeding  How should I use this medication?  This medication is infused into a vein. It is given by your care team in a hospital or clinic setting.    Talk to your care team about the use of this medication in children. Special care may be needed.    Overdosage: If you think you have taken too much of this medicine contact a poison control center or emergency room at once.    NOTE: This medicine is only for you. Do not share this medicine with others.    What if I miss a dose?  Keep appointments for follow-up doses. It is important not to miss your dose. Call your care team if you are unable to keep an appointment.    What may interact with this medication?  Atazanavir  Boceprevir  Clarithromycin  Dalfopristin; quinupristin  Delavirdine  Indinavir  Isoniazid, INH  Itraconazole  Ketoconazole  Nefazodone  Nelfinavir  Ritonavir  Telaprevir  Telithromycin  Tipranavir  Voriconazole  This list may not describe all possible interactions. Give your health care provider a list of all the medicines, herbs, non-prescription drugs, or dietary supplements you use. Also tell them if you smoke, drink  alcohol, or use illegal drugs. Some items may interact with your medicine.    What should I watch for while using this medication?  This medication may make you feel generally unwell. This is not uncommon, as chemotherapy can affect healthy cells as well as cancer cells. Report any side effects. Continue your course of treatment even though you feel ill unless your care team tells you to stop.    You may need blood work while taking this medication.    This medication may increase your risk to bruise or bleed. Call your care team if you notice any unusual bleeding.    Be careful brushing or flossing your teeth or using a toothpick because you may get an infection or bleed more easily. If you have any dental work done, tell your dentist you are receiving this medication.    Talk to your care team if you may be pregnant. Serious birth defects can occur if you take this medication during pregnancy and for 7 months after the last dose. You will need a negative pregnancy test before starting this medication. Contraception is recommended while taking this medication and for 7 months after the last dose. Your care team can help you find the option that works for you.    If your partner can get pregnant, use a condom during sex while taking this medication and for 4 months after the last dose.    Do not breastfeed while taking this medication and for 7 months after the last dose.    This medication may cause infertility. Talk to your care team if you are concerned with your fertility.    What side effects may I notice from receiving this medication?  Side effects that you should report to your care team as soon as possible:    Allergic reactions--skin rash, itching, hives, swelling of the face, lips, tongue, or throat  Bleeding--bloody or black, tar-like stools, vomiting blood or brown material that looks like coffee grounds, red or dark brown urine, small red or purple spots on skin, unusual bruising or bleeding  Dry  cough, shortness of breath or trouble breathing  Heart failure--shortness of breath, swelling of the ankles, feet, or hands, sudden weight gain, unusual weakness or fatigue  Infusion reactions--chest pain, shortness of breath or trouble breathing, feeling faint or lightheaded  Liver injury--right upper belly pain, loss of appetite, nausea, light-colored stool, dark yellow or brown urine, yellowing skin or eyes, unusual weakness or fatigue  Pain, tingling, or numbness in the hands or feet  Painful swelling, warmth, or redness of the skin, blisters or sores at the infusion site  Side effects that usually do not require medical attention (report to your care team if they continue or are bothersome):    Constipation  Fatigue  Headache  Muscle pain  Nausea  This list may not describe all possible side effects. Call your doctor for medical advice about side effects. You may report side effects to FDA at 6-427-FDA-3474.    Where should I keep my medication?  This medication is given in a hospital or clinic. It will not be stored at home.    NOTE: This sheet is a summary. It may not cover all possible information. If you have questions about this medicine, talk to your doctor, pharmacist, or health care provider.    © 2024 [employer]/Gold Standard (2023-05-05 00:00:00)    Additional Information From [employer]you About Kadcyla  Self-Care Tips:  Drink at least two to three quarts of fluid every 24 hours, unless you are instructed otherwise.    You may be at risk of infection so try to avoid crowds or people with colds, and report fever or any other signs of infection immediately to your health care provider.    Wash your hands often.    To help treat/prevent mouth sores, use a soft toothbrush, and rinse three times a day with 1 teaspoon of baking soda mixed with 8 ounces of water.    Use an electric razor and a soft toothbrush to minimize bleeding.    Avoid contact sports or activities that could cause injury.    To reduce  nausea, take anti-nausea medications as prescribed by your doctor, and eat small, frequent meals.    Follow regimen of anti-diarrhea medication as prescribed by your health care professional.    Eat foods that may help reduce diarrhea (see managing side effects - diarrhea).    Acetaminophen or ibuprofen may help relieve discomfort from fever, headache and/or generalized aches and pains. However, be sure to talk with your doctor before taking it.    Avoid sun exposure. Wear SPF 15 (or higher) sun block and protective clothing.    In general, drinking alcoholic beverages should be kept to a minimum or avoided completely. You should discuss this with your doctor.    Get plenty of rest.    Maintain good nutrition.    Remain active as you are able. Gentle exercise is encouraged such as a daily walk.    If you experience symptoms or side effects, be sure to discuss them with your health care team. They can prescribe medications and/or offer other suggestions that are effective in managing such problems.      When to contact your doctor or health care provider:  Contact your health care provider immediately, day or night and go to the emergency room, if you should experience any of the following symptoms:    Fever of 100.4º F (38º C) or higher, chills (possible signs of infection)  Trouble breathing, bad cough, swelling of the ankles/legs, palpitations, weight gain of more than 5 pounds in 24 hours, dizziness or loss of consciousness  Chest pain or pressure  The following symptoms require medical attention, but are not an emergency. Contact your health care provider within 24 hours of noticing any of the following:    Signs of infection: Fever, chills, very bad sore throat, ear or sinus pain, cough with or without sputum, pain with passing urine, wounds that will not heal  Yellowing of the eyes or skin  Dark urine  Right-sided abdominal pain  Nausea (interferes with ability to eat and unrelieved with prescribed  medication)  Vomiting (vomiting more than 4-5 times in a 24 hour period)  Diarrhea (4-6 episodes in a 24-hour period)  Dizziness or lightheadedness  Bad headache  Unusual bleeding or bruising  Extreme fatigue (unable to carry on self-care activities)  Muscle weakness  Mouth sores (painful redness, swelling or ulcers)  Always inform your health care provider if you experience any unusual symptoms    Some severe but rare possible side effects of Kadcyla™ include:    Liver problems (liver failure)  Heart-decrease in left ventricular ejection fraction (LVEF)

## 2024-10-18 ENCOUNTER — HOSPITAL ENCOUNTER (OUTPATIENT)
Dept: RADIATION ONCOLOGY | Facility: HOSPITAL | Age: 64
Discharge: HOME | End: 2024-10-18

## 2024-10-18 LAB
RAD ONC ARIA COURSE ID: NORMAL
RAD ONC ARIA COURSE LAST TREATMENT DATE: NORMAL
RAD ONC ARIA COURSE START DATE: NORMAL
RAD ONC ARIA COURSE TREATMENT ELAPSED DAYS: 16
RAD ONC ARIA FIRST TREATMENT DATE: NORMAL
RAD ONC ARIA PLAN FRACTIONS TREATED TO DATE: 13
RAD ONC ARIA PLAN ID: NORMAL
RAD ONC ARIA PLAN PRESCRIBED DOSE PER FRACTION: 2.5 GY
RAD ONC ARIA PLAN PRIMARY REFERENCE POINT: NORMAL
RAD ONC ARIA PLAN TOTAL FRACTIONS PRESCRIBED: 20
RAD ONC ARIA PLAN TOTAL PRESCRIBED DOSE: 5000 CGY
RAD ONC ARIA REFERENCE POINT DOSAGE GIVEN TO DATE: 32.5 GY
RAD ONC ARIA REFERENCE POINT ID: NORMAL
RAD ONC ARIA REFERENCE POINT SESSION DOSAGE GIVEN: 2.5 GY

## 2024-10-18 PROCEDURE — 77386: CPT | Performed by: INTERNAL MEDICINE

## 2024-10-18 PROCEDURE — 77014 CHG CT GUIDANCE RADIATION THERAPY FLDS PLACEMENT: CPT | Performed by: INTERNAL MEDICINE

## 2024-10-21 ENCOUNTER — RADIATION ONCOLOGY WEEKLY ASSESSMENT (OUTPATIENT)
Dept: RADIATION ONCOLOGY | Facility: HOSPITAL | Age: 64
End: 2024-10-21
Payer: COMMERCIAL

## 2024-10-21 ENCOUNTER — HOSPITAL ENCOUNTER (OUTPATIENT)
Dept: RADIATION ONCOLOGY | Facility: HOSPITAL | Age: 64
Discharge: HOME | End: 2024-10-21
Payer: COMMERCIAL

## 2024-10-21 VITALS
BODY MASS INDEX: 30.73 KG/M2 | OXYGEN SATURATION: 96 % | HEART RATE: 105 BPM | TEMPERATURE: 97.4 F | DIASTOLIC BLOOD PRESSURE: 69 MMHG | WEIGHT: 179 LBS | RESPIRATION RATE: 20 BRPM | SYSTOLIC BLOOD PRESSURE: 102 MMHG

## 2024-10-21 DIAGNOSIS — C50.911 BREAST CANCER METASTASIZED TO INTRATHORACIC LYMPH NODE, RIGHT: Primary | ICD-10-CM

## 2024-10-21 DIAGNOSIS — C77.1 BREAST CANCER METASTASIZED TO INTRATHORACIC LYMPH NODE, RIGHT: Primary | ICD-10-CM

## 2024-10-21 LAB
RAD ONC ARIA COURSE ID: NORMAL
RAD ONC ARIA COURSE LAST TREATMENT DATE: NORMAL
RAD ONC ARIA COURSE START DATE: NORMAL
RAD ONC ARIA COURSE TREATMENT ELAPSED DAYS: 19
RAD ONC ARIA FIRST TREATMENT DATE: NORMAL
RAD ONC ARIA PLAN FRACTIONS TREATED TO DATE: 14
RAD ONC ARIA PLAN ID: NORMAL
RAD ONC ARIA PLAN PRESCRIBED DOSE PER FRACTION: 2.5 GY
RAD ONC ARIA PLAN PRIMARY REFERENCE POINT: NORMAL
RAD ONC ARIA PLAN TOTAL FRACTIONS PRESCRIBED: 20
RAD ONC ARIA PLAN TOTAL PRESCRIBED DOSE: 5000 CGY
RAD ONC ARIA REFERENCE POINT DOSAGE GIVEN TO DATE: 35 GY
RAD ONC ARIA REFERENCE POINT ID: NORMAL
RAD ONC ARIA REFERENCE POINT SESSION DOSAGE GIVEN: 2.5 GY

## 2024-10-21 PROCEDURE — 77014 CHG CT GUIDANCE RADIATION THERAPY FLDS PLACEMENT: CPT | Performed by: INTERNAL MEDICINE

## 2024-10-21 PROCEDURE — FACE2FACE: Performed by: INTERNAL MEDICINE

## 2024-10-21 PROCEDURE — 77386: CPT | Performed by: INTERNAL MEDICINE

## 2024-10-21 RX ORDER — LIDOCAINE HYDROCHLORIDE 20 MG/ML
10 SOLUTION OROPHARYNGEAL AS NEEDED
Qty: 500 ML | Refills: 3 | Status: SHIPPED | OUTPATIENT
Start: 2024-10-21

## 2024-10-21 RX ORDER — SUCRALFATE ORAL 1 G/10ML
1 SUSPENSION ORAL
Qty: 500 ML | Refills: 3 | Status: SHIPPED | OUTPATIENT
Start: 2024-10-21

## 2024-10-21 NOTE — PROGRESS NOTES
[facility] RADIATION ONCOLOGY  ON-TREATMENT VISIT NOTE    NAME: Tracy Reddy  YOB: 1960  MRN #: [id number]  DATE OF SERVICE: 10/21/2024  PRESCRIBING PHYSICIAN: No care team member to display     DIAGNOSIS:      ICD-10-CM ICD-9-CM   1. Breast cancer metastasized to intrathoracic lymph node, right  C50.911 174.9    C77.1 196.1      RADIATION THERAPY VISIT:  Continue radiation therapy, Dosimetry plan remains acceptable, Films reviewed and remains acceptable, Pain assessed, Pain management planned, Radiation dose schedule reviewed and remains acceptable, Radiation technique remains acceptable, and Symptoms within expected range    Radiation Treatments       Active   Plans   RtLung   Most recent treatment: Dose planned: 250 cGy (fraction 13 on 10/18/2024)   Total: Dose planned: 5,000 cGy (20 fractions)   Elapsed Days: 16      Reference Points   RtLung   Most recent treatment: Dose given: 250 cGy (on 10/18/2024)   Total: Dose given: 3,250 cGy   Elapsed Days: 16                    [] Concurrent Chemo   Regimen:       PHYSICAL ASSESSMENT         Vitals:    10/21/24 0812   BP: 102/69   Pulse: 105   Resp: 20   Temp: 97.4 °F (36.3 °C)   SpO2: 96%      Wt Readings from Last 3 Encounters:   10/21/24 81.2 kg (179 lb)   10/17/24 81.2 kg (179 lb)   10/15/24 81.6 kg (180 lb)     Fully active, able to carry on all pre-disease performance without restriction = 0    We examined the relevant areas: yes  Findings are within the expected range for this stage of treatment: yes    ACTION ITEMS     Patient tolerating treatment well and as expected for this stage in their treatment and Continue radiation therapy as planned    Estimated Completion Date: 10/29/2024    Anticipatory guidance provided    Prescription sent in for Carafate and viscous lidocaine    Plan for 3 month post treatment imaging with CT chest, abdomen, and pelvis      No care team member to display   Radiation Oncology  [facility]  [facility]

## 2024-10-22 ENCOUNTER — HOSPITAL ENCOUNTER (OUTPATIENT)
Dept: RADIATION ONCOLOGY | Facility: HOSPITAL | Age: 64
Discharge: HOME | End: 2024-10-22

## 2024-10-22 LAB
RAD ONC ARIA COURSE ID: NORMAL
RAD ONC ARIA COURSE LAST TREATMENT DATE: NORMAL
RAD ONC ARIA COURSE START DATE: NORMAL
RAD ONC ARIA COURSE TREATMENT ELAPSED DAYS: 20
RAD ONC ARIA FIRST TREATMENT DATE: NORMAL
RAD ONC ARIA PLAN FRACTIONS TREATED TO DATE: 15
RAD ONC ARIA PLAN ID: NORMAL
RAD ONC ARIA PLAN PRESCRIBED DOSE PER FRACTION: 2.5 GY
RAD ONC ARIA PLAN PRIMARY REFERENCE POINT: NORMAL
RAD ONC ARIA PLAN TOTAL FRACTIONS PRESCRIBED: 20
RAD ONC ARIA PLAN TOTAL PRESCRIBED DOSE: 5000 CGY
RAD ONC ARIA REFERENCE POINT DOSAGE GIVEN TO DATE: 37.5 GY
RAD ONC ARIA REFERENCE POINT ID: NORMAL
RAD ONC ARIA REFERENCE POINT SESSION DOSAGE GIVEN: 2.5 GY

## 2024-10-22 PROCEDURE — 77386: CPT | Performed by: INTERNAL MEDICINE

## 2024-10-22 PROCEDURE — 77014 CHG CT GUIDANCE RADIATION THERAPY FLDS PLACEMENT: CPT | Performed by: INTERNAL MEDICINE

## 2024-10-23 ENCOUNTER — HOSPITAL ENCOUNTER (OUTPATIENT)
Dept: RADIATION ONCOLOGY | Facility: HOSPITAL | Age: 64
Discharge: HOME | End: 2024-10-23

## 2024-10-23 LAB
RAD ONC ARIA COURSE ID: NORMAL
RAD ONC ARIA COURSE LAST TREATMENT DATE: NORMAL
RAD ONC ARIA COURSE START DATE: NORMAL
RAD ONC ARIA COURSE TREATMENT ELAPSED DAYS: 21
RAD ONC ARIA FIRST TREATMENT DATE: NORMAL
RAD ONC ARIA PLAN FRACTIONS TREATED TO DATE: 16
RAD ONC ARIA PLAN ID: NORMAL
RAD ONC ARIA PLAN PRESCRIBED DOSE PER FRACTION: 2.5 GY
RAD ONC ARIA PLAN PRIMARY REFERENCE POINT: NORMAL
RAD ONC ARIA PLAN TOTAL FRACTIONS PRESCRIBED: 20
RAD ONC ARIA PLAN TOTAL PRESCRIBED DOSE: 5000 CGY
RAD ONC ARIA REFERENCE POINT DOSAGE GIVEN TO DATE: 40 GY
RAD ONC ARIA REFERENCE POINT ID: NORMAL
RAD ONC ARIA REFERENCE POINT SESSION DOSAGE GIVEN: 2.5 GY

## 2024-10-23 PROCEDURE — 77386: CPT | Performed by: INTERNAL MEDICINE

## 2024-10-23 PROCEDURE — 77014 CHG CT GUIDANCE RADIATION THERAPY FLDS PLACEMENT: CPT | Performed by: INTERNAL MEDICINE

## 2024-10-23 PROCEDURE — 77427 RADIATION TX MANAGEMENT X5: CPT | Performed by: INTERNAL MEDICINE

## 2024-10-24 ENCOUNTER — HOSPITAL ENCOUNTER (OUTPATIENT)
Dept: RADIATION ONCOLOGY | Facility: HOSPITAL | Age: 64
Discharge: HOME | End: 2024-10-24

## 2024-10-24 LAB
RAD ONC ARIA COURSE ID: NORMAL
RAD ONC ARIA COURSE LAST TREATMENT DATE: NORMAL
RAD ONC ARIA COURSE START DATE: NORMAL
RAD ONC ARIA COURSE TREATMENT ELAPSED DAYS: 22
RAD ONC ARIA FIRST TREATMENT DATE: NORMAL
RAD ONC ARIA PLAN FRACTIONS TREATED TO DATE: 17
RAD ONC ARIA PLAN ID: NORMAL
RAD ONC ARIA PLAN PRESCRIBED DOSE PER FRACTION: 2.5 GY
RAD ONC ARIA PLAN PRIMARY REFERENCE POINT: NORMAL
RAD ONC ARIA PLAN TOTAL FRACTIONS PRESCRIBED: 20
RAD ONC ARIA PLAN TOTAL PRESCRIBED DOSE: 5000 CGY
RAD ONC ARIA REFERENCE POINT DOSAGE GIVEN TO DATE: 42.5 GY
RAD ONC ARIA REFERENCE POINT ID: NORMAL
RAD ONC ARIA REFERENCE POINT SESSION DOSAGE GIVEN: 2.5 GY

## 2024-10-24 PROCEDURE — 77014 CHG CT GUIDANCE RADIATION THERAPY FLDS PLACEMENT: CPT | Performed by: INTERNAL MEDICINE

## 2024-10-24 PROCEDURE — 77386: CPT | Performed by: INTERNAL MEDICINE

## 2024-10-24 PROCEDURE — 77336 RADIATION PHYSICS CONSULT: CPT | Performed by: INTERNAL MEDICINE

## 2024-10-25 ENCOUNTER — HOSPITAL ENCOUNTER (OUTPATIENT)
Dept: RADIATION ONCOLOGY | Facility: HOSPITAL | Age: 64
Discharge: HOME | End: 2024-10-25

## 2024-10-25 LAB
RAD ONC ARIA COURSE ID: NORMAL
RAD ONC ARIA COURSE LAST TREATMENT DATE: NORMAL
RAD ONC ARIA COURSE START DATE: NORMAL
RAD ONC ARIA COURSE TREATMENT ELAPSED DAYS: 23
RAD ONC ARIA FIRST TREATMENT DATE: NORMAL
RAD ONC ARIA PLAN FRACTIONS TREATED TO DATE: 18
RAD ONC ARIA PLAN ID: NORMAL
RAD ONC ARIA PLAN PRESCRIBED DOSE PER FRACTION: 2.5 GY
RAD ONC ARIA PLAN PRIMARY REFERENCE POINT: NORMAL
RAD ONC ARIA PLAN TOTAL FRACTIONS PRESCRIBED: 20
RAD ONC ARIA PLAN TOTAL PRESCRIBED DOSE: 5000 CGY
RAD ONC ARIA REFERENCE POINT DOSAGE GIVEN TO DATE: 45 GY
RAD ONC ARIA REFERENCE POINT ID: NORMAL
RAD ONC ARIA REFERENCE POINT SESSION DOSAGE GIVEN: 2.5 GY

## 2024-10-25 PROCEDURE — 77386: CPT | Performed by: INTERNAL MEDICINE

## 2024-10-25 PROCEDURE — 77014 CHG CT GUIDANCE RADIATION THERAPY FLDS PLACEMENT: CPT | Performed by: INTERNAL MEDICINE

## 2024-10-28 ENCOUNTER — HOSPITAL ENCOUNTER (OUTPATIENT)
Dept: RADIATION ONCOLOGY | Facility: HOSPITAL | Age: 64
Discharge: HOME | End: 2024-10-28
Payer: COMMERCIAL

## 2024-10-28 ENCOUNTER — RADIATION ONCOLOGY WEEKLY ASSESSMENT (OUTPATIENT)
Dept: RADIATION ONCOLOGY | Facility: HOSPITAL | Age: 64
End: 2024-10-28
Payer: COMMERCIAL

## 2024-10-28 VITALS
DIASTOLIC BLOOD PRESSURE: 68 MMHG | SYSTOLIC BLOOD PRESSURE: 99 MMHG | WEIGHT: 179 LBS | RESPIRATION RATE: 18 BRPM | TEMPERATURE: 97.7 F | OXYGEN SATURATION: 96 % | BODY MASS INDEX: 30.73 KG/M2 | HEART RATE: 101 BPM

## 2024-10-28 DIAGNOSIS — C77.1 BREAST CANCER METASTASIZED TO INTRATHORACIC LYMPH NODE, RIGHT: Primary | ICD-10-CM

## 2024-10-28 DIAGNOSIS — C50.911 BREAST CANCER METASTASIZED TO INTRATHORACIC LYMPH NODE, RIGHT: Primary | ICD-10-CM

## 2024-10-28 LAB
RAD ONC ARIA COURSE ID: NORMAL
RAD ONC ARIA COURSE LAST TREATMENT DATE: NORMAL
RAD ONC ARIA COURSE START DATE: NORMAL
RAD ONC ARIA COURSE TREATMENT ELAPSED DAYS: 26
RAD ONC ARIA FIRST TREATMENT DATE: NORMAL
RAD ONC ARIA PLAN FRACTIONS TREATED TO DATE: 19
RAD ONC ARIA PLAN ID: NORMAL
RAD ONC ARIA PLAN PRESCRIBED DOSE PER FRACTION: 2.5 GY
RAD ONC ARIA PLAN PRIMARY REFERENCE POINT: NORMAL
RAD ONC ARIA PLAN TOTAL FRACTIONS PRESCRIBED: 20
RAD ONC ARIA PLAN TOTAL PRESCRIBED DOSE: 5000 CGY
RAD ONC ARIA REFERENCE POINT DOSAGE GIVEN TO DATE: 47.5 GY
RAD ONC ARIA REFERENCE POINT ID: NORMAL
RAD ONC ARIA REFERENCE POINT SESSION DOSAGE GIVEN: 2.5 GY

## 2024-10-28 PROCEDURE — 77014 CHG CT GUIDANCE RADIATION THERAPY FLDS PLACEMENT: CPT | Performed by: INTERNAL MEDICINE

## 2024-10-28 PROCEDURE — 77386: CPT | Performed by: INTERNAL MEDICINE

## 2024-10-28 NOTE — PROGRESS NOTES
[facility] RADIATION ONCOLOGY  ON-TREATMENT VISIT NOTE    NAME: Tracy Reddy  YOB: 1960  MRN #: [id number]  DATE OF SERVICE: 10/28/2024  PRESCRIBING PHYSICIAN: No care team member to display     DIAGNOSIS:      ICD-10-CM ICD-9-CM   1. Breast cancer metastasized to intrathoracic lymph node, right  C50.911 174.9    C77.1 196.1      RADIATION THERAPY VISIT:  Continue radiation therapy, Dosimetry plan remains acceptable, Films reviewed and remains acceptable, Pain assessed, Pain management planned, Radiation dose schedule reviewed and remains acceptable, Radiation technique remains acceptable, and Symptoms within expected range    Radiation Treatments       Active   Plans   RtLung   Most recent treatment: Dose planned: 250 cGy (fraction 19 on 10/28/2024)   Total: Dose planned: 5,000 cGy (20 fractions)   Elapsed Days: 26      Reference Points   RtLung   Most recent treatment: Dose given: 250 cGy (on 10/28/2024)   Total: Dose given: 4,750 cGy   Elapsed Days: 26                    [] Concurrent Chemo   Regimen:  n/a     PHYSICAL ASSESSMENT         Vitals:    10/28/24 0818   BP: 99/68   Pulse: 101   Resp: 18   Temp: 97.7 °F (36.5 °C)   SpO2: 96%      Wt Readings from Last 3 Encounters:   10/28/24 81.2 kg (179 lb)   10/21/24 81.2 kg (179 lb)   10/17/24 81.2 kg (179 lb)     Restricted in physically strenuous activity but ambulatory and able to carry out work of a light or sedentary nature, e.g., light house work, office work = 1    We examined the relevant areas: yes  Findings are within the expected range for this stage of treatment: yes    ACTION ITEMS     Patient tolerating treatment well and as expected for this stage in their treatment and Continue radiation therapy as planned    Estimated Completion Date: 10/29/2024    Anticipatory guidance provided.    Discussed plans for 3 months post treatment imaging and subsequent follow-up.       No care team member to display   Radiation  Oncology  [facility]

## 2024-10-29 ENCOUNTER — HOSPITAL ENCOUNTER (OUTPATIENT)
Dept: RADIATION ONCOLOGY | Facility: HOSPITAL | Age: 64
Discharge: HOME | End: 2024-10-29
Payer: COMMERCIAL

## 2024-10-29 ENCOUNTER — RADIATION ONCOLOGY WEEKLY ASSESSMENT (OUTPATIENT)
Dept: RADIATION ONCOLOGY | Facility: HOSPITAL | Age: 64
End: 2024-10-29
Payer: COMMERCIAL

## 2024-10-29 DIAGNOSIS — C78.00 CARCINOMA OF RIGHT BREAST METASTATIC TO LUNG: Primary | ICD-10-CM

## 2024-10-29 DIAGNOSIS — C50.911 BREAST CANCER METASTASIZED TO INTRATHORACIC LYMPH NODE, RIGHT: Primary | ICD-10-CM

## 2024-10-29 DIAGNOSIS — C78.00 CARCINOMA OF RIGHT BREAST METASTATIC TO LUNG: ICD-10-CM

## 2024-10-29 DIAGNOSIS — C50.911 BREAST CANCER METASTASIZED TO INTRATHORACIC LYMPH NODE, RIGHT: ICD-10-CM

## 2024-10-29 DIAGNOSIS — C77.1 BREAST CANCER METASTASIZED TO INTRATHORACIC LYMPH NODE, RIGHT: Primary | ICD-10-CM

## 2024-10-29 DIAGNOSIS — C50.911 CARCINOMA OF RIGHT BREAST METASTATIC TO LUNG: Primary | ICD-10-CM

## 2024-10-29 DIAGNOSIS — C50.911 CARCINOMA OF RIGHT BREAST METASTATIC TO LUNG: ICD-10-CM

## 2024-10-29 DIAGNOSIS — C77.1 BREAST CANCER METASTASIZED TO INTRATHORACIC LYMPH NODE, RIGHT: ICD-10-CM

## 2024-10-29 LAB
RAD ONC ARIA COURSE ID: NORMAL
RAD ONC ARIA COURSE LAST TREATMENT DATE: NORMAL
RAD ONC ARIA COURSE START DATE: NORMAL
RAD ONC ARIA COURSE TREATMENT ELAPSED DAYS: 27
RAD ONC ARIA FIRST TREATMENT DATE: NORMAL
RAD ONC ARIA PLAN FRACTIONS TREATED TO DATE: 20
RAD ONC ARIA PLAN ID: NORMAL
RAD ONC ARIA PLAN PRESCRIBED DOSE PER FRACTION: 2.5 GY
RAD ONC ARIA PLAN PRIMARY REFERENCE POINT: NORMAL
RAD ONC ARIA PLAN TOTAL FRACTIONS PRESCRIBED: 20
RAD ONC ARIA PLAN TOTAL PRESCRIBED DOSE: 5000 CGY
RAD ONC ARIA REFERENCE POINT DOSAGE GIVEN TO DATE: 50 GY
RAD ONC ARIA REFERENCE POINT ID: NORMAL
RAD ONC ARIA REFERENCE POINT SESSION DOSAGE GIVEN: 2.5 GY

## 2024-10-29 PROCEDURE — 77386: CPT | Performed by: INTERNAL MEDICINE

## 2024-10-29 PROCEDURE — 77014 CHG CT GUIDANCE RADIATION THERAPY FLDS PLACEMENT: CPT | Performed by: INTERNAL MEDICINE

## 2024-10-29 NOTE — PATIENT INSTRUCTIONS
RADIATION THERAPY DISCHARGE INSTRUCTIONS  Chest    CONGRATULATIONS! You completed 20 radiation treatments for treatment of your metastatic breast cancer to your right lung and intrathoracic lymph nodes. These discharge instructions are important for you to follow until your one-month follow up appointment with your radiation oncologist. Please make sure to review these instructions and call the Radiation Oncology Department if you have any questions or concerns with symptoms you may experience. Thank you for trusting us with your cancer treatment!    DIET  Eat a regular, well balanced diet that is high in protein such as meat, eggs, cheese, and nut butters.  Drink 48 to 64 ounces of fluid daily.  Use nutritional supplements if you are not able to eat full meals.  Monitor your weight and report continued weight loss to your doctor.    MEDICATIONS  Use Tylenol as needed to decrease discomfort and irritation to treatment area.  Take pain medications only as prescribed.  Take a laxative or stool softener as needed to prevent constipation due to pain medications.  (If applicable) Use Sharla's Magic Mouthwash or other oral pain relief medication before meals and before taking medications as needed to ease the discomfort of swallowing.    SKIN CARE  Wash treated skin gently with your hands using a mild, non-drying soap such as Dove® or Aveeno® until skin returns to normal.  Pat skin dry - do not rub.  Keep treated skin moist with twice daily applications of Eucerin® or Aquaphor®.  Always protect your treated skin when outdoors by wearing protective clothing and applying sunscreen SPF 15 or higher at least 30 minutes before going outdoors and reapply frequently.    ACTIVITY  Fatigue is a normal side effect of radiation therapy and should improve over time.  Alternate rest and activity.  Exercise such as walking may help to improve your fatigue.    FOLLOW-UP  Continue follow-up appointments with all other doctors as  necessary.  Call your radiation oncology doctor if you are concerned with any side effects you are experiencing.    WHEN TO CALL YOUR RADIATION ONCOLOGIST OR NURSE: [phone]  You have a fever of 100.4 OF or higher.  You have chills.  Your pain or discomfort is getting worse.  Your skin in the treatment area is getting more red or swollen, feels hard or hot, has a rash or blisters, and/or is itchy.  You see drainage (liquid) coming from your skin in the treatment area.  You see any new open areas (wounds) or changes to your skin.  You have any questions or concerns.    _______________________________________________________________________    Completed by: Jennifer Morgan RN on 10/29/2024 at 07:48 EDT

## 2024-10-29 NOTE — PROGRESS NOTES
RADIATION THERAPY COMPLETION and DISCHARGE     Tracy Reddy completed radiation therapy on 10/29/2024 for No primary diagnosis found.. The summary of her treatment is as follows:    TREATMENT SITE:   RtLung START DATE:   10/02/2024   TOTAL DOSE (cGy):   5000 END DATE:   10/29/2024    DOSE/FRACTION:   250 ELAPSED DAYS:   28   TOTAL FACTIONS:   20 PHYSICIAN:    Dr. Srinivas Wilburn     Tracy is scheduled for a 3-month follow-up appointment with Dr. Wilburn on 1/29/2025 at 9:00AM.  _______________________________________________________________________    The following instructions were provided to the patient and/or family in their printed after visit summary (AVS) as well as discussed in-person by the radiation oncology nurse or medical assistant. The patient and/or family had the opportunity to ask questions and verbalized their questions were adequately answered. Encouraged patient to contact our department with any questions or concerns.      RADIATION THERAPY DISCHARGE INSTRUCTIONS  Chest    CONGRATULATIONS! You completed 20 radiation treatments for treatment of your metastatic breast cancer to your right lung and intrathoracic lymph nodes. These discharge instructions are important for you to follow until your one-month follow up appointment with your radiation oncologist. Please make sure to review these instructions and call the Radiation Oncology Department if you have any questions or concerns with symptoms you may experience. Thank you for trusting us with your cancer treatment!    DIET  Eat a regular, well balanced diet that is high in protein such as meat, eggs, cheese, and nut butters.  Drink 48 to 64 ounces of fluid daily.  Use nutritional supplements if you are not able to eat full meals.  Monitor your weight and report continued weight loss to your doctor.    MEDICATIONS  Use Tylenol as needed to decrease discomfort and irritation to treatment area.  Take pain medications only as  prescribed.  Take a laxative or stool softener as needed to prevent constipation due to pain medications.  (If applicable) Use Sharla's Magic Mouthwash or other oral pain relief medication before meals and before taking medications as needed to ease the discomfort of swallowing.    SKIN CARE  Wash treated skin gently with your hands using a mild, non-drying soap such as Dove® or Aveeno® until skin returns to normal.  Pat skin dry - do not rub.  Keep treated skin moist with twice daily applications of Eucerin® or Aquaphor®.  Always protect your treated skin when outdoors by wearing protective clothing and applying sunscreen SPF 15 or higher at least 30 minutes before going outdoors and reapply frequently.    ACTIVITY  Fatigue is a normal side effect of radiation therapy and should improve over time.  Alternate rest and activity.  Exercise such as walking may help to improve your fatigue.    FOLLOW-UP  Continue follow-up appointments with all other doctors as necessary.  Call your radiation oncology doctor if you are concerned with any side effects you are experiencing.    WHEN TO CALL YOUR RADIATION ONCOLOGIST OR NURSE: [phone]  You have a fever of 100.4 OF or higher.  You have chills.  Your pain or discomfort is getting worse.  Your skin in the treatment area is getting more red or swollen, feels hard or hot, has a rash or blisters, and/or is itchy.  You see drainage (liquid) coming from your skin in the treatment area.  You see any new open areas (wounds) or changes to your skin.  You have any questions or concerns.      _______________________________________________________________________    Completed by: Jennifer Morgan RN, Radiation Oncology Nurse on 10/29/24 at 07:49 EDT

## 2024-10-31 LAB
RAD ONC ARIA COURSE END DATE: NORMAL
RAD ONC ARIA COURSE ID: NORMAL
RAD ONC ARIA COURSE LAST TREATMENT DATE: NORMAL
RAD ONC ARIA COURSE START DATE: NORMAL
RAD ONC ARIA COURSE TREATMENT ELAPSED DAYS: 27
RAD ONC ARIA FIRST TREATMENT DATE: NORMAL
RAD ONC ARIA PLAN FRACTIONS TREATED TO DATE: 20
RAD ONC ARIA PLAN ID: NORMAL
RAD ONC ARIA PLAN NAME: NORMAL
RAD ONC ARIA PLAN PRESCRIBED DOSE PER FRACTION: 2.5 GY
RAD ONC ARIA PLAN PRIMARY REFERENCE POINT: NORMAL
RAD ONC ARIA PLAN TOTAL FRACTIONS PRESCRIBED: 20
RAD ONC ARIA PLAN TOTAL PRESCRIBED DOSE: 5000 CGY
RAD ONC ARIA REFERENCE POINT DOSAGE GIVEN TO DATE: 50 GY
RAD ONC ARIA REFERENCE POINT ID: NORMAL

## 2024-11-03 DIAGNOSIS — Z17.0 BILATERAL MALIGNANT NEOPLASM OF BREAST IN FEMALE, ESTROGEN RECEPTOR POSITIVE, UNSPECIFIED SITE OF BREAST: Primary | ICD-10-CM

## 2024-11-03 DIAGNOSIS — C50.912 BILATERAL MALIGNANT NEOPLASM OF BREAST IN FEMALE, ESTROGEN RECEPTOR POSITIVE, UNSPECIFIED SITE OF BREAST: Primary | ICD-10-CM

## 2024-11-03 DIAGNOSIS — C50.911 BILATERAL MALIGNANT NEOPLASM OF BREAST IN FEMALE, ESTROGEN RECEPTOR POSITIVE, UNSPECIFIED SITE OF BREAST: Primary | ICD-10-CM

## 2024-11-03 RX ORDER — SODIUM CHLORIDE 9 MG/ML
20 INJECTION, SOLUTION INTRAVENOUS ONCE
OUTPATIENT
Start: 2024-11-05

## 2024-11-04 ENCOUNTER — HOSPITAL ENCOUNTER (OUTPATIENT)
Dept: CARDIOLOGY | Facility: HOSPITAL | Age: 64
Discharge: HOME | End: 2024-11-04
Admitting: INTERNAL MEDICINE
Payer: COMMERCIAL

## 2024-11-04 VITALS
WEIGHT: 179 LBS | DIASTOLIC BLOOD PRESSURE: 61 MMHG | BODY MASS INDEX: 30.56 KG/M2 | HEART RATE: 99 BPM | HEIGHT: 64 IN | SYSTOLIC BLOOD PRESSURE: 112 MMHG

## 2024-11-04 DIAGNOSIS — Z51.11 ENCOUNTER FOR ANTINEOPLASTIC CHEMOTHERAPY: ICD-10-CM

## 2024-11-04 LAB
AORTIC DIMENSIONLESS INDEX: 0.89 (DI)
BH CV ECHO LEFT VENTRICLE GLOBAL LONGITUDINAL STRAIN: -18.1 %
BH CV ECHO MEAS - ACS: 1.79 CM
BH CV ECHO MEAS - AO MAX PG: 5 MMHG
BH CV ECHO MEAS - AO MEAN PG: 2.8 MMHG
BH CV ECHO MEAS - AO ROOT DIAM: 3.3 CM
BH CV ECHO MEAS - AO V2 MAX: 111.6 CM/SEC
BH CV ECHO MEAS - AO V2 VTI: 18.4 CM
BH CV ECHO MEAS - AVA(I,D): 2.19 CM2
BH CV ECHO MEAS - EDV(CUBED): 40.6 ML
BH CV ECHO MEAS - EDV(MOD-SP2): 65.5 ML
BH CV ECHO MEAS - EDV(MOD-SP4): 64.8 ML
BH CV ECHO MEAS - EF(MOD-BP): 59 %
BH CV ECHO MEAS - EF(MOD-SP2): 58 %
BH CV ECHO MEAS - EF(MOD-SP4): 56.6 %
BH CV ECHO MEAS - ESV(CUBED): 12.8 ML
BH CV ECHO MEAS - ESV(MOD-SP2): 27.6 ML
BH CV ECHO MEAS - ESV(MOD-SP4): 28.1 ML
BH CV ECHO MEAS - FS: 31.9 %
BH CV ECHO MEAS - IVS/LVPW: 0.94 CM
BH CV ECHO MEAS - IVSD: 0.85 CM
BH CV ECHO MEAS - LA A2CS (ATRIAL LENGTH): 4.5 CM
BH CV ECHO MEAS - LA A4C LENGTH: 5 CM
BH CV ECHO MEAS - LA DIMENSION: 3.3 CM
BH CV ECHO MEAS - LAT PEAK E' VEL: 10 CM/SEC
BH CV ECHO MEAS - LV DIASTOLIC VOL/BSA (35-75): 34.7 CM2
BH CV ECHO MEAS - LV MASS(C)D: 83.1 GRAMS
BH CV ECHO MEAS - LV MAX PG: 3.1 MMHG
BH CV ECHO MEAS - LV MEAN PG: 1.65 MMHG
BH CV ECHO MEAS - LV SYSTOLIC VOL/BSA (12-30): 15.1 CM2
BH CV ECHO MEAS - LV V1 MAX: 87.9 CM/SEC
BH CV ECHO MEAS - LV V1 VTI: 16.4 CM
BH CV ECHO MEAS - LVIDD: 3.4 CM
BH CV ECHO MEAS - LVIDS: 2.34 CM
BH CV ECHO MEAS - LVOT AREA: 2.45 CM2
BH CV ECHO MEAS - LVOT DIAM: 1.77 CM
BH CV ECHO MEAS - LVPWD: 0.9 CM
BH CV ECHO MEAS - MED PEAK E' VEL: 6 CM/SEC
BH CV ECHO MEAS - MV A MAX VEL: 79.2 CM/SEC
BH CV ECHO MEAS - MV DEC SLOPE: 383 CM/SEC2
BH CV ECHO MEAS - MV DEC TIME: 0.14 SEC
BH CV ECHO MEAS - MV E MAX VEL: 51.7 CM/SEC
BH CV ECHO MEAS - MV E/A: 0.65
BH CV ECHO MEAS - MV MAX PG: 3.2 MMHG
BH CV ECHO MEAS - MV MEAN PG: 1.22 MMHG
BH CV ECHO MEAS - MV V2 VTI: 14.8 CM
BH CV ECHO MEAS - MVA(VTI): 2.7 CM2
BH CV ECHO MEAS - PA ACC TIME: 0.09 SEC
BH CV ECHO MEAS - PA V2 MAX: 72.9 CM/SEC
BH CV ECHO MEAS - PULM A REVS DUR: 0.12 SEC
BH CV ECHO MEAS - PULM A REVS VEL: 32.8 CM/SEC
BH CV ECHO MEAS - PULM DIAS VEL: 49 CM/SEC
BH CV ECHO MEAS - PULM S/D: 1.04
BH CV ECHO MEAS - PULM SYS VEL: 50.8 CM/SEC
BH CV ECHO MEAS - QP/QS: 0.83
BH CV ECHO MEAS - RAP SYSTOLE: 3 MMHG
BH CV ECHO MEAS - RV MAX PG: 0.96 MMHG
BH CV ECHO MEAS - RV V1 MAX: 49.1 CM/SEC
BH CV ECHO MEAS - RV V1 VTI: 8.5 CM
BH CV ECHO MEAS - RVDD: 2.7 CM
BH CV ECHO MEAS - RVOT DIAM: 2.24 CM
BH CV ECHO MEAS - RVSP: 17 MMHG
BH CV ECHO MEAS - SV(LVOT): 40.1 ML
BH CV ECHO MEAS - SV(MOD-SP2): 38 ML
BH CV ECHO MEAS - SV(MOD-SP4): 36.7 ML
BH CV ECHO MEAS - SV(RVOT): 33.5 ML
BH CV ECHO MEAS - SVI(LVOT): 21.5 ML/M2
BH CV ECHO MEAS - SVI(MOD-SP2): 20.4 ML/M2
BH CV ECHO MEAS - SVI(MOD-SP4): 19.7 ML/M2
BH CV ECHO MEAS - TAPSE (>1.6): 1.8 CM
BH CV ECHO MEAS - TR MAX PG: 13.8 MMHG
BH CV ECHO MEAS - TR MAX VEL: 185.7 CM/SEC
BH CV ECHO MEASUREMENTS AVERAGE E/E' RATIO: 6.46
BH CV XLRA - TDI S': 8 CM/SEC
LEFT ATRIUM VOLUME INDEX: 19.8 ML/M2
LEFT ATRIUM VOLUME: 34 ML

## 2024-11-04 PROCEDURE — 93306 TTE W/DOPPLER COMPLETE: CPT | Performed by: STUDENT IN AN ORGANIZED HEALTH CARE EDUCATION/TRAINING PROGRAM

## 2024-11-04 PROCEDURE — 93356 MYOCRD STRAIN IMG SPCKL TRCK: CPT

## 2024-11-04 PROCEDURE — 93356 MYOCRD STRAIN IMG SPCKL TRCK: CPT | Performed by: STUDENT IN AN ORGANIZED HEALTH CARE EDUCATION/TRAINING PROGRAM

## 2024-11-04 PROCEDURE — 93306 TTE W/DOPPLER COMPLETE: CPT

## 2024-11-05 ENCOUNTER — HOSPITAL ENCOUNTER (OUTPATIENT)
Dept: ONCOLOGY | Facility: HOSPITAL | Age: 64
Discharge: HOME | End: 2024-11-05
Admitting: INTERNAL MEDICINE
Payer: COMMERCIAL

## 2024-11-05 VITALS
HEART RATE: 109 BPM | OXYGEN SATURATION: 94 % | TEMPERATURE: 97 F | RESPIRATION RATE: 16 BRPM | DIASTOLIC BLOOD PRESSURE: 65 MMHG | SYSTOLIC BLOOD PRESSURE: 100 MMHG | WEIGHT: 177.6 LBS | HEIGHT: 64 IN | BODY MASS INDEX: 30.32 KG/M2

## 2024-11-05 DIAGNOSIS — C50.911 BILATERAL MALIGNANT NEOPLASM OF BREAST IN FEMALE, ESTROGEN RECEPTOR POSITIVE, UNSPECIFIED SITE OF BREAST: Primary | ICD-10-CM

## 2024-11-05 DIAGNOSIS — Z17.0 BILATERAL MALIGNANT NEOPLASM OF BREAST IN FEMALE, ESTROGEN RECEPTOR POSITIVE, UNSPECIFIED SITE OF BREAST: Primary | ICD-10-CM

## 2024-11-05 DIAGNOSIS — R91.8 LUNG MASS: ICD-10-CM

## 2024-11-05 DIAGNOSIS — C50.912 BILATERAL MALIGNANT NEOPLASM OF BREAST IN FEMALE, ESTROGEN RECEPTOR POSITIVE, UNSPECIFIED SITE OF BREAST: Primary | ICD-10-CM

## 2024-11-05 LAB
ALBUMIN SERPL-MCNC: 3.9 G/DL (ref 3.5–5.2)
ALBUMIN/GLOB SERPL: 1.4 G/DL
ALP SERPL-CCNC: 91 U/L (ref 39–117)
ALT SERPL W P-5'-P-CCNC: <5 U/L (ref 1–33)
ANION GAP SERPL CALCULATED.3IONS-SCNC: 9.1 MMOL/L (ref 5–15)
AST SERPL-CCNC: 18 U/L (ref 1–32)
BASOPHILS # BLD AUTO: 0.03 10*3/MM3 (ref 0–0.2)
BASOPHILS NFR BLD AUTO: 0.7 % (ref 0–1.5)
BILIRUB SERPL-MCNC: 0.7 MG/DL (ref 0–1.2)
BUN SERPL-MCNC: 22 MG/DL (ref 8–23)
BUN/CREAT SERPL: 22 (ref 7–25)
CALCIUM SPEC-SCNC: 9.4 MG/DL (ref 8.6–10.5)
CHLORIDE SERPL-SCNC: 107 MMOL/L (ref 98–107)
CO2 SERPL-SCNC: 23.9 MMOL/L (ref 22–29)
CREAT SERPL-MCNC: 1 MG/DL (ref 0.57–1)
DEPRECATED RDW RBC AUTO: 51.8 FL (ref 37–54)
EGFRCR SERPLBLD CKD-EPI 2021: 63.4 ML/MIN/1.73
EOSINOPHIL # BLD AUTO: 0.33 10*3/MM3 (ref 0–0.4)
EOSINOPHIL NFR BLD AUTO: 7.2 % (ref 0.3–6.2)
ERYTHROCYTE [DISTWIDTH] IN BLOOD BY AUTOMATED COUNT: 16.4 % (ref 12.3–15.4)
GLOBULIN UR ELPH-MCNC: 2.7 GM/DL
GLUCOSE SERPL-MCNC: 88 MG/DL (ref 65–99)
HCT VFR BLD AUTO: 39.9 % (ref 34–46.6)
HGB BLD-MCNC: 12.4 G/DL (ref 12–15.9)
LYMPHOCYTES # BLD AUTO: 0.49 10*3/MM3 (ref 0.7–3.1)
LYMPHOCYTES NFR BLD AUTO: 10.7 % (ref 19.6–45.3)
MCH RBC QN AUTO: 27.4 PG (ref 26.6–33)
MCHC RBC AUTO-ENTMCNC: 31.1 G/DL (ref 31.5–35.7)
MCV RBC AUTO: 88.3 FL (ref 79–97)
MONOCYTES # BLD AUTO: 0.42 10*3/MM3 (ref 0.1–0.9)
MONOCYTES NFR BLD AUTO: 9.2 % (ref 5–12)
NEUTROPHILS NFR BLD AUTO: 3.3 10*3/MM3 (ref 1.7–7)
NEUTROPHILS NFR BLD AUTO: 72.2 % (ref 42.7–76)
PLATELET # BLD AUTO: 192 10*3/MM3 (ref 140–450)
PMV BLD AUTO: 9.1 FL (ref 6–12)
POTASSIUM SERPL-SCNC: 4.3 MMOL/L (ref 3.5–5.2)
PROT SERPL-MCNC: 6.6 G/DL (ref 6–8.5)
RBC # BLD AUTO: 4.52 10*6/MM3 (ref 3.77–5.28)
SODIUM SERPL-SCNC: 140 MMOL/L (ref 136–145)
WBC NRBC COR # BLD AUTO: 4.57 10*3/MM3 (ref 3.4–10.8)

## 2024-11-05 PROCEDURE — 80053 COMPREHEN METABOLIC PANEL: CPT | Performed by: INTERNAL MEDICINE

## 2024-11-05 PROCEDURE — 85025 COMPLETE CBC W/AUTO DIFF WBC: CPT | Performed by: INTERNAL MEDICINE

## 2024-11-05 PROCEDURE — 25010000002 HEPARIN LOCK FLUSH PER 10 UNITS: Performed by: INTERNAL MEDICINE

## 2024-11-05 PROCEDURE — 96413 CHEMO IV INFUSION 1 HR: CPT

## 2024-11-05 PROCEDURE — 25010000002 PERTUZUMAB 420 MG/14ML SOLUTION 420 MG VIAL: Performed by: INTERNAL MEDICINE

## 2024-11-05 PROCEDURE — 25010000002 TRASTUZUMAB-DKST 420 MG RECONSTITUTED SOLUTION 1 EACH VIAL: Performed by: INTERNAL MEDICINE

## 2024-11-05 PROCEDURE — 96417 CHEMO IV INFUS EACH ADDL SEQ: CPT

## 2024-11-05 PROCEDURE — 25810000003 SODIUM CHLORIDE 0.9 % SOLUTION 250 ML FLEX CONT: Performed by: INTERNAL MEDICINE

## 2024-11-05 RX ORDER — SODIUM CHLORIDE 0.9 % (FLUSH) 0.9 %
10 SYRINGE (ML) INJECTION AS NEEDED
Status: DISCONTINUED | OUTPATIENT
Start: 2024-11-05 | End: 2024-11-06 | Stop reason: HOSPADM

## 2024-11-05 RX ORDER — SODIUM CHLORIDE 0.9 % (FLUSH) 0.9 %
10 SYRINGE (ML) INJECTION AS NEEDED
OUTPATIENT
Start: 2024-11-05

## 2024-11-05 RX ORDER — HEPARIN SODIUM (PORCINE) LOCK FLUSH IV SOLN 100 UNIT/ML 100 UNIT/ML
500 SOLUTION INTRAVENOUS AS NEEDED
OUTPATIENT
Start: 2024-11-05

## 2024-11-05 RX ORDER — SODIUM CHLORIDE 9 MG/ML
20 INJECTION, SOLUTION INTRAVENOUS ONCE
Status: DISCONTINUED | OUTPATIENT
Start: 2024-11-05 | End: 2024-11-06 | Stop reason: HOSPADM

## 2024-11-05 RX ORDER — HEPARIN SODIUM (PORCINE) LOCK FLUSH IV SOLN 100 UNIT/ML 100 UNIT/ML
500 SOLUTION INTRAVENOUS AS NEEDED
Status: DISCONTINUED | OUTPATIENT
Start: 2024-11-05 | End: 2024-11-06 | Stop reason: HOSPADM

## 2024-11-05 RX ADMIN — PERTUZUMAB 420 MG: 30 INJECTION, SOLUTION, CONCENTRATE INTRAVENOUS at 09:59

## 2024-11-05 RX ADMIN — HEPARIN 500 UNITS: 100 SYRINGE at 11:34

## 2024-11-05 RX ADMIN — Medication 10 ML: at 11:34

## 2024-11-05 RX ADMIN — TRASTUZUMAB-DKST 490 MG: 420 INJECTION, POWDER, LYOPHILIZED, FOR SOLUTION INTRAVENOUS at 11:01

## 2024-11-21 ENCOUNTER — OFFICE VISIT (OUTPATIENT)
Dept: ONCOLOGY | Facility: CLINIC | Age: 64
End: 2024-11-21
Payer: COMMERCIAL

## 2024-11-21 ENCOUNTER — LAB (OUTPATIENT)
Dept: LAB | Facility: HOSPITAL | Age: 64
End: 2024-11-21
Payer: COMMERCIAL

## 2024-11-21 VITALS
WEIGHT: 177 LBS | BODY MASS INDEX: 30.22 KG/M2 | HEIGHT: 64 IN | SYSTOLIC BLOOD PRESSURE: 104 MMHG | OXYGEN SATURATION: 94 % | TEMPERATURE: 97.5 F | DIASTOLIC BLOOD PRESSURE: 68 MMHG | RESPIRATION RATE: 18 BRPM | HEART RATE: 110 BPM

## 2024-11-21 DIAGNOSIS — Z17.0 BILATERAL MALIGNANT NEOPLASM OF BREAST IN FEMALE, ESTROGEN RECEPTOR POSITIVE, UNSPECIFIED SITE OF BREAST: Primary | ICD-10-CM

## 2024-11-21 DIAGNOSIS — C50.911 BILATERAL MALIGNANT NEOPLASM OF BREAST IN FEMALE, ESTROGEN RECEPTOR POSITIVE, UNSPECIFIED SITE OF BREAST: ICD-10-CM

## 2024-11-21 DIAGNOSIS — E55.9 VITAMIN D DEFICIENCY: ICD-10-CM

## 2024-11-21 DIAGNOSIS — C50.912 BILATERAL MALIGNANT NEOPLASM OF BREAST IN FEMALE, ESTROGEN RECEPTOR POSITIVE, UNSPECIFIED SITE OF BREAST: ICD-10-CM

## 2024-11-21 DIAGNOSIS — Z17.0 BILATERAL MALIGNANT NEOPLASM OF BREAST IN FEMALE, ESTROGEN RECEPTOR POSITIVE, UNSPECIFIED SITE OF BREAST: ICD-10-CM

## 2024-11-21 DIAGNOSIS — Z79.899 ENCOUNTER FOR MONITORING CARDIOTOXIC DRUG THERAPY: ICD-10-CM

## 2024-11-21 DIAGNOSIS — Z51.81 ENCOUNTER FOR MONITORING CARDIOTOXIC DRUG THERAPY: ICD-10-CM

## 2024-11-21 DIAGNOSIS — R91.8 LUNG MASS: Primary | ICD-10-CM

## 2024-11-21 DIAGNOSIS — C50.911 BILATERAL MALIGNANT NEOPLASM OF BREAST IN FEMALE, ESTROGEN RECEPTOR POSITIVE, UNSPECIFIED SITE OF BREAST: Primary | ICD-10-CM

## 2024-11-21 DIAGNOSIS — C50.912 BILATERAL MALIGNANT NEOPLASM OF BREAST IN FEMALE, ESTROGEN RECEPTOR POSITIVE, UNSPECIFIED SITE OF BREAST: Primary | ICD-10-CM

## 2024-11-21 LAB
25(OH)D3 SERPL-MCNC: 36.9 NG/ML (ref 30–100)
BASOPHILS # BLD AUTO: 0.03 10*3/MM3 (ref 0–0.2)
BASOPHILS NFR BLD AUTO: 0.4 % (ref 0–1.5)
DEPRECATED RDW RBC AUTO: 48.1 FL (ref 37–54)
EOSINOPHIL # BLD AUTO: 0.31 10*3/MM3 (ref 0–0.4)
EOSINOPHIL NFR BLD AUTO: 4.4 % (ref 0.3–6.2)
ERYTHROCYTE [DISTWIDTH] IN BLOOD BY AUTOMATED COUNT: 15.3 % (ref 12.3–15.4)
HCT VFR BLD AUTO: 41.8 % (ref 34–46.6)
HGB BLD-MCNC: 13 G/DL (ref 12–15.9)
HOLD SPECIMEN: NORMAL
HOLD SPECIMEN: NORMAL
LYMPHOCYTES # BLD AUTO: 0.68 10*3/MM3 (ref 0.7–3.1)
LYMPHOCYTES NFR BLD AUTO: 9.6 % (ref 19.6–45.3)
MCH RBC QN AUTO: 27.2 PG (ref 26.6–33)
MCHC RBC AUTO-ENTMCNC: 31.1 G/DL (ref 31.5–35.7)
MCV RBC AUTO: 87.4 FL (ref 79–97)
MONOCYTES # BLD AUTO: 0.56 10*3/MM3 (ref 0.1–0.9)
MONOCYTES NFR BLD AUTO: 7.9 % (ref 5–12)
NEUTROPHILS NFR BLD AUTO: 5.48 10*3/MM3 (ref 1.7–7)
NEUTROPHILS NFR BLD AUTO: 77.7 % (ref 42.7–76)
PLATELET # BLD AUTO: 202 10*3/MM3 (ref 140–450)
PMV BLD AUTO: 8.8 FL (ref 6–12)
RBC # BLD AUTO: 4.78 10*6/MM3 (ref 3.77–5.28)
WBC NRBC COR # BLD AUTO: 7.06 10*3/MM3 (ref 3.4–10.8)

## 2024-11-21 PROCEDURE — 82306 VITAMIN D 25 HYDROXY: CPT | Performed by: INTERNAL MEDICINE

## 2024-11-21 PROCEDURE — 85025 COMPLETE CBC W/AUTO DIFF WBC: CPT

## 2024-11-21 PROCEDURE — 36415 COLL VENOUS BLD VENIPUNCTURE: CPT

## 2024-11-21 NOTE — PROGRESS NOTES
HEMATOLOGY ONCOLOGY OUTPATIENT FOLLOW UP       Patient name: Tracy Reddy  : 1960  MRN: [id number]  Primary Care Physician: Provider, No Known  Referring Physician: Provider, No Known  Reason For Consult: breast cancer    History of Present Illness:    Tracy Reddy is a 64 y.o. female who presented to [facility] on 4/3/2024 with complaints of cough.  She initially presented to [facility] ED with complaints of fever up to 103 °F, productive cough with green and brown sputum, shortness of air and chest tightness that has been ongoing for several days.  She reports she has family members that have been sick at home.  She reports she has also had a history of PE in the past.  Labs show troponin was 13 x 2, .8.  CBC unremarkable.  Respiratory panel was negative. Chest x-ray showed masslike opacity within the right lower lobe.  Sputum cultures and blood cultures have been ordered.  She is a non-smoker.  Pulmonology also has been consulted.     24 CT chest angiogram  Impression:  1. No evidence of pulmonary embolism.  2. No acute cardiopulmonary process. There is a mass present within the right lower lobe likely representing malignancy. There is enlarged right hilar adenopathy likely representing local metastatic disease. No previous imaging available for comparison.        24  Hematology/Oncology was consulted.       From record review: Patient has a history of bilateral ductal carcinoma diagnosed in .  Right breast IDC, G1, ER positive, CO positive, HER-2 negative. Left breast IDC, G2, ER positive, CO positive, HER-2 negative. Left SLND revealed 1 of 2 lymph nodes positive for macro metastasis. She is status post bilateral mastectomy with reconstruction in .  She is also completed neoadjuvant chemotherapy with 6 cycles of TCHP.  Taxotere and carboplatin with dose reductions at cycle 2 secondary to chemotherapy-induced nausea and vomiting.  Completed adjuvant  Herceptin Perjeta for 1 year.  Status post radiation therapy left chest wall and regional nodes, 5 fractions for a total dose of 5000 cGray. Current treatment with hormone blockade with Arimidex.  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  She follows at [facility], reviewed most recent office note from 3/11/2024.     4/5/2024 status post bronchoscopy.  EBUS guided FNA of the right lower lobe hilar mass confirmed metastatic ductal carcinoma of the breast ER +100% CT +10%, HER2 nu pending  Respiratory panel positive for human metapneumovirus.    4/12/2024 PET/CT with 1.1 cm lesion in the region of the left vallecula concerning for second primary malignancy.  Recommend correlation with direct visualization, large right hilar mass consistent with malignancy, no evidence of metastatic disease elsewhere    Path with HER 2 positive 3+ by IHC, guardant NGS with PIK3CA     5/13/24 - started THP  6/3/24 - C2  6/24/24 - C3  7/15/2024: Patient received cycle 4 chemotherapy with Taxotere Herceptin Perjeta  8/5/2024: Patient received cycle 5 Taxotere Herceptin Perjeta      7/12/24 - CT imaging - There is decrease in size of the right hilar mass compared to the prior examination. This now measures approximately 3.0 x 1.5 cm, previously measured 4.2 x 3.4 cm using similar measurement technique.     -9/6/2024 patient had a PET CT scan there is a dramatic interval decrease in the size of the right lower lobe infrahilar mass when compared to previous PET scan.  There is no longer any visible mass or abnormal metabolic activity in this location.  There may be residual scarring in that location.  There is an uptake within the right paratracheal lymph node measuring 1.4 cm SUV 5.6 suggesting metastatic disease previously measured 0.8 cm.  No additional areas of abnormal metabolic activity was seen.  No other evidence of metastatic disease was seen    Palliative XRT today right lung mass ongoing under the care of  Dr. Wilburn     Subjective:    Continues to tolerate treatment well, has had some rash.      She denies any new issues.  She is tolerating letrozole well    She has completed palliative XRT to lung.  She denies any new issues      Past Medical History:   Diagnosis Date    Cancer        Past Surgical History:   Procedure Laterality Date    BRONCHOSCOPY N/A 4/5/2024    Procedure: BRONCHOSCOPY WITH ENDOBRONCHIAL ULTRASOUND, bilateral bronchial washings, fine needle aspiration x 2 areas;  Surgeon: Edy Irving MD;  Location: [facility] ENDOSCOPY;  Service: Pulmonary;  Laterality: N/A;  post: lung mass    MASTECTOMY           Current Outpatient Medications:     Calcium Carb-Cholecalciferol (Calcium + Vitamin D3) 500-5 MG-MCG tablet per tablet, Take 1 tablet by mouth 2 (Two) Times a Day., Disp: 60 tablet, Rfl: 6    Calcium Citrate-Vitamin D (Citrus Calcium/Vitamin D) 200-6.25 MG-MCG tablet, Take  by mouth., Disp: , Rfl:     cetirizine (zyrTEC) 10 MG tablet, Take 1 tablet by mouth Daily., Disp: , Rfl:     letrozole (FEMARA) 2.5 MG tablet, Take 1 tablet by mouth Daily., Disp: 30 tablet, Rfl: 6    Lidocaine Viscous HCl (XYLOCAINE) 2 % solution, Take 10 mL by mouth As Needed for Mild Pain (Prior to meals 3-4 times per day)., Disp: 500 mL, Rfl: 3    omeprazole (priLOSEC) 20 MG capsule, Take 1 capsule by mouth Daily., Disp: 30 capsule, Rfl: 5    sucralfate (Carafate) 1 GM/10ML suspension, Take 10 mL by mouth 4 (Four) Times a Day With Meals & at Bedtime., Disp: 500 mL, Rfl: 3    Allergies   Allergen Reactions    Penicillins Rash       No family history on file.    Cancer-related family history is not on file.    Social History     Tobacco Use    Smoking status: Never     Passive exposure: Never    Smokeless tobacco: Never   Vaping Use    Vaping status: Never Used   Substance Use Topics    Alcohol use: Never    Drug use: Never     Social History     Social History Narrative    Not on file      ROS:   Review of Systems  "  Constitutional:  Negative for chills, fatigue and fever.   HENT:  Negative for congestion and nosebleeds.    Eyes:  Negative for pain and itching.   Respiratory:  Negative for cough and shortness of breath.    Cardiovascular:  Negative for chest pain.   Gastrointestinal:  Negative for abdominal pain, blood in stool, diarrhea, nausea and vomiting.   Endocrine: Negative for cold intolerance and heat intolerance.   Genitourinary:  Negative for difficulty urinating.   Musculoskeletal:  Negative for arthralgias.   Skin:  Negative for rash.   Neurological:  Negative for dizziness and headaches.   Hematological:  Does not bruise/bleed easily.   Psychiatric/Behavioral:  Negative for behavioral problems.        Objective:  Vital signs:  Vitals:    11/21/24 1313   BP: 104/68   Pulse: 110   Resp: 18   Temp: 97.5 °F (36.4 °C)   TempSrc: Infrared   SpO2: 94%   Weight: 80.3 kg (177 lb)   Height: 162.6 cm (64\")   PainSc: 0-No pain                 Body mass index is 30.38 kg/m².  ECOG  (0) Fully active, able to carry on all predisease performance without restriction    Physical Exam:   Physical Exam  Constitutional:       Appearance: Normal appearance.   HENT:      Head: Normocephalic and atraumatic.   Eyes:      Pupils: Pupils are equal, round, and reactive to light.   Cardiovascular:      Rate and Rhythm: Normal rate and regular rhythm.      Pulses: Normal pulses.      Heart sounds: Normal heart sounds. No murmur heard.  Pulmonary:      Effort: Pulmonary effort is normal.      Breath sounds: Normal breath sounds.   Abdominal:      General: There is no distension.      Palpations: Abdomen is soft. There is no mass.      Tenderness: There is no abdominal tenderness.   Musculoskeletal:         General: Normal range of motion.      Cervical back: Normal range of motion and neck supple.   Skin:     General: Skin is warm.   Neurological:      General: No focal deficit present.      Mental Status: She is alert.   Psychiatric:         " "Mood and Affect: Mood normal.         I have reexamined the patient and the results are consistent with the previously documented exam. Cadence Nazario MD        Lab Results - Last 18 Months   Lab Units 11/21/24  1251 11/05/24  0915 10/17/24  1356   WBC 10*3/mm3 7.06 4.57 4.74   HEMOGLOBIN g/dL 13.0 12.4 12.0   HEMATOCRIT % 41.8 39.9 39.0   PLATELETS 10*3/mm3 202 192 194   MCV fL 87.4 88.3 88.4     Lab Results - Last 18 Months   Lab Units 11/05/24  0915 10/15/24  0917 09/24/24  0831 05/13/24  0909 05/13/24  0835   SODIUM mmol/L 140  --  141  --  141   POTASSIUM mmol/L 4.3  --  3.9  --  4.1   CHLORIDE mmol/L 107  --  106  --  105   CO2 mmol/L 23.9  --  23.6  --  22.0   BUN mg/dL 22  --  17  --  21   CREATININE mg/dL 1.00 0.90 0.90   < > 0.95   CALCIUM mg/dL 9.4  --  9.2  --  9.7   BILIRUBIN mg/dL 0.7  --  0.5  --  0.5   ALK PHOS U/L 91  --  82  --  92   ALT (SGPT) U/L <5  --  <5  --  10   AST (SGOT) U/L 18  --  18  --  17   GLUCOSE mg/dL 88  --  90  --  192*    < > = values in this interval not displayed.       Lab Results   Component Value Date    GLUCOSE 88 11/05/2024    BUN 22 11/05/2024    CREATININE 1.00 11/05/2024    EGFRIFNONA 57 (L) 11/10/2021    EGFRIFAFRI 66 11/10/2021    BCR 22.0 11/05/2024    K 4.3 11/05/2024    CO2 23.9 11/05/2024    CALCIUM 9.4 11/05/2024    ALBUMIN 3.9 11/05/2024    AST 18 11/05/2024    ALT <5 11/05/2024       Lab Results - Last 18 Months   Lab Units 04/05/24  0415   INR  0.99       No results found for: \"IRON\", \"TIBC\", \"FERRITIN\"    No results found for: \"FOLATE\"    No results found for: \"OCCULTBLD\"    No results found for: \"RETICCTPCT\"  No results found for: \"CWZQPLDU55\"  No results found for: \"SPEP\", \"UPEP\"  No results found for: \"LDH\", \"URICACID\"  No results found for: \"BABAK\", \"RF\", \"SEDRATE\"  No results found for: \"FIBRINOGEN\", \"HAPTOGLOBIN\"  Lab Results   Component Value Date    INR 0.99 04/05/2024     No results found for: \"\"  No results found for: \"CEA\"  No " "components found for: \"CA-19-9\"  No results found for: \"PSA\"  No results found for: \"SEDRATE\"    Assessment & Plan     Tracy Reddy is a 63 y.o. female with history of pulmonary embolism and bilateral invasive ductal carcinoma status post bilateral mastectomy, chemotherapy and radiation treatments, on Arimidex.  She presented with dyspnea and was found to have a lung mass in the right lower lobe with right hilar and suprahilar lymphadenopathy.     Right lung mass-metastatic ductal carcinoma of the breast -recurrent, ER/SC positive HER2/nancy 3+ on immunohistochemistry    CT imaging showed mass present within the medial right lower lobe measuring up to 3.2 x 4.1 cm that extends to the right hilar region.  Additional conglomerate of nodes present within right hilar to right suprahilar region measuring up to 2.7 x 1.8 cm.  Status post bronchoscopy EBUS 4/5/2024.    Biopsy confirms metastatic ductal carcinoma of the breast ER/SC positive, HER2 nu pending.  Further treatment will depend on HER2/nancy status.  Will also send tumor sample out for [employer] NGS testing.  She is on Arimidex right now but will need to switch to an alternate AI or a different agent given mutational analysis findings.  If HER2 positive disease, will use HER2 blockade.  Given there is no other clear areas of metastasis.  We can still consider curative treatment with either radio-ablation or surgical resection.    Discussed at tumor board. Not a surgical candidate based on tumor location. Radiation possible. Will plan on starting chemo with herceptin perjeta and then consider radiation after 6 cycles.    NGS testing with guardant showing PIK3CA.  See report    Continue herceptin perjeta maintenance    Started treatment tolerating well has some side effects as above not significant to need any dose modification  CT imaging now with good response. Reviewed images independently and showed the patient. Reviewed labs, and notes from other " providers.ordered labs    Our plan would be to continue 3 more cycles of Taxotere, Herceptin and Perjeta and then discuss at tumor board again about consolidation with surgery vs radiation. Initial thought was that radiation would be preferable given location.  Continue treatment no changes.    She has completed planned 6 cycles of Taxotere, Herceptin and Perjeta    Resume AI after chemotherapy can start letrozole at that point.    She is currently on letrozole 2.5 mg p.o. daily as well as continue Herceptin Perjeta IV  Has been with vitamin D twice a day    Refer to Dr. Armenta for 1.4 cm right paratracheal node: Surgery not recommended at this time.  Patient receiving XRT to right lung.  After completed we will plan to transition to Kadcyla for maintenance.      Reviewed Kadcyla with patient information was also given to her .  Plan to start Kadcyla third week in January 2025    Reviewed in detail side effects to expect    Insomnia  - complains more with steroids  Takes melatonin  Continue the same      History of bilateral invasive ductal carcinoma  -Diagnosed in 2020. She is status post bilateral mastectomy with reconstruction in 2021.  She is also completed neoadjuvant chemotherapy with 6 cycles of TCHP.  Taxotere and carboplatin with dose reductions at cycle 2 secondary to chemotherapy-induced nausea and vomiting.  There is some confusion about her pathology as some reports show HER2 negative, some show positive.  However she did receive adjuvant Herceptin Perjeta maintenance which likely points towards HER2 positive disease.  She is also known to have CHEK2 mutation.  Completed adjuvant Herceptin Perjeta for 1 year.  Status post radiation therapy left chest wall and regional nodes, 5 fractions for a total dose of 5000 cGray. Current treatment with hormone blockade with Arimidex. She followed at [facility] has transferred care to us now         Pneumonia  This has  resolved    Osteopenia  DEXA with osteopenia. continue calcium vitamin D.  Continue    Rash  Likely herceptin related  Lasted for a week   Recommend hydrocortisone topical   Rash has improved    Neutropenia due to chemotherapy    Neutropenic precautions  Add Neulasta with cycle #6  Resolved    Diarrhea   Mild      Needs 2 D echo q 3 months for her 2 directed therapy    Next 2D echo is due 11/15/24.  This has been scheduled    Continue treatment  On chemotherapy requiring intensive monitoring.  Follow-up in 3 weeks    Reviewed PET scan with patient in detail          Follow-up 4 to 5 weeks from now or earlier as needed    At the next visit we will decide on when to transition to Kadcyla.  Reviewed      Reviewed 2D echo      I spent 40 total minutes, face-to-face, caring for Tracy baires. 90% of this time involved counseling and/or coordination of care as documented within this note.

## 2024-11-24 NOTE — PROGRESS NOTES
Radiation Treatment Summary Note      Patient Name: Tracy Reddy  : 1960    Attending Provider: No care team member to display      Diagnosis:     ICD-10-CM ICD-9-CM   1. Carcinoma of right breast metastatic to lung  C50.911 174.9    C78.00 197.0   2. Breast cancer metastasized to intrathoracic lymph node, right  C50.911 174.9    C77.1 196.1       Radiation Start Date: 10/2/2024    Radiation Completion Date: 10/29/2024      Prescription:     Site: Right lung, hilum/mediastinum  Laterality: Right  Total Dose: 5000 cGy  Dose per Fraction: 250 cGy  Total Fractions: 20  Daily or BID: Daily  Modality: Photon  Technique: IMRT/VMAT/Rapid Arc  Bolus: No    Final Delivered Dose Deviated From Initially Prescribed Dose: No    Concurrent Chemotherapy: No    Patient Tolerated Treatment Without Unexpected Side Effects/Complications: Yes    ECOG: Fully active, able to carry on all pre-disease performance without restriction = 0    Pain Management Plan:  Viscous lidocaine and carafate and OTC medications    Follow-Up Plan: 3 months    Imaging Ordered for Follow-Up: Yes, describe: CT Chest, abdomen, and pelivs in 3 months        Srinivas Wilburn MD

## 2024-11-26 ENCOUNTER — HOSPITAL ENCOUNTER (OUTPATIENT)
Dept: ONCOLOGY | Facility: HOSPITAL | Age: 64
Discharge: HOME | End: 2024-11-26
Admitting: INTERNAL MEDICINE
Payer: COMMERCIAL

## 2024-11-26 VITALS
OXYGEN SATURATION: 98 % | TEMPERATURE: 97.3 F | HEIGHT: 64 IN | DIASTOLIC BLOOD PRESSURE: 73 MMHG | HEART RATE: 96 BPM | RESPIRATION RATE: 14 BRPM | WEIGHT: 179.1 LBS | BODY MASS INDEX: 30.58 KG/M2 | SYSTOLIC BLOOD PRESSURE: 114 MMHG

## 2024-11-26 DIAGNOSIS — R91.8 LUNG MASS: ICD-10-CM

## 2024-11-26 DIAGNOSIS — C50.912 BILATERAL MALIGNANT NEOPLASM OF BREAST IN FEMALE, ESTROGEN RECEPTOR POSITIVE, UNSPECIFIED SITE OF BREAST: Primary | ICD-10-CM

## 2024-11-26 DIAGNOSIS — Z17.0 BILATERAL MALIGNANT NEOPLASM OF BREAST IN FEMALE, ESTROGEN RECEPTOR POSITIVE, UNSPECIFIED SITE OF BREAST: Primary | ICD-10-CM

## 2024-11-26 DIAGNOSIS — C50.911 BILATERAL MALIGNANT NEOPLASM OF BREAST IN FEMALE, ESTROGEN RECEPTOR POSITIVE, UNSPECIFIED SITE OF BREAST: Primary | ICD-10-CM

## 2024-11-26 LAB
ALBUMIN SERPL-MCNC: 3.8 G/DL (ref 3.5–5.2)
ALBUMIN/GLOB SERPL: 1.5 G/DL
ALP SERPL-CCNC: 88 U/L (ref 39–117)
ALT SERPL W P-5'-P-CCNC: <5 U/L (ref 1–33)
ANION GAP SERPL CALCULATED.3IONS-SCNC: 11.2 MMOL/L (ref 5–15)
AST SERPL-CCNC: 19 U/L (ref 1–32)
BASOPHILS # BLD AUTO: 0.03 10*3/MM3 (ref 0–0.2)
BASOPHILS NFR BLD AUTO: 0.5 % (ref 0–1.5)
BILIRUB SERPL-MCNC: 0.5 MG/DL (ref 0–1.2)
BUN SERPL-MCNC: 20 MG/DL (ref 8–23)
BUN/CREAT SERPL: 22.2 (ref 7–25)
CALCIUM SPEC-SCNC: 9.3 MG/DL (ref 8.6–10.5)
CHLORIDE SERPL-SCNC: 106 MMOL/L (ref 98–107)
CO2 SERPL-SCNC: 22.8 MMOL/L (ref 22–29)
CREAT SERPL-MCNC: 0.9 MG/DL (ref 0.57–1)
DEPRECATED RDW RBC AUTO: 48.1 FL (ref 37–54)
EGFRCR SERPLBLD CKD-EPI 2021: 72 ML/MIN/1.73
EOSINOPHIL # BLD AUTO: 0.28 10*3/MM3 (ref 0–0.4)
EOSINOPHIL NFR BLD AUTO: 5.1 % (ref 0.3–6.2)
ERYTHROCYTE [DISTWIDTH] IN BLOOD BY AUTOMATED COUNT: 15.3 % (ref 12.3–15.4)
GLOBULIN UR ELPH-MCNC: 2.6 GM/DL
GLUCOSE SERPL-MCNC: 108 MG/DL (ref 65–99)
HCT VFR BLD AUTO: 38.7 % (ref 34–46.6)
HGB BLD-MCNC: 12 G/DL (ref 12–15.9)
LYMPHOCYTES # BLD AUTO: 0.7 10*3/MM3 (ref 0.7–3.1)
LYMPHOCYTES NFR BLD AUTO: 12.8 % (ref 19.6–45.3)
MCH RBC QN AUTO: 27.4 PG (ref 26.6–33)
MCHC RBC AUTO-ENTMCNC: 31 G/DL (ref 31.5–35.7)
MCV RBC AUTO: 88.4 FL (ref 79–97)
MONOCYTES # BLD AUTO: 0.44 10*3/MM3 (ref 0.1–0.9)
MONOCYTES NFR BLD AUTO: 8 % (ref 5–12)
NEUTROPHILS NFR BLD AUTO: 4.04 10*3/MM3 (ref 1.7–7)
NEUTROPHILS NFR BLD AUTO: 73.6 % (ref 42.7–76)
PLATELET # BLD AUTO: 187 10*3/MM3 (ref 140–450)
PMV BLD AUTO: 8.6 FL (ref 6–12)
POTASSIUM SERPL-SCNC: 4 MMOL/L (ref 3.5–5.2)
PROT SERPL-MCNC: 6.4 G/DL (ref 6–8.5)
RBC # BLD AUTO: 4.38 10*6/MM3 (ref 3.77–5.28)
SODIUM SERPL-SCNC: 140 MMOL/L (ref 136–145)
WBC NRBC COR # BLD AUTO: 5.49 10*3/MM3 (ref 3.4–10.8)

## 2024-11-26 PROCEDURE — 25010000002 HEPARIN LOCK FLUSH PER 10 UNITS: Performed by: INTERNAL MEDICINE

## 2024-11-26 PROCEDURE — 80053 COMPREHEN METABOLIC PANEL: CPT | Performed by: INTERNAL MEDICINE

## 2024-11-26 PROCEDURE — 25810000003 SODIUM CHLORIDE 0.9 % SOLUTION 250 ML FLEX CONT: Performed by: NURSE PRACTITIONER

## 2024-11-26 PROCEDURE — 25010000002 PERTUZUMAB 420 MG/14ML SOLUTION 420 MG VIAL: Performed by: NURSE PRACTITIONER

## 2024-11-26 PROCEDURE — 85025 COMPLETE CBC W/AUTO DIFF WBC: CPT | Performed by: INTERNAL MEDICINE

## 2024-11-26 PROCEDURE — 96417 CHEMO IV INFUS EACH ADDL SEQ: CPT

## 2024-11-26 PROCEDURE — 96413 CHEMO IV INFUSION 1 HR: CPT

## 2024-11-26 PROCEDURE — 25010000002 TRASTUZUMAB-DKST 420 MG RECONSTITUTED SOLUTION 1 EACH VIAL: Performed by: NURSE PRACTITIONER

## 2024-11-26 RX ORDER — SODIUM CHLORIDE 9 MG/ML
20 INJECTION, SOLUTION INTRAVENOUS ONCE
Status: DISCONTINUED | OUTPATIENT
Start: 2024-11-26 | End: 2024-11-27 | Stop reason: HOSPADM

## 2024-11-26 RX ORDER — SODIUM CHLORIDE 9 MG/ML
20 INJECTION, SOLUTION INTRAVENOUS ONCE
Status: CANCELLED | OUTPATIENT
Start: 2024-11-26

## 2024-11-26 RX ORDER — SODIUM CHLORIDE 0.9 % (FLUSH) 0.9 %
10 SYRINGE (ML) INJECTION AS NEEDED
OUTPATIENT
Start: 2024-11-26

## 2024-11-26 RX ORDER — HEPARIN SODIUM (PORCINE) LOCK FLUSH IV SOLN 100 UNIT/ML 100 UNIT/ML
500 SOLUTION INTRAVENOUS AS NEEDED
Status: DISCONTINUED | OUTPATIENT
Start: 2024-11-26 | End: 2024-11-27 | Stop reason: HOSPADM

## 2024-11-26 RX ORDER — HEPARIN SODIUM (PORCINE) LOCK FLUSH IV SOLN 100 UNIT/ML 100 UNIT/ML
500 SOLUTION INTRAVENOUS AS NEEDED
OUTPATIENT
Start: 2024-11-26

## 2024-11-26 RX ORDER — SODIUM CHLORIDE 0.9 % (FLUSH) 0.9 %
10 SYRINGE (ML) INJECTION AS NEEDED
Status: DISCONTINUED | OUTPATIENT
Start: 2024-11-26 | End: 2024-11-27 | Stop reason: HOSPADM

## 2024-11-26 RX ADMIN — Medication 10 ML: at 12:36

## 2024-11-26 RX ADMIN — PERTUZUMAB 420 MG: 30 INJECTION, SOLUTION, CONCENTRATE INTRAVENOUS at 10:52

## 2024-11-26 RX ADMIN — HEPARIN 500 UNITS: 100 SYRINGE at 12:36

## 2024-11-26 RX ADMIN — TRASTUZUMAB-DKST 490 MG: KIT at 11:57

## 2024-12-17 ENCOUNTER — HOSPITAL ENCOUNTER (OUTPATIENT)
Dept: ONCOLOGY | Facility: HOSPITAL | Age: 64
Discharge: HOME | End: 2024-12-17
Admitting: INTERNAL MEDICINE
Payer: COMMERCIAL

## 2024-12-17 VITALS
OXYGEN SATURATION: 94 % | DIASTOLIC BLOOD PRESSURE: 75 MMHG | WEIGHT: 177.7 LBS | HEIGHT: 64 IN | SYSTOLIC BLOOD PRESSURE: 117 MMHG | TEMPERATURE: 97.5 F | HEART RATE: 100 BPM | BODY MASS INDEX: 30.34 KG/M2

## 2024-12-17 DIAGNOSIS — C50.911 BILATERAL MALIGNANT NEOPLASM OF BREAST IN FEMALE, ESTROGEN RECEPTOR POSITIVE, UNSPECIFIED SITE OF BREAST: Primary | ICD-10-CM

## 2024-12-17 DIAGNOSIS — Z17.0 BILATERAL MALIGNANT NEOPLASM OF BREAST IN FEMALE, ESTROGEN RECEPTOR POSITIVE, UNSPECIFIED SITE OF BREAST: Primary | ICD-10-CM

## 2024-12-17 DIAGNOSIS — R91.8 LUNG MASS: ICD-10-CM

## 2024-12-17 DIAGNOSIS — C50.912 BILATERAL MALIGNANT NEOPLASM OF BREAST IN FEMALE, ESTROGEN RECEPTOR POSITIVE, UNSPECIFIED SITE OF BREAST: Primary | ICD-10-CM

## 2024-12-17 LAB
ALP BLD-CCNC: 90 U/L (ref 42–141)
BASOPHILS # BLD AUTO: 0.03 10*3/MM3 (ref 0–0.2)
BASOPHILS NFR BLD AUTO: 0.6 % (ref 0–1.5)
BUN BLDA-MCNC: 21 MG/DL (ref 7–22)
CALCIUM BLD QL: 9.1 MG/DL (ref 8–10.3)
CHLORIDE BLDA-SCNC: 110 MMOL/L (ref 98–108)
CO2 BLDA-SCNC: 27 MMOL/L (ref 18–33)
CREAT BLDA-MCNC: 0.9 MG/DL (ref 0.6–1.2)
DEPRECATED RDW RBC AUTO: 45.5 FL (ref 37–54)
EGFRCR SERPLBLD CKD-EPI 2021: 72 ML/MIN/1.73
EOSINOPHIL # BLD AUTO: 0.24 10*3/MM3 (ref 0–0.4)
EOSINOPHIL NFR BLD AUTO: 4.7 % (ref 0.3–6.2)
ERYTHROCYTE [DISTWIDTH] IN BLOOD BY AUTOMATED COUNT: 14.9 % (ref 12.3–15.4)
GLUCOSE BLDC GLUCOMTR-MCNC: 99 MG/DL (ref 73–118)
HCT VFR BLD AUTO: 39.5 % (ref 34–46.6)
HGB BLD-MCNC: 12.4 G/DL (ref 12–15.9)
LYMPHOCYTES # BLD AUTO: 0.79 10*3/MM3 (ref 0.7–3.1)
LYMPHOCYTES NFR BLD AUTO: 15.3 % (ref 19.6–45.3)
MCH RBC QN AUTO: 27 PG (ref 26.6–33)
MCHC RBC AUTO-ENTMCNC: 31.4 G/DL (ref 31.5–35.7)
MCV RBC AUTO: 86.1 FL (ref 79–97)
MONOCYTES # BLD AUTO: 0.43 10*3/MM3 (ref 0.1–0.9)
MONOCYTES NFR BLD AUTO: 8.3 % (ref 5–12)
NEUTROPHILS NFR BLD AUTO: 3.67 10*3/MM3 (ref 1.7–7)
NEUTROPHILS NFR BLD AUTO: 71.1 % (ref 42.7–76)
PLATELET # BLD AUTO: 176 10*3/MM3 (ref 140–450)
PMV BLD AUTO: 8.6 FL (ref 6–12)
POC ALBUMIN: 3.5 G/L (ref 3.3–5.5)
POC ALT (SGPT): 12 U/L (ref 10–47)
POC AST (SGOT): 26 U/L (ref 11–38)
POC TOTAL BILIRUBIN: 0.9 MG/DL (ref 0.2–1.6)
POC TOTAL PROTEIN: 6.8 G/DL (ref 6.4–8.1)
POTASSIUM BLDA-SCNC: 4.3 MMOL/L (ref 3.6–5.1)
RBC # BLD AUTO: 4.59 10*6/MM3 (ref 3.77–5.28)
SODIUM BLD-SCNC: 145 MMOL/L (ref 128–145)
WBC NRBC COR # BLD AUTO: 5.16 10*3/MM3 (ref 3.4–10.8)

## 2024-12-17 PROCEDURE — 25810000003 SODIUM CHLORIDE 0.9 % SOLUTION 250 ML FLEX CONT: Performed by: PHYSICIAN ASSISTANT

## 2024-12-17 PROCEDURE — 96413 CHEMO IV INFUSION 1 HR: CPT

## 2024-12-17 PROCEDURE — 25010000002 HEPARIN LOCK FLUSH PER 10 UNITS: Performed by: INTERNAL MEDICINE

## 2024-12-17 PROCEDURE — 80053 COMPREHEN METABOLIC PANEL: CPT

## 2024-12-17 PROCEDURE — 85025 COMPLETE CBC W/AUTO DIFF WBC: CPT | Performed by: INTERNAL MEDICINE

## 2024-12-17 PROCEDURE — 96417 CHEMO IV INFUS EACH ADDL SEQ: CPT

## 2024-12-17 PROCEDURE — 25010000002 TRASTUZUMAB-DKST 420 MG RECONSTITUTED SOLUTION 1 EACH VIAL: Performed by: PHYSICIAN ASSISTANT

## 2024-12-17 PROCEDURE — 25010000002 PERTUZUMAB 420 MG/14ML SOLUTION 420 MG VIAL: Performed by: PHYSICIAN ASSISTANT

## 2024-12-17 RX ORDER — HEPARIN SODIUM (PORCINE) LOCK FLUSH IV SOLN 100 UNIT/ML 100 UNIT/ML
500 SOLUTION INTRAVENOUS AS NEEDED
Status: DISCONTINUED | OUTPATIENT
Start: 2024-12-17 | End: 2024-12-18 | Stop reason: HOSPADM

## 2024-12-17 RX ORDER — SODIUM CHLORIDE 9 MG/ML
20 INJECTION, SOLUTION INTRAVENOUS ONCE
Status: DISCONTINUED | OUTPATIENT
Start: 2024-12-17 | End: 2024-12-18 | Stop reason: HOSPADM

## 2024-12-17 RX ORDER — SODIUM CHLORIDE 9 MG/ML
20 INJECTION, SOLUTION INTRAVENOUS ONCE
Status: CANCELLED | OUTPATIENT
Start: 2024-12-17

## 2024-12-17 RX ORDER — SODIUM CHLORIDE 0.9 % (FLUSH) 0.9 %
10 SYRINGE (ML) INJECTION AS NEEDED
OUTPATIENT
Start: 2024-12-17

## 2024-12-17 RX ORDER — SODIUM CHLORIDE 0.9 % (FLUSH) 0.9 %
10 SYRINGE (ML) INJECTION AS NEEDED
Status: DISCONTINUED | OUTPATIENT
Start: 2024-12-17 | End: 2024-12-18 | Stop reason: HOSPADM

## 2024-12-17 RX ORDER — HEPARIN SODIUM (PORCINE) LOCK FLUSH IV SOLN 100 UNIT/ML 100 UNIT/ML
500 SOLUTION INTRAVENOUS AS NEEDED
OUTPATIENT
Start: 2024-12-17

## 2024-12-17 RX ADMIN — HEPARIN 500 UNITS: 100 SYRINGE at 14:56

## 2024-12-17 RX ADMIN — TRASTUZUMAB-DKST 490 MG: 420 INJECTION, POWDER, LYOPHILIZED, FOR SOLUTION INTRAVENOUS at 14:15

## 2024-12-17 RX ADMIN — PERTUZUMAB 420 MG: 30 INJECTION, SOLUTION, CONCENTRATE INTRAVENOUS at 13:03

## 2024-12-17 RX ADMIN — Medication 10 ML: at 14:56

## 2025-01-10 ENCOUNTER — TELEPHONE (OUTPATIENT)
Dept: ONCOLOGY | Facility: CLINIC | Age: 65
End: 2025-01-10
Payer: COMMERCIAL

## 2025-01-10 NOTE — TELEPHONE ENCOUNTER
"Caller: Tracy Reddy \"Radha\"    Relationship to patient: Self    Best call back number:   Telephone Information:   Mobile [phone]     Chief complaint:     Type of visit: LAB, F/U 1 & INFUSION     Requested date: CALL TO R/S     If rescheduling, when is the original appointment: 1/7/2025     Additional notes:     "

## 2025-01-14 ENCOUNTER — OFFICE VISIT (OUTPATIENT)
Dept: ONCOLOGY | Facility: CLINIC | Age: 65
End: 2025-01-14
Payer: COMMERCIAL

## 2025-01-14 ENCOUNTER — HOSPITAL ENCOUNTER (OUTPATIENT)
Dept: ONCOLOGY | Facility: HOSPITAL | Age: 65
Discharge: HOME | End: 2025-01-14
Admitting: INTERNAL MEDICINE
Payer: COMMERCIAL

## 2025-01-14 VITALS
RESPIRATION RATE: 16 BRPM | DIASTOLIC BLOOD PRESSURE: 69 MMHG | WEIGHT: 178 LBS | TEMPERATURE: 97.9 F | HEART RATE: 106 BPM | BODY MASS INDEX: 30.39 KG/M2 | OXYGEN SATURATION: 93 % | SYSTOLIC BLOOD PRESSURE: 108 MMHG | HEIGHT: 64 IN

## 2025-01-14 VITALS
BODY MASS INDEX: 30.42 KG/M2 | HEART RATE: 106 BPM | TEMPERATURE: 97.9 F | HEIGHT: 64 IN | RESPIRATION RATE: 16 BRPM | DIASTOLIC BLOOD PRESSURE: 69 MMHG | SYSTOLIC BLOOD PRESSURE: 108 MMHG | OXYGEN SATURATION: 93 % | WEIGHT: 178.2 LBS

## 2025-01-14 DIAGNOSIS — R91.8 LUNG MASS: ICD-10-CM

## 2025-01-14 DIAGNOSIS — C50.911 BILATERAL MALIGNANT NEOPLASM OF BREAST IN FEMALE, ESTROGEN RECEPTOR POSITIVE, UNSPECIFIED SITE OF BREAST: Primary | ICD-10-CM

## 2025-01-14 DIAGNOSIS — C50.912 BILATERAL MALIGNANT NEOPLASM OF BREAST IN FEMALE, ESTROGEN RECEPTOR POSITIVE, UNSPECIFIED SITE OF BREAST: Primary | ICD-10-CM

## 2025-01-14 DIAGNOSIS — Z17.0 BILATERAL MALIGNANT NEOPLASM OF BREAST IN FEMALE, ESTROGEN RECEPTOR POSITIVE, UNSPECIFIED SITE OF BREAST: Primary | ICD-10-CM

## 2025-01-14 LAB
ALBUMIN SERPL-MCNC: 3.8 G/DL (ref 3.5–5.2)
ALBUMIN/GLOB SERPL: 1.4 G/DL
ALP SERPL-CCNC: 88 U/L (ref 39–117)
ALT SERPL W P-5'-P-CCNC: 6 U/L (ref 1–33)
ANION GAP SERPL CALCULATED.3IONS-SCNC: 9.4 MMOL/L (ref 5–15)
AST SERPL-CCNC: 20 U/L (ref 1–32)
BASOPHILS # BLD AUTO: 0.04 10*3/MM3 (ref 0–0.2)
BASOPHILS NFR BLD AUTO: 0.6 % (ref 0–1.5)
BILIRUB SERPL-MCNC: 0.5 MG/DL (ref 0–1.2)
BUN SERPL-MCNC: 26 MG/DL (ref 8–23)
BUN/CREAT SERPL: 26 (ref 7–25)
CALCIUM SPEC-SCNC: 9.4 MG/DL (ref 8.6–10.5)
CHLORIDE SERPL-SCNC: 107 MMOL/L (ref 98–107)
CO2 SERPL-SCNC: 24.6 MMOL/L (ref 22–29)
CREAT SERPL-MCNC: 1 MG/DL (ref 0.57–1)
DEPRECATED RDW RBC AUTO: 47.4 FL (ref 37–54)
EGFRCR SERPLBLD CKD-EPI 2021: 63.4 ML/MIN/1.73
EOSINOPHIL # BLD AUTO: 0.39 10*3/MM3 (ref 0–0.4)
EOSINOPHIL NFR BLD AUTO: 5.8 % (ref 0.3–6.2)
ERYTHROCYTE [DISTWIDTH] IN BLOOD BY AUTOMATED COUNT: 15 % (ref 12.3–15.4)
GLOBULIN UR ELPH-MCNC: 2.7 GM/DL
GLUCOSE SERPL-MCNC: 103 MG/DL (ref 65–99)
HCT VFR BLD AUTO: 39.5 % (ref 34–46.6)
HGB BLD-MCNC: 12.1 G/DL (ref 12–15.9)
LYMPHOCYTES # BLD AUTO: 0.71 10*3/MM3 (ref 0.7–3.1)
LYMPHOCYTES NFR BLD AUTO: 10.6 % (ref 19.6–45.3)
MCH RBC QN AUTO: 26.7 PG (ref 26.6–33)
MCHC RBC AUTO-ENTMCNC: 30.6 G/DL (ref 31.5–35.7)
MCV RBC AUTO: 87.2 FL (ref 79–97)
MONOCYTES # BLD AUTO: 0.46 10*3/MM3 (ref 0.1–0.9)
MONOCYTES NFR BLD AUTO: 6.9 % (ref 5–12)
NEUTROPHILS NFR BLD AUTO: 5.08 10*3/MM3 (ref 1.7–7)
NEUTROPHILS NFR BLD AUTO: 76.1 % (ref 42.7–76)
PLATELET # BLD AUTO: 203 10*3/MM3 (ref 140–450)
PMV BLD AUTO: 8.7 FL (ref 6–12)
POTASSIUM SERPL-SCNC: 4.3 MMOL/L (ref 3.5–5.2)
PROT SERPL-MCNC: 6.5 G/DL (ref 6–8.5)
RBC # BLD AUTO: 4.53 10*6/MM3 (ref 3.77–5.28)
SODIUM SERPL-SCNC: 141 MMOL/L (ref 136–145)
WBC NRBC COR # BLD AUTO: 6.68 10*3/MM3 (ref 3.4–10.8)

## 2025-01-14 PROCEDURE — 25010000002 PERTUZUMAB 420 MG/14ML SOLUTION 420 MG VIAL: Performed by: NURSE PRACTITIONER

## 2025-01-14 PROCEDURE — 25810000003 SODIUM CHLORIDE 0.9 % SOLUTION 250 ML FLEX CONT: Performed by: NURSE PRACTITIONER

## 2025-01-14 PROCEDURE — 80053 COMPREHEN METABOLIC PANEL: CPT | Performed by: INTERNAL MEDICINE

## 2025-01-14 PROCEDURE — 96417 CHEMO IV INFUS EACH ADDL SEQ: CPT

## 2025-01-14 PROCEDURE — 25010000002 HEPARIN LOCK FLUSH PER 10 UNITS: Performed by: INTERNAL MEDICINE

## 2025-01-14 PROCEDURE — 25010000002 TRASTUZUMAB-DKST 420 MG RECONSTITUTED SOLUTION 1 EACH VIAL: Performed by: NURSE PRACTITIONER

## 2025-01-14 PROCEDURE — 96413 CHEMO IV INFUSION 1 HR: CPT

## 2025-01-14 PROCEDURE — 85025 COMPLETE CBC W/AUTO DIFF WBC: CPT | Performed by: INTERNAL MEDICINE

## 2025-01-14 RX ORDER — HEPARIN SODIUM (PORCINE) LOCK FLUSH IV SOLN 100 UNIT/ML 100 UNIT/ML
500 SOLUTION INTRAVENOUS AS NEEDED
Status: DISCONTINUED | OUTPATIENT
Start: 2025-01-14 | End: 2025-01-15 | Stop reason: HOSPADM

## 2025-01-14 RX ORDER — HEPARIN SODIUM (PORCINE) LOCK FLUSH IV SOLN 100 UNIT/ML 100 UNIT/ML
500 SOLUTION INTRAVENOUS AS NEEDED
OUTPATIENT
Start: 2025-01-14

## 2025-01-14 RX ORDER — SODIUM CHLORIDE 0.9 % (FLUSH) 0.9 %
10 SYRINGE (ML) INJECTION AS NEEDED
OUTPATIENT
Start: 2025-01-14

## 2025-01-14 RX ORDER — SODIUM CHLORIDE 0.9 % (FLUSH) 0.9 %
10 SYRINGE (ML) INJECTION AS NEEDED
Status: DISCONTINUED | OUTPATIENT
Start: 2025-01-14 | End: 2025-01-15 | Stop reason: HOSPADM

## 2025-01-14 RX ORDER — SODIUM CHLORIDE 9 MG/ML
20 INJECTION, SOLUTION INTRAVENOUS ONCE
Status: DISCONTINUED | OUTPATIENT
Start: 2025-01-14 | End: 2025-01-15 | Stop reason: HOSPADM

## 2025-01-14 RX ORDER — SODIUM CHLORIDE 9 MG/ML
20 INJECTION, SOLUTION INTRAVENOUS ONCE
Status: CANCELLED | OUTPATIENT
Start: 2025-01-14

## 2025-01-14 RX ADMIN — PERTUZUMAB 420 MG: 30 INJECTION, SOLUTION, CONCENTRATE INTRAVENOUS at 13:12

## 2025-01-14 RX ADMIN — Medication 10 ML: at 14:55

## 2025-01-14 RX ADMIN — HEPARIN 500 UNITS: 100 SYRINGE at 14:55

## 2025-01-14 RX ADMIN — TRASTUZUMAB-DKST 490 MG: KIT at 14:18

## 2025-01-14 NOTE — PROGRESS NOTES
HEMATOLOGY ONCOLOGY OUTPATIENT FOLLOW UP       Patient name: Tracy Reddy  : 1960  MRN: [id number]  Primary Care Physician: Provider, No Known  Referring Physician: Provider, No Known  Reason For Consult: breast cancer    History of Present Illness:    Tracy Reddy is a 64 y.o. female who presented to [facility] on 4/3/2024 with complaints of cough.  She initially presented to [facility] ED with complaints of fever up to 103 °F, productive cough with green and brown sputum, shortness of air and chest tightness that has been ongoing for several days.  She reports she has family members that have been sick at home.  She reports she has also had a history of PE in the past.  Labs show troponin was 13 x 2, .8.  CBC unremarkable.  Respiratory panel was negative. Chest x-ray showed masslike opacity within the right lower lobe.  Sputum cultures and blood cultures have been ordered.  She is a non-smoker.  Pulmonology also has been consulted.     24 CT chest angiogram  Impression:  1. No evidence of pulmonary embolism.  2. No acute cardiopulmonary process. There is a mass present within the right lower lobe likely representing malignancy. There is enlarged right hilar adenopathy likely representing local metastatic disease. No previous imaging available for comparison.        24  Hematology/Oncology was consulted.       From record review: Patient has a history of bilateral ductal carcinoma diagnosed in .  Right breast IDC, G1, ER positive, NV positive, HER-2 negative. Left breast IDC, G2, ER positive, NV positive, HER-2 negative. Left SLND revealed 1 of 2 lymph nodes positive for macro metastasis. She is status post bilateral mastectomy with reconstruction in .  She is also completed neoadjuvant chemotherapy with 6 cycles of TCHP.  Taxotere and carboplatin with dose reductions at cycle 2 secondary to chemotherapy-induced nausea and vomiting.  Completed adjuvant  Herceptin Perjeta for 1 year.  Status post radiation therapy left chest wall and regional nodes, 5 fractions for a total dose of 5000 cGray. Current treatment with hormone blockade with Arimidex.  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  She follows at [facility], reviewed most recent office note from 3/11/2024.     4/5/2024 status post bronchoscopy.  EBUS guided FNA of the right lower lobe hilar mass confirmed metastatic ductal carcinoma of the breast ER +100% TN +10%, HER2 nu pending  Respiratory panel positive for human metapneumovirus.    4/12/2024 PET/CT with 1.1 cm lesion in the region of the left vallecula concerning for second primary malignancy.  Recommend correlation with direct visualization, large right hilar mass consistent with malignancy, no evidence of metastatic disease elsewhere    Path with HER 2 positive 3+ by IHC, guardant NGS with PIK3CA     5/13/24 - started THP  6/3/24 - C2  6/24/24 - C3  7/15/2024: Patient received cycle 4 chemotherapy with Taxotere Herceptin Perjeta  8/5/2024: Patient received cycle 5 Taxotere Herceptin Perjeta      7/12/24 - CT imaging - There is decrease in size of the right hilar mass compared to the prior examination. This now measures approximately 3.0 x 1.5 cm, previously measured 4.2 x 3.4 cm using similar measurement technique.     -9/6/2024 patient had a PET CT scan there is a dramatic interval decrease in the size of the right lower lobe infrahilar mass when compared to previous PET scan.  There is no longer any visible mass or abnormal metabolic activity in this location.  There may be residual scarring in that location.  There is an uptake within the right paratracheal lymph node measuring 1.4 cm SUV 5.6 suggesting metastatic disease previously measured 0.8 cm.  No additional areas of abnormal metabolic activity was seen.  No other evidence of metastatic disease was seen    Palliative XRT today right lung mass ongoing under the care of  Dr. Wilburn     Subjective:    Continues to tolerate treatment well, has had some rash.      She denies any new issues.  She is tolerating letrozole well    She has completed palliative XRT to lung.  She denies any new issues    Patient is here today for routine follow-up denies any new issues.  She is scheduled for CT scans of the chest abdomen pelvis January 23, 2025      Past Medical History:   Diagnosis Date    Cancer        Past Surgical History:   Procedure Laterality Date    BRONCHOSCOPY N/A 4/5/2024    Procedure: BRONCHOSCOPY WITH ENDOBRONCHIAL ULTRASOUND, bilateral bronchial washings, fine needle aspiration x 2 areas;  Surgeon: Edy Irving MD;  Location: [facility] ENDOSCOPY;  Service: Pulmonary;  Laterality: N/A;  post: lung mass    MASTECTOMY           Current Outpatient Medications:     Calcium Carb-Cholecalciferol (Calcium + Vitamin D3) 500-5 MG-MCG tablet per tablet, Take 1 tablet by mouth 2 (Two) Times a Day., Disp: 60 tablet, Rfl: 6    Calcium Citrate-Vitamin D (Citrus Calcium/Vitamin D) 200-6.25 MG-MCG tablet, Take  by mouth., Disp: , Rfl:     cetirizine (zyrTEC) 10 MG tablet, Take 1 tablet by mouth Daily., Disp: , Rfl:     letrozole (FEMARA) 2.5 MG tablet, Take 1 tablet by mouth Daily., Disp: 30 tablet, Rfl: 6    Lidocaine Viscous HCl (XYLOCAINE) 2 % solution, Take 10 mL by mouth As Needed for Mild Pain (Prior to meals 3-4 times per day)., Disp: 500 mL, Rfl: 3    omeprazole (priLOSEC) 20 MG capsule, Take 1 capsule by mouth Daily., Disp: 30 capsule, Rfl: 5    sucralfate (Carafate) 1 GM/10ML suspension, Take 10 mL by mouth 4 (Four) Times a Day With Meals & at Bedtime., Disp: 500 mL, Rfl: 3  No current facility-administered medications for this visit.    Facility-Administered Medications Ordered in Other Visits:     heparin injection 500 Units, 500 Units, Intravenous, PRN, Cadence Nazario MD, 500 Units at 01/14/25 1455    sodium chloride 0.9 % flush 10 mL, 10 mL, Intravenous, PRN, Cadence Nazario  "MD Mireya, 10 mL at 01/14/25 1455    sodium chloride 0.9 % infusion, 20 mL/hr, Intravenous, Once, Coby Vasquez APRN    Allergies   Allergen Reactions    Penicillins Rash       No family history on file.    Cancer-related family history is not on file.    Social History     Tobacco Use    Smoking status: Never     Passive exposure: Never    Smokeless tobacco: Never   Vaping Use    Vaping status: Never Used   Substance Use Topics    Alcohol use: Never    Drug use: Never     Social History     Social History Narrative    Not on file      ROS:   Review of Systems   Constitutional:  Negative for chills, fatigue and fever.   HENT:  Negative for congestion and nosebleeds.    Eyes:  Negative for pain and itching.   Respiratory:  Negative for cough and shortness of breath.    Cardiovascular:  Negative for chest pain.   Gastrointestinal:  Negative for abdominal pain, blood in stool, diarrhea, nausea and vomiting.   Endocrine: Negative for cold intolerance and heat intolerance.   Genitourinary:  Negative for difficulty urinating.   Musculoskeletal:  Negative for arthralgias.   Skin:  Negative for rash.   Neurological:  Negative for dizziness and headaches.   Hematological:  Does not bruise/bleed easily.   Psychiatric/Behavioral:  Negative for behavioral problems.        Objective:  Vital signs:  Vitals:    01/14/25 1152   BP: 108/69   Pulse: 106   Resp: 16   Temp: 97.9 °F (36.6 °C)   TempSrc: Temporal   SpO2: 93%   Weight: 80.7 kg (178 lb)   Height: 162.6 cm (64\")   PainSc: 0-No pain                   Body mass index is 30.55 kg/m².  ECOG  (0) Fully active, able to carry on all predisease performance without restriction    Physical Exam:   Physical Exam  Constitutional:       Appearance: Normal appearance.   HENT:      Head: Normocephalic and atraumatic.   Eyes:      Pupils: Pupils are equal, round, and reactive to light.   Cardiovascular:      Rate and Rhythm: Normal rate and regular rhythm.      Pulses: Normal " "pulses.      Heart sounds: Normal heart sounds. No murmur heard.  Pulmonary:      Effort: Pulmonary effort is normal.      Breath sounds: Normal breath sounds.   Abdominal:      General: There is no distension.      Palpations: Abdomen is soft. There is no mass.      Tenderness: There is no abdominal tenderness.   Musculoskeletal:         General: Normal range of motion.      Cervical back: Normal range of motion and neck supple.   Skin:     General: Skin is warm.   Neurological:      General: No focal deficit present.      Mental Status: She is alert.   Psychiatric:         Mood and Affect: Mood normal.       I have reexamined the patient and the results are consistent with the previously documented exam. Cadence Nazario MD        Lab Results - Last 18 Months   Lab Units 01/14/25  1144 12/17/24  1156 11/26/24  0938   WBC 10*3/mm3 6.68 5.16 5.49   HEMOGLOBIN g/dL 12.1 12.4 12.0   HEMATOCRIT % 39.5 39.5 38.7   PLATELETS 10*3/mm3 203 176 187   MCV fL 87.2 86.1 88.4     Lab Results - Last 18 Months   Lab Units 01/14/25  1144 12/17/24  1216 11/26/24  0938 11/05/24  0915   SODIUM mmol/L 141  --  140 140   POTASSIUM mmol/L 4.3  --  4.0 4.3   CHLORIDE mmol/L 107  --  106 107   CO2 mmol/L 24.6  --  22.8 23.9   BUN mg/dL 26*  --  20 22   CREATININE mg/dL 1.00 0.90 0.90 1.00   CALCIUM mg/dL 9.4  --  9.3 9.4   BILIRUBIN mg/dL 0.5  --  0.5 0.7   ALK PHOS U/L 88  --  88 91   ALT (SGPT) U/L 6  --  <5 <5   AST (SGOT) U/L 20  --  19 18   GLUCOSE mg/dL 103*  --  108* 88       Lab Results   Component Value Date    GLUCOSE 103 (H) 01/14/2025    BUN 26 (H) 01/14/2025    CREATININE 1.00 01/14/2025    EGFRIFNONA 57 (L) 11/10/2021    EGFRIFAFRI 66 11/10/2021    BCR 26.0 (H) 01/14/2025    K 4.3 01/14/2025    CO2 24.6 01/14/2025    CALCIUM 9.4 01/14/2025    ALBUMIN 3.8 01/14/2025    AST 20 01/14/2025    ALT 6 01/14/2025       Lab Results - Last 18 Months   Lab Units 04/05/24  0415   INR  0.99       No results found for: \"IRON\", " "\"TIBC\", \"FERRITIN\"    No results found for: \"FOLATE\"    No results found for: \"OCCULTBLD\"    No results found for: \"RETICCTPCT\"  No results found for: \"TXERCFUO53\"  No results found for: \"SPEP\", \"UPEP\"  No results found for: \"LDH\", \"URICACID\"  No results found for: \"BABAK\", \"RF\", \"SEDRATE\"  No results found for: \"FIBRINOGEN\", \"HAPTOGLOBIN\"  Lab Results   Component Value Date    INR 0.99 04/05/2024     No results found for: \"\"  No results found for: \"CEA\"  No components found for: \"CA-19-9\"  No results found for: \"PSA\"  No results found for: \"SEDRATE\"      Assessment & Plan     Tracy Reddy is a 63 y.o. female with history of pulmonary embolism and bilateral invasive ductal carcinoma status post bilateral mastectomy, chemotherapy and radiation treatments, on Arimidex.  She presented with dyspnea and was found to have a lung mass in the right lower lobe with right hilar and suprahilar lymphadenopathy.     Right lung mass-metastatic ductal carcinoma of the breast -recurrent, ER/AZ positive HER2/nancy 3+ on immunohistochemistry    CT imaging showed mass present within the medial right lower lobe measuring up to 3.2 x 4.1 cm that extends to the right hilar region.  Additional conglomerate of nodes present within right hilar to right suprahilar region measuring up to 2.7 x 1.8 cm.  Status post bronchoscopy EBUS 4/5/2024.    Biopsy confirms metastatic ductal carcinoma of the breast ER/AZ positive, HER2 nu pending.  Further treatment will depend on HER2/nancy status.  Will also send tumor sample out for [employer] NGS testing.  She is on Arimidex right now but will need to switch to an alternate AI or a different agent given mutational analysis findings.  If HER2 positive disease, will use HER2 blockade.  Given there is no other clear areas of metastasis.  We can still consider curative treatment with either radio-ablation or surgical resection.    Discussed at tumor board. Not a surgical candidate based on tumor location. " Radiation possible. Will plan on starting chemo with herceptin perjeta and then consider radiation after 6 cycles.    NGS testing with guardant showing PIK3CA.  See report    Continue herceptin perjeta maintenance    Started treatment tolerating well has some side effects as above not significant to need any dose modification  CT imaging now with good response. Reviewed images independently and showed the patient. Reviewed labs, and notes from other providers.ordered labs    Our plan would be to continue 3 more cycles of Taxotere, Herceptin and Perjeta and then discuss at tumor board again about consolidation with surgery vs radiation. Initial thought was that radiation would be preferable given location.  Continue treatment no changes.    She has completed planned 6 cycles of Taxotere, Herceptin and Perjeta    Resume AI after chemotherapy can start letrozole at that point.    She is currently on letrozole 2.5 mg p.o. daily as well as continue Herceptin Perjeta IV  Has been with vitamin D twice a day      Refer to Dr. Armenta for 1.4 cm right paratracheal node: Surgery not recommended at this time.  Patient receiving XRT to right lung.  After completed we will plan to transition to Kadcyla for maintenance.        Reviewed Kadcyla with patient information was also given to her .  Plan to start Kadcyla third week in January 2025      Will plan to start Kadcyla first week in February 2025.  Reviewed the benefit the side effects in detail.  Also reviewed the results of her pending CT scans on January 23, 2025      Insomnia  - complains more with steroids  Takes melatonin  Continue the same      History of bilateral invasive ductal carcinoma    -Diagnosed in 2020. She is status post bilateral mastectomy with reconstruction in 2021.  She is also completed neoadjuvant chemotherapy with 6 cycles of TCHP.  Taxotere and carboplatin with dose reductions at cycle 2 secondary to chemotherapy-induced nausea and vomiting.  There is  some confusion about her pathology as some reports show HER2 negative, some show positive.  However she did receive adjuvant Herceptin Perjeta maintenance which likely points towards HER2 positive disease.  She is also known to have CHEK2 mutation.  Completed adjuvant Herceptin Perjeta for 1 year.  Status post radiation therapy left chest wall and regional nodes, 5 fractions for a total dose of 5000 cGray. Current treatment with hormone blockade with Arimidex. She followed at [facility] has transferred care to us now         Pneumonia  This has resolved    Osteopenia  DEXA with osteopenia. continue calcium vitamin D.  Continue    Rash  Likely herceptin related  Lasted for a week   Recommend hydrocortisone topical   Resolved    Neutropenia due to chemotherapy    Neutropenic precautions  Add Neulasta with cycle #6  Resolved    Diarrhea   Mild      Needs 2 D echo q 3 months for her 2 directed therapy    Next 2D echo is due 11/15/24.  This has been scheduled    Continue treatment  On chemotherapy requiring intensive monitoring.  Follow-up in 3 weeks    Reviewed PET scan with patient in detail          Follow-up 4 weeks    At the next visit we will decide on when to transition to Kadcyla.  Reviewed      Reviewed 2D echo.  Next is due in February 2025        Electronically signed by Cadence Nazario MD, 01/14/25, 4:58 PM EST.

## 2025-01-14 NOTE — PROGRESS NOTES
Pt here for tx and Dr. Aravind spencer/fredi appt. Port accessed and labs drawn using sterile technique.  Pt tolerated well.  Tx given per MAR.  Pt tolerated well. Pt sent to lobby to wait to be called for Dr. Aravind spencer/fredi appt.

## 2025-01-20 ENCOUNTER — TELEPHONE (OUTPATIENT)
Dept: ONCOLOGY | Facility: CLINIC | Age: 65
End: 2025-01-20

## 2025-01-20 NOTE — TELEPHONE ENCOUNTER
"    Caller: Tracy Reddy \"Radha\"    Relationship to patient: Self    Best call back number: [phone]    Chief complaint: HAD LAST INFUSION 01/14 NEEDING TO SCHEDULE NEXT ONE    Type of visit: INFUSION    Requested date: 02/04 PREFER MORNING APPT TIME  IF CAN BUT WILL DO ANYTIME          "

## 2025-01-21 ENCOUNTER — TELEPHONE (OUTPATIENT)
Dept: ONCOLOGY | Facility: CLINIC | Age: 65
End: 2025-01-21
Payer: COMMERCIAL

## 2025-01-22 ENCOUNTER — TELEPHONE (OUTPATIENT)
Dept: ONCOLOGY | Facility: CLINIC | Age: 65
End: 2025-01-22
Payer: COMMERCIAL

## 2025-01-23 ENCOUNTER — HOSPITAL ENCOUNTER (OUTPATIENT)
Dept: PET IMAGING | Facility: HOSPITAL | Age: 65
Discharge: HOME | End: 2025-01-23
Admitting: INTERNAL MEDICINE
Payer: COMMERCIAL

## 2025-01-23 DIAGNOSIS — C50.911 BREAST CANCER METASTASIZED TO INTRATHORACIC LYMPH NODE, RIGHT: ICD-10-CM

## 2025-01-23 DIAGNOSIS — C77.1 BREAST CANCER METASTASIZED TO INTRATHORACIC LYMPH NODE, RIGHT: ICD-10-CM

## 2025-01-23 PROCEDURE — 71260 CT THORAX DX C+: CPT

## 2025-01-23 PROCEDURE — 25510000001 IOPAMIDOL PER 1 ML: Performed by: INTERNAL MEDICINE

## 2025-01-23 PROCEDURE — 74177 CT ABD & PELVIS W/CONTRAST: CPT

## 2025-01-23 RX ORDER — IOPAMIDOL 755 MG/ML
100 INJECTION, SOLUTION INTRAVASCULAR
Status: COMPLETED | OUTPATIENT
Start: 2025-01-23 | End: 2025-01-23

## 2025-01-23 RX ADMIN — IOPAMIDOL 100 ML: 755 INJECTION, SOLUTION INTRAVENOUS at 08:50

## 2025-01-23 NOTE — PROGRESS NOTES
[facility] RADIATION ONCOLOGY  FOLLOW-UP NOTE    NAME: Tracy Reddy  YOB: 1960  MRN #: [id number]  DATE OF SERVICE: 1/29/2025  REFERRING PROVIDER: Provider, No Known   PRIMARY CARE PROVIDER: Provider, No Known    CHIEF COMPLAINT:  3-month CT imaging follow-up    DIAGNOSIS:   Encounter Diagnosis   Name Primary?    Carcinoma of right breast metastatic to lung Yes      RADIATION TREATMENT COURSE:    10/02/2024 - 10/29/2024: 5000 cGy in 20 fractions (250 cGy/fx) to right lung and intrathoracic lymph nodes      INTERVAL HISTORY     Tracy Reddy is a 64 y.o. 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 She underwent neoadjuvant chemotherapy with 6 cycles TCHP, followed by bilateral mastectomy with implant reconstruction, adjuvant PHESGO and Arimidex, and adjuvant left chest wall and regional lymph node radiation.      More recently, she presented to [facility] on 4/3/2024 for pulmonary complaints. CT imaging on 4/4/2024 showed a mass in the RLL and enlarged right hilar adenopathy. She underwent Ion bronchoscopy with EBUS and FNA of RLL and subcarinal mass on 4/5/2024.  Pathology of both specimens revealed metastatic ductal carcinoma.  PET/CT imaging on 4/12/2024 showed the right hilar mass had an SUV of 16.7 and measured 4.7 x 4.5 cm. She completed radiation therapy to right lung lesion and intrathoracic lymph nodes on 10/29/2024. She returns today for interval imaging follow-up.    1/23/2025: CT CAP showed an interval increase in irregular consolidation groundglass opacity in the right infrahilar region with associated bronchiectasis and volume loss that is most consistent with postradiation changes, interval decrease in size of pericarinal/paratracheal lymph node, and a new 8 mm nodular density in the RML which could represent scarring from radiation but new nodules not excluded.    The patient reports she has had a viral  infection over the past few weeks.  She feels like her symptoms were exacerbated by the cold weather.  She continues to have a cough but feels like things might be heading in the right direction.  She has no other concerns or complaints at this time      IMAGING     CT CAP With Contrast Diagnostic  [facility]  1/25/2025  Impression: 1.Interval increase in irregular consolidation and groundglass opacity in the right infrahilar region with associated bronchiectasis and volume loss. Findings are most consistent with post radiation changes. Recurrent malignancy is less favored in this region. 2.Interval decrease in size of precarinal/pretracheal lymph node. 3.New 8 mm nodular density in the right middle lobe. This could represent some nodular scarring from the post radiation changes, but a new pulmonary nodule is not excluded. Recommend attention on follow-up imaging. Electronically Signed: Saravanan Barragan MD  1/25/2025 4:03 PM EST  Workstation ID: [id number]      PATHOLOGY     No new pathology to review.      LABS     HEMATOLOGY:  WBC   Date Value Ref Range Status   01/14/2025 6.68 3.40 - 10.80 10*3/mm3 Final   10/24/2023 7.0 3.7 - 10.3 x10(3)/mcL Final     RBC   Date Value Ref Range Status   01/14/2025 4.53 3.77 - 5.28 10*6/mm3 Final   10/24/2023 4.75 3.90 - 5.20 x10(6)/mcL Final     Hemoglobin   Date Value Ref Range Status   01/14/2025 12.1 12.0 - 15.9 g/dL Final   10/24/2023 13.7 11.2 - 15.7 g/dL Final     Hematocrit   Date Value Ref Range Status   01/14/2025 39.5 34.0 - 46.6 % Final   10/24/2023 41.7 34.0 - 45.0 % Final     Platelets   Date Value Ref Range Status   01/14/2025 203 140 - 450 10*3/mm3 Final   10/24/2023 199 155 - 369 x10(3)/mcL Final     CHEMISTRY:  Lab Results   Component Value Date    GLUCOSE 103 (H) 01/14/2025    BUN 26 (H) 01/14/2025    CREATININE 1.00 01/14/2025    EGFRIFNONA 57 (L) 11/10/2021    EGFRIFAFRI 66 11/10/2021    BCR 26.0 (H) 01/14/2025    K 4.3 01/14/2025    CO2 24.6 01/14/2025     CALCIUM 9.4 01/14/2025    ALBUMIN 3.8 01/14/2025    AST 20 01/14/2025    ALT 6 01/14/2025       PROBLEM LIST     Patient Active Problem List   Diagnosis    Abnormal CT of the chest    History of breast cancer    Dyspnea    Fever    Lung mass    Upper respiratory infection, acute    Aromatase inhibitor use    Bilateral malignant neoplasm of breast in female    Encounter for ongoing osteoporosis bisphosphonate therapy    Menopause    Monoallelic mutation of CHEK2 gene in female patient    Low bone density in premenopausal patient    Invasive ductal carcinoma of breast, female, right    Invasive ductal carcinoma of breast, female, left    Breast cancer metastasized to lung    Dehydration    Encounter for care related to vascular access port    Breast cancer metastasized to intrathoracic lymph node, right        CURRENT MEDICATIONS     Current Outpatient Medications   Medication Instructions    Calcium Carb-Cholecalciferol (Calcium + Vitamin D3) 500-5 MG-MCG tablet per tablet 1 tablet, Oral, 2 Times Daily    Calcium Citrate-Vitamin D (Citrus Calcium/Vitamin D) 200-6.25 MG-MCG tablet Take  by mouth.    cetirizine (ZYRTEC) 10 mg, Daily    letrozole (FEMARA) 2.5 mg, Oral, Daily    Lidocaine Viscous HCl (XYLOCAINE) 2 % solution 10 mL, Oral, As Needed    omeprazole (PRILOSEC) 20 mg, Oral, Daily    sucralfate (CARAFATE) 1 g, Oral, 4 Times Daily With Meals & Nightly        ALLERGIES     Allergies   Allergen Reactions    Penicillins Rash         REVIEW OF SYSTEMS     Review of Systems   Constitutional:  Negative for activity change and fatigue.   Respiratory:  Positive for cough.         Vitals:    01/29/25 0912   BP: 112/76   Pulse: 104   SpO2: 94%      Physical Exam  Constitutional:       General: She is not in acute distress.  HENT:      Head: Normocephalic and atraumatic.   Pulmonary:      Effort: Pulmonary effort is normal. No respiratory distress.   Neurological:      Mental Status: She is alert and oriented to person,  place, and time. Mental status is at baseline.   Psychiatric:         Mood and Affect: Mood normal.         Behavior: Behavior normal.            ECOG:  Restricted in physically strenuous activity but ambulatory and able to carry out work of a light or sedentary nature, e.g., light house work, office work = 1      ASSESSMENT AND PLAN     ASSESSMENT:      Tracy Reddy is a 64 y.o. 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 She underwent neoadjuvant chemotherapy with 6 cycles TCHP, followed by bilateral mastectomy with implant reconstruction, adjuvant PHESGO and Arimidex, and adjuvant left chest wall and regional lymph node radiation.      More recently, she was diagnosed with metastatic breast cancer in the right lower lobe and subcarinal regions. She completed radiation therapy to right lung lesion and intrathoracic lymph nodes on 10/29/2024. She returns today for interval imaging follow-up.      Diagnoses and all orders for this visit:    1. Carcinoma of right breast metastatic to lung (Primary)  -     CT Chest With Contrast Diagnostic; Future  -     CT Abdomen Pelvis With Contrast; Future       PLAN:      Orders:  - CT CAP in 3 months  - Follow-up after imaging or sooner as clinically indicated    We reviewed the patient's recent imaging.  We discussed the likely findings of postradiation change in the right lung.  We also discussed the interval reduction in size of her mediastinal lymph nodes.  We discussed plans for continued imaging surveillance with repeat imaging in 3 months or sooner as clinically indicated.  The patient is encouraged to reach out with questions or concerns prior to her next appointment.    We did review the patient's recent respiratory symptoms.  We discussed the potential possibility of radiation pneumonitis.  We discussed treatment options for radiation pneumonitis if her symptoms do not improve in the coming days and weeks.   The patient expressed understanding.  She will reach out if her symptoms do not resolve or if they worsen.    I spent 30 minutes caring for Tracy on this date of service. This time includes time spent by me in the following activities:preparing for the visit, reviewing tests, obtaining and/or reviewing a separately obtained history, performing a medically appropriate examination and/or evaluation , counseling and educating the patient/family/caregiver, ordering medications, tests, or procedures, documenting information in the medical record, and independently interpreting results and communicating that information with the patient/family/caregiver      FOLLOW UP     No follow-ups on file.     CC: Provider, No Known Provider, No Known

## 2025-01-29 ENCOUNTER — OFFICE VISIT (OUTPATIENT)
Dept: RADIATION ONCOLOGY | Facility: HOSPITAL | Age: 65
End: 2025-01-29
Payer: COMMERCIAL

## 2025-01-29 VITALS
SYSTOLIC BLOOD PRESSURE: 112 MMHG | OXYGEN SATURATION: 94 % | BODY MASS INDEX: 30.21 KG/M2 | WEIGHT: 176 LBS | DIASTOLIC BLOOD PRESSURE: 76 MMHG | HEART RATE: 104 BPM

## 2025-01-29 DIAGNOSIS — C50.911 CARCINOMA OF RIGHT BREAST METASTATIC TO LUNG: Primary | ICD-10-CM

## 2025-01-29 DIAGNOSIS — C78.00 CARCINOMA OF RIGHT BREAST METASTATIC TO LUNG: Primary | ICD-10-CM

## 2025-01-29 PROCEDURE — G0463 HOSPITAL OUTPT CLINIC VISIT: HCPCS | Performed by: INTERNAL MEDICINE

## 2025-02-03 ENCOUNTER — TELEPHONE (OUTPATIENT)
Dept: ONCOLOGY | Facility: CLINIC | Age: 65
End: 2025-02-03
Payer: COMMERCIAL

## 2025-02-03 NOTE — TELEPHONE ENCOUNTER
Called pt to let her know that we do not have authorization from her insurance for Kadcyla. I told her that I attempted to speak to someone with her insurance, but was unable to reach anyone. I advised her that we would get her back on the schedule once we have the authorization. Pt verbalized understanding. No questions or needs at this time.

## 2025-02-07 ENCOUNTER — TELEPHONE (OUTPATIENT)
Dept: ONCOLOGY | Facility: CLINIC | Age: 65
End: 2025-02-07
Payer: COMMERCIAL

## 2025-02-07 NOTE — TELEPHONE ENCOUNTER
"  Caller: Tracy Reddy \"Radha\"    Relationship: Self    Best call back number: [phone]    What is the best time to reach you: ANYTIME    Who are you requesting to speak with (clinical staff, provider,  specific staff member): SCHEDULING    What was the call regarding: PT CALLING TO SEE IF HER INSURANCE HAS BEEN APPROVED FOR HER INFUSIONS     PLEASE ADVISE        "

## 2025-02-07 NOTE — TELEPHONE ENCOUNTER
Spoke with patient to let her know that insurance is still pending for her infusion and we will keep her updated

## 2025-02-20 ENCOUNTER — TELEPHONE (OUTPATIENT)
Dept: ONCOLOGY | Facility: CLINIC | Age: 65
End: 2025-02-20
Payer: COMMERCIAL

## 2025-02-20 NOTE — TELEPHONE ENCOUNTER
"  Hub staff attempted to follow warm transfer process and was unsuccessful     Caller: Tracy Reddy \"Radha\"    Relationship to patient: Self    Best call back number: [phone]     Patient is needing: PT IS CALLING LINDA     PT IS AVAILABLE WEDNESDAY OR THURSDAY OF NEXT WEEK. PT WOULD PREFER THURSDAY AFTERNOON, BUT EITHER DAY WOULD BE FINE.     GO AHEAD AND SCHEDULE IF AVAILABLE AND LEAVE PT A MESSAGE WITH INFO.           "

## 2025-02-25 ENCOUNTER — HOSPITAL ENCOUNTER (OUTPATIENT)
Dept: CARDIOLOGY | Facility: HOSPITAL | Age: 65
Discharge: HOME | End: 2025-02-25
Admitting: INTERNAL MEDICINE
Payer: COMMERCIAL

## 2025-02-25 VITALS
DIASTOLIC BLOOD PRESSURE: 67 MMHG | BODY MASS INDEX: 30.05 KG/M2 | HEART RATE: 102 BPM | HEIGHT: 64 IN | SYSTOLIC BLOOD PRESSURE: 124 MMHG | WEIGHT: 176 LBS

## 2025-02-25 DIAGNOSIS — Z79.899 ENCOUNTER FOR MONITORING CARDIOTOXIC DRUG THERAPY: ICD-10-CM

## 2025-02-25 DIAGNOSIS — Z51.81 ENCOUNTER FOR MONITORING CARDIOTOXIC DRUG THERAPY: ICD-10-CM

## 2025-02-25 PROCEDURE — 93356 MYOCRD STRAIN IMG SPCKL TRCK: CPT

## 2025-02-25 PROCEDURE — 93306 TTE W/DOPPLER COMPLETE: CPT

## 2025-02-26 ENCOUNTER — PATIENT OUTREACH (OUTPATIENT)
Dept: ONCOLOGY | Facility: CLINIC | Age: 65
End: 2025-02-26
Payer: COMMERCIAL

## 2025-02-26 LAB
AORTIC DIMENSIONLESS INDEX: 0.79 (DI)
AV MEAN PRESS GRAD SYS DOP V1V2: 3.9 MMHG
AV VMAX SYS DOP: 130.3 CM/SEC
BH CV ECHO LEFT VENTRICLE GLOBAL LONGITUDINAL STRAIN: -19.8 %
BH CV ECHO MEAS - ACS: 1.78 CM
BH CV ECHO MEAS - AO MAX PG: 6.8 MMHG
BH CV ECHO MEAS - AO ROOT DIAM: 3.3 CM
BH CV ECHO MEAS - AO V2 VTI: 23.8 CM
BH CV ECHO MEAS - AVA(I,D): 2.11 CM2
BH CV ECHO MEAS - EDV(CUBED): 46.4 ML
BH CV ECHO MEAS - EDV(MOD-SP2): 51.6 ML
BH CV ECHO MEAS - EDV(MOD-SP4): 52.6 ML
BH CV ECHO MEAS - EF(MOD-SP2): 58 %
BH CV ECHO MEAS - EF(MOD-SP4): 57.8 %
BH CV ECHO MEAS - ESV(CUBED): 9.4 ML
BH CV ECHO MEAS - ESV(MOD-SP2): 21.7 ML
BH CV ECHO MEAS - ESV(MOD-SP4): 22.2 ML
BH CV ECHO MEAS - FS: 41.2 %
BH CV ECHO MEAS - IVS/LVPW: 0.95 CM
BH CV ECHO MEAS - IVSD: 0.75 CM
BH CV ECHO MEAS - LA A2CS (ATRIAL LENGTH): 4.6 CM
BH CV ECHO MEAS - LA A4C LENGTH: 5.2 CM
BH CV ECHO MEAS - LA DIMENSION: 3.2 CM
BH CV ECHO MEAS - LAT PEAK E' VEL: 10.3 CM/SEC
BH CV ECHO MEAS - LV MASS(C)D: 74.8 GRAMS
BH CV ECHO MEAS - LV MAX PG: 4.1 MMHG
BH CV ECHO MEAS - LV MEAN PG: 2.5 MMHG
BH CV ECHO MEAS - LV V1 MAX: 100.8 CM/SEC
BH CV ECHO MEAS - LV V1 VTI: 19 CM
BH CV ECHO MEAS - LVIDD: 3.6 CM
BH CV ECHO MEAS - LVIDS: 2.11 CM
BH CV ECHO MEAS - LVOT AREA: 2.6 CM2
BH CV ECHO MEAS - LVOT DIAM: 1.84 CM
BH CV ECHO MEAS - LVPWD: 0.79 CM
BH CV ECHO MEAS - MED PEAK E' VEL: 7.6 CM/SEC
BH CV ECHO MEAS - MV A MAX VEL: 88.5 CM/SEC
BH CV ECHO MEAS - MV DEC SLOPE: 463.4 CM/SEC2
BH CV ECHO MEAS - MV DEC TIME: 0.15 SEC
BH CV ECHO MEAS - MV E MAX VEL: 69.6 CM/SEC
BH CV ECHO MEAS - MV E/A: 0.79
BH CV ECHO MEAS - MV MAX PG: 4 MMHG
BH CV ECHO MEAS - MV MEAN PG: 2.12 MMHG
BH CV ECHO MEAS - MV V2 VTI: 16.3 CM
BH CV ECHO MEAS - MVA(VTI): 3.1 CM2
BH CV ECHO MEAS - PA ACC TIME: 0.11 SEC
BH CV ECHO MEAS - PA V2 MAX: 100.6 CM/SEC
BH CV ECHO MEAS - PULM A REVS DUR: 0.1 SEC
BH CV ECHO MEAS - PULM A REVS VEL: 38.5 CM/SEC
BH CV ECHO MEAS - PULM DIAS VEL: 42.7 CM/SEC
BH CV ECHO MEAS - PULM S/D: 1.08
BH CV ECHO MEAS - PULM SYS VEL: 46.1 CM/SEC
BH CV ECHO MEAS - QP/QS: 0.93
BH CV ECHO MEAS - RAP SYSTOLE: 3 MMHG
BH CV ECHO MEAS - RV MAX PG: 1.2 MMHG
BH CV ECHO MEAS - RV V1 MAX: 54.7 CM/SEC
BH CV ECHO MEAS - RV V1 VTI: 9.9 CM
BH CV ECHO MEAS - RVDD: 2.4 CM
BH CV ECHO MEAS - RVOT DIAM: 2.45 CM
BH CV ECHO MEAS - RVSP: 22 MMHG
BH CV ECHO MEAS - SV(LVOT): 50.2 ML
BH CV ECHO MEAS - SV(MOD-SP2): 30 ML
BH CV ECHO MEAS - SV(MOD-SP4): 30.4 ML
BH CV ECHO MEAS - SV(RVOT): 46.8 ML
BH CV ECHO MEAS - TAPSE (>1.6): 1.79 CM
BH CV ECHO MEAS - TR MAX PG: 19.3 MMHG
BH CV ECHO MEAS - TR MAX VEL: 219.5 CM/SEC
BH CV ECHO MEASUREMENTS AVERAGE E/E' RATIO: 7.78
BH CV XLRA - RV BASE: 2.8 CM
BH CV XLRA - RV MID: 1.7 CM
BH CV XLRA - TDI S': 10.4 CM/SEC
LEFT ATRIUM VOLUME INDEX: 17.2 ML/M2
LEFT ATRIUM VOLUME: 30 ML
LV EF BIPLANE MOD: 58 %

## 2025-03-03 ENCOUNTER — TELEPHONE (OUTPATIENT)
Dept: ONCOLOGY | Facility: CLINIC | Age: 65
End: 2025-03-03
Payer: COMMERCIAL

## 2025-03-03 NOTE — TELEPHONE ENCOUNTER
"  Hub staff attempted to follow warm transfer process and was unsuccessful     Caller: Tracy Reddy \"Radha\"    Relationship to patient: Self    Best call back number: [phone]    Patient is needing: PT IS SICK AND CALLING TO R/S HER 3/4 APPTS.  ASKING FOR EITHER 3/6 OR 3/7        "

## 2025-03-05 DIAGNOSIS — C50.911 BILATERAL MALIGNANT NEOPLASM OF BREAST IN FEMALE, ESTROGEN RECEPTOR POSITIVE, UNSPECIFIED SITE OF BREAST: Primary | ICD-10-CM

## 2025-03-05 DIAGNOSIS — C50.912 BILATERAL MALIGNANT NEOPLASM OF BREAST IN FEMALE, ESTROGEN RECEPTOR POSITIVE, UNSPECIFIED SITE OF BREAST: Primary | ICD-10-CM

## 2025-03-05 DIAGNOSIS — Z17.0 BILATERAL MALIGNANT NEOPLASM OF BREAST IN FEMALE, ESTROGEN RECEPTOR POSITIVE, UNSPECIFIED SITE OF BREAST: Primary | ICD-10-CM

## 2025-03-06 ENCOUNTER — TELEPHONE (OUTPATIENT)
Dept: ONCOLOGY | Facility: CLINIC | Age: 65
End: 2025-03-06
Payer: COMMERCIAL

## 2025-03-06 ENCOUNTER — OFFICE VISIT (OUTPATIENT)
Dept: PHARMACY | Facility: HOSPITAL | Age: 65
End: 2025-03-06
Payer: COMMERCIAL

## 2025-03-06 ENCOUNTER — LAB (OUTPATIENT)
Dept: LAB | Facility: HOSPITAL | Age: 65
End: 2025-03-06
Payer: COMMERCIAL

## 2025-03-06 DIAGNOSIS — C50.912 BILATERAL MALIGNANT NEOPLASM OF BREAST IN FEMALE, ESTROGEN RECEPTOR POSITIVE, UNSPECIFIED SITE OF BREAST: ICD-10-CM

## 2025-03-06 DIAGNOSIS — C50.911 BILATERAL MALIGNANT NEOPLASM OF BREAST IN FEMALE, ESTROGEN RECEPTOR POSITIVE, UNSPECIFIED SITE OF BREAST: ICD-10-CM

## 2025-03-06 DIAGNOSIS — Z17.0 BILATERAL MALIGNANT NEOPLASM OF BREAST IN FEMALE, ESTROGEN RECEPTOR POSITIVE, UNSPECIFIED SITE OF BREAST: ICD-10-CM

## 2025-03-06 DIAGNOSIS — Z17.0 BILATERAL MALIGNANT NEOPLASM OF BREAST IN FEMALE, ESTROGEN RECEPTOR POSITIVE, UNSPECIFIED SITE OF BREAST: Primary | ICD-10-CM

## 2025-03-06 DIAGNOSIS — C50.911 BILATERAL MALIGNANT NEOPLASM OF BREAST IN FEMALE, ESTROGEN RECEPTOR POSITIVE, UNSPECIFIED SITE OF BREAST: Primary | ICD-10-CM

## 2025-03-06 DIAGNOSIS — C50.912 BILATERAL MALIGNANT NEOPLASM OF BREAST IN FEMALE, ESTROGEN RECEPTOR POSITIVE, UNSPECIFIED SITE OF BREAST: Primary | ICD-10-CM

## 2025-03-06 LAB
ALBUMIN SERPL-MCNC: 4 G/DL (ref 3.5–5.2)
ALBUMIN/GLOB SERPL: 1.3 G/DL
ALP SERPL-CCNC: 91 U/L (ref 39–117)
ALT SERPL W P-5'-P-CCNC: 11 U/L (ref 1–33)
ANION GAP SERPL CALCULATED.3IONS-SCNC: 13.3 MMOL/L (ref 5–15)
AST SERPL-CCNC: 26 U/L (ref 1–32)
BASOPHILS # BLD AUTO: 0.02 10*3/MM3 (ref 0–0.2)
BASOPHILS NFR BLD AUTO: 0.3 % (ref 0–1.5)
BILIRUB SERPL-MCNC: 0.6 MG/DL (ref 0–1.2)
BUN SERPL-MCNC: 19 MG/DL (ref 8–23)
BUN/CREAT SERPL: 19 (ref 7–25)
CALCIUM SPEC-SCNC: 9.5 MG/DL (ref 8.6–10.5)
CHLORIDE SERPL-SCNC: 104 MMOL/L (ref 98–107)
CO2 SERPL-SCNC: 23.7 MMOL/L (ref 22–29)
CREAT SERPL-MCNC: 1 MG/DL (ref 0.57–1)
DEPRECATED RDW RBC AUTO: 50.6 FL (ref 37–54)
EGFRCR SERPLBLD CKD-EPI 2021: 63.4 ML/MIN/1.73
EOSINOPHIL # BLD AUTO: 0.27 10*3/MM3 (ref 0–0.4)
EOSINOPHIL NFR BLD AUTO: 4.3 % (ref 0.3–6.2)
ERYTHROCYTE [DISTWIDTH] IN BLOOD BY AUTOMATED COUNT: 15.7 % (ref 12.3–15.4)
GLOBULIN UR ELPH-MCNC: 3.1 GM/DL
GLUCOSE SERPL-MCNC: 100 MG/DL (ref 65–99)
HCT VFR BLD AUTO: 44.5 % (ref 34–46.6)
HGB BLD-MCNC: 13.6 G/DL (ref 12–15.9)
LYMPHOCYTES # BLD AUTO: 0.76 10*3/MM3 (ref 0.7–3.1)
LYMPHOCYTES NFR BLD AUTO: 12.2 % (ref 19.6–45.3)
MCH RBC QN AUTO: 26.9 PG (ref 26.6–33)
MCHC RBC AUTO-ENTMCNC: 30.6 G/DL (ref 31.5–35.7)
MCV RBC AUTO: 88.1 FL (ref 79–97)
MONOCYTES # BLD AUTO: 0.68 10*3/MM3 (ref 0.1–0.9)
MONOCYTES NFR BLD AUTO: 11 % (ref 5–12)
NEUTROPHILS NFR BLD AUTO: 4.48 10*3/MM3 (ref 1.7–7)
NEUTROPHILS NFR BLD AUTO: 72.2 % (ref 42.7–76)
PLATELET # BLD AUTO: 204 10*3/MM3 (ref 140–450)
PMV BLD AUTO: 9.1 FL (ref 6–12)
POTASSIUM SERPL-SCNC: 3.8 MMOL/L (ref 3.5–5.2)
PROT SERPL-MCNC: 7.1 G/DL (ref 6–8.5)
RBC # BLD AUTO: 5.05 10*6/MM3 (ref 3.77–5.28)
SODIUM SERPL-SCNC: 141 MMOL/L (ref 136–145)
WBC NRBC COR # BLD AUTO: 6.21 10*3/MM3 (ref 3.4–10.8)

## 2025-03-06 PROCEDURE — 36415 COLL VENOUS BLD VENIPUNCTURE: CPT

## 2025-03-06 PROCEDURE — 80053 COMPREHEN METABOLIC PANEL: CPT | Performed by: INTERNAL MEDICINE

## 2025-03-06 PROCEDURE — 85025 COMPLETE CBC W/AUTO DIFF WBC: CPT

## 2025-03-06 RX ORDER — ONDANSETRON 8 MG/1
8 TABLET, FILM COATED ORAL 3 TIMES DAILY PRN
Qty: 30 TABLET | Refills: 5 | Status: SHIPPED | OUTPATIENT
Start: 2025-03-06

## 2025-03-06 RX ORDER — SODIUM CHLORIDE 9 MG/ML
20 INJECTION, SOLUTION INTRAVENOUS ONCE
Status: CANCELLED | OUTPATIENT
Start: 2025-03-07

## 2025-03-06 NOTE — PROGRESS NOTES
Pharmacy Patient Education Note          Tracy Reddy is a 64 y.o. female being seen at [facility] by Dr. Nazario for a diagnosis of Bilateral Malignant neoplasm of breast with a plan to begin treatment with Ado-Trastuzumab Emtansine.       Current Outpatient Medications:     Calcium Carb-Cholecalciferol (Calcium + Vitamin D3) 500-5 MG-MCG tablet per tablet, Take 1 tablet by mouth 2 (Two) Times a Day., Disp: 60 tablet, Rfl: 6    Calcium Citrate-Vitamin D (Citrus Calcium/Vitamin D) 200-6.25 MG-MCG tablet, Take  by mouth., Disp: , Rfl:     cetirizine (zyrTEC) 10 MG tablet, Take 1 tablet by mouth Daily., Disp: , Rfl:     letrozole (FEMARA) 2.5 MG tablet, Take 1 tablet by mouth Daily., Disp: 30 tablet, Rfl: 6    omeprazole (priLOSEC) 20 MG capsule, Take 1 capsule by mouth Daily., Disp: 30 capsule, Rfl: 5    ondansetron (ZOFRAN) 8 MG tablet, Take 1 tablet by mouth 3 (Three) Times a Day As Needed for Nausea or Vomiting., Disp: 30 tablet, Rfl: 5    Pharmacist reviewed regimen, as detailed below, with patient.     The discussion included the dose, route, and frequency of administration. Most common side and clinically significant effects were discussed including thrombocytopenia, changes in liver function, constipation, nausea, neuropathy, fatigue, muscle/joint pain, headache and pneumonitis.  .     Precautions reviewed include:   Infection prevention precautions were reviewed including hand washing, food safety and the avoidance of crowds/use of mask.   Bleeding precautions including the use of soft bristle toothbrush, electric razor and precautions against falls were discussed. Importance of appropriate hygiene and self-care for nausea, anorexia, and mucositis reviewed.  Anti-emetic prevention and treatment with dexamethasone and ondansetron.     Patient was counseled on when to notify a provider including in the event of the following:  Signs and symptoms of infection, including temperature  >100.4  Signs and symptoms of serious bleeding including blood in the urine or stool  Changes in urinary output  Frequent nausea/vomiting or diarrhea or severe abdominal pain  The patient was provided with general information on when to call the office for adverse events.      Sent in prescription for Ondansetron from therapy plan to patient's preferred pharmacy.      Patient provided with handout regarding medications, and reviewed general plan for chemotherapy administration. Patient engaged in counseling throughout. Allowed time for patient to ask questions. Patient without any further questions at this time and verbalized understanding.    Anette White RPH  10:11 EST

## 2025-03-07 ENCOUNTER — HOSPITAL ENCOUNTER (OUTPATIENT)
Dept: ONCOLOGY | Facility: HOSPITAL | Age: 65
Discharge: HOME | End: 2025-03-07
Payer: COMMERCIAL

## 2025-03-07 VITALS
HEIGHT: 64 IN | DIASTOLIC BLOOD PRESSURE: 70 MMHG | WEIGHT: 179.1 LBS | OXYGEN SATURATION: 94 % | HEART RATE: 90 BPM | TEMPERATURE: 97.8 F | SYSTOLIC BLOOD PRESSURE: 105 MMHG | BODY MASS INDEX: 30.58 KG/M2

## 2025-03-07 DIAGNOSIS — C50.912 BILATERAL MALIGNANT NEOPLASM OF BREAST IN FEMALE, ESTROGEN RECEPTOR POSITIVE, UNSPECIFIED SITE OF BREAST: Primary | ICD-10-CM

## 2025-03-07 DIAGNOSIS — C50.911 BILATERAL MALIGNANT NEOPLASM OF BREAST IN FEMALE, ESTROGEN RECEPTOR POSITIVE, UNSPECIFIED SITE OF BREAST: Primary | ICD-10-CM

## 2025-03-07 DIAGNOSIS — R91.8 LUNG MASS: ICD-10-CM

## 2025-03-07 DIAGNOSIS — Z17.0 BILATERAL MALIGNANT NEOPLASM OF BREAST IN FEMALE, ESTROGEN RECEPTOR POSITIVE, UNSPECIFIED SITE OF BREAST: Primary | ICD-10-CM

## 2025-03-07 PROCEDURE — 25010000002 ADO-TRASTUZUMAB 100 MG RECONSTITUTED SOLUTION 1 EACH VIAL: Performed by: INTERNAL MEDICINE

## 2025-03-07 PROCEDURE — 25010000002 DEXAMETHASONE PER 1 MG: Performed by: INTERNAL MEDICINE

## 2025-03-07 PROCEDURE — 96415 CHEMO IV INFUSION ADDL HR: CPT

## 2025-03-07 PROCEDURE — 96413 CHEMO IV INFUSION 1 HR: CPT

## 2025-03-07 PROCEDURE — 25810000003 SODIUM CHLORIDE 0.9 % SOLUTION 250 ML FLEX CONT: Performed by: INTERNAL MEDICINE

## 2025-03-07 PROCEDURE — 96375 TX/PRO/DX INJ NEW DRUG ADDON: CPT

## 2025-03-07 PROCEDURE — 25010000002 HEPARIN LOCK FLUSH PER 10 UNITS: Performed by: INTERNAL MEDICINE

## 2025-03-07 RX ORDER — SODIUM CHLORIDE 0.9 % (FLUSH) 0.9 %
10 SYRINGE (ML) INJECTION AS NEEDED
OUTPATIENT
Start: 2025-03-07

## 2025-03-07 RX ORDER — HEPARIN SODIUM (PORCINE) LOCK FLUSH IV SOLN 100 UNIT/ML 100 UNIT/ML
500 SOLUTION INTRAVENOUS AS NEEDED
OUTPATIENT
Start: 2025-03-07

## 2025-03-07 RX ORDER — SODIUM CHLORIDE 9 MG/ML
20 INJECTION, SOLUTION INTRAVENOUS ONCE
Status: COMPLETED | OUTPATIENT
Start: 2025-03-07 | End: 2025-03-07

## 2025-03-07 RX ORDER — DEXAMETHASONE SODIUM PHOSPHATE 4 MG/ML
12 INJECTION, SOLUTION INTRA-ARTICULAR; INTRALESIONAL; INTRAMUSCULAR; INTRAVENOUS; SOFT TISSUE ONCE
Status: COMPLETED | OUTPATIENT
Start: 2025-03-07 | End: 2025-03-07

## 2025-03-07 RX ORDER — SODIUM CHLORIDE 0.9 % (FLUSH) 0.9 %
10 SYRINGE (ML) INJECTION AS NEEDED
Status: DISCONTINUED | OUTPATIENT
Start: 2025-03-07 | End: 2025-03-08 | Stop reason: HOSPADM

## 2025-03-07 RX ORDER — HEPARIN SODIUM (PORCINE) LOCK FLUSH IV SOLN 100 UNIT/ML 100 UNIT/ML
500 SOLUTION INTRAVENOUS AS NEEDED
Status: DISCONTINUED | OUTPATIENT
Start: 2025-03-07 | End: 2025-03-08 | Stop reason: HOSPADM

## 2025-03-07 RX ADMIN — HEPARIN 500 UNITS: 100 SYRINGE at 10:35

## 2025-03-07 RX ADMIN — Medication 10 ML: at 10:36

## 2025-03-07 RX ADMIN — SODIUM CHLORIDE 20 ML/HR: 9 INJECTION, SOLUTION INTRAVENOUS at 08:36

## 2025-03-07 RX ADMIN — SODIUM CHLORIDE 285 MG: 9 INJECTION, SOLUTION INTRAVENOUS at 08:54

## 2025-03-07 RX ADMIN — DEXAMETHASONE SODIUM PHOSPHATE 12 MG: 4 INJECTION, SOLUTION INTRAMUSCULAR; INTRAVENOUS at 08:36

## 2025-03-07 NOTE — PROGRESS NOTES
Pt here for C1D1 kadcyla, labs and echo  done prior to appt,Meds given per  MAR,infusion tolerated well.Pt discharged and AVS given.

## 2025-03-31 ENCOUNTER — HOSPITAL ENCOUNTER (OUTPATIENT)
Dept: ONCOLOGY | Facility: HOSPITAL | Age: 65
Discharge: HOME | End: 2025-03-31
Payer: COMMERCIAL

## 2025-03-31 VITALS
HEART RATE: 98 BPM | OXYGEN SATURATION: 94 % | HEIGHT: 64 IN | DIASTOLIC BLOOD PRESSURE: 70 MMHG | WEIGHT: 179 LBS | TEMPERATURE: 97.5 F | SYSTOLIC BLOOD PRESSURE: 104 MMHG | BODY MASS INDEX: 30.56 KG/M2 | RESPIRATION RATE: 14 BRPM

## 2025-03-31 DIAGNOSIS — C50.911 BREAST CANCER METASTASIZED TO INTRATHORACIC LYMPH NODE, RIGHT: Primary | ICD-10-CM

## 2025-03-31 DIAGNOSIS — C50.912 BILATERAL MALIGNANT NEOPLASM OF BREAST IN FEMALE, ESTROGEN RECEPTOR POSITIVE, UNSPECIFIED SITE OF BREAST: ICD-10-CM

## 2025-03-31 DIAGNOSIS — R91.8 LUNG MASS: ICD-10-CM

## 2025-03-31 DIAGNOSIS — Z17.0 BILATERAL MALIGNANT NEOPLASM OF BREAST IN FEMALE, ESTROGEN RECEPTOR POSITIVE, UNSPECIFIED SITE OF BREAST: ICD-10-CM

## 2025-03-31 DIAGNOSIS — C77.1 BREAST CANCER METASTASIZED TO INTRATHORACIC LYMPH NODE, RIGHT: Primary | ICD-10-CM

## 2025-03-31 DIAGNOSIS — C50.911 BILATERAL MALIGNANT NEOPLASM OF BREAST IN FEMALE, ESTROGEN RECEPTOR POSITIVE, UNSPECIFIED SITE OF BREAST: ICD-10-CM

## 2025-03-31 LAB
ALP BLD-CCNC: 76 U/L (ref 42–141)
BASOPHILS # BLD AUTO: 0.06 10*3/MM3 (ref 0–0.2)
BASOPHILS NFR BLD AUTO: 1.1 % (ref 0–1.5)
BUN BLDA-MCNC: 22 MG/DL (ref 7–22)
CALCIUM BLD QL: 9.8 MG/DL (ref 8–10.3)
CHLORIDE BLDA-SCNC: 108 MMOL/L (ref 98–108)
CO2 BLDA-SCNC: 27 MMOL/L (ref 18–33)
CREAT BLDA-MCNC: 1 MG/DL (ref 0.6–1.2)
DEPRECATED RDW RBC AUTO: 47.4 FL (ref 37–54)
EGFRCR SERPLBLD CKD-EPI 2021: 63.4 ML/MIN/1.73
EOSINOPHIL # BLD AUTO: 0.38 10*3/MM3 (ref 0–0.4)
EOSINOPHIL NFR BLD AUTO: 6.7 % (ref 0.3–6.2)
ERYTHROCYTE [DISTWIDTH] IN BLOOD BY AUTOMATED COUNT: 15.9 % (ref 12.3–15.4)
GLUCOSE BLDC GLUCOMTR-MCNC: 95 MG/DL (ref 73–118)
HCT VFR BLD AUTO: 39.6 % (ref 34–46.6)
HGB BLD-MCNC: 13.3 G/DL (ref 12–15.9)
LYMPHOCYTES # BLD AUTO: 0.86 10*3/MM3 (ref 0.7–3.1)
LYMPHOCYTES NFR BLD AUTO: 15.1 % (ref 19.6–45.3)
MCH RBC QN AUTO: 27.6 PG (ref 26.6–33)
MCHC RBC AUTO-ENTMCNC: 33.6 G/DL (ref 31.5–35.7)
MCV RBC AUTO: 82.2 FL (ref 79–97)
MONOCYTES # BLD AUTO: 0.55 10*3/MM3 (ref 0.1–0.9)
MONOCYTES NFR BLD AUTO: 9.7 % (ref 5–12)
NEUTROPHILS NFR BLD AUTO: 3.83 10*3/MM3 (ref 1.7–7)
NEUTROPHILS NFR BLD AUTO: 67.4 % (ref 42.7–76)
PLATELET # BLD AUTO: 219 10*3/MM3 (ref 140–450)
PMV BLD AUTO: 8.4 FL (ref 6–12)
POC ALBUMIN: 3.6 G/L (ref 3.3–5.5)
POC ALT (SGPT): 18 U/L (ref 10–47)
POC AST (SGOT): 31 U/L (ref 11–38)
POC TOTAL BILIRUBIN: 0.9 MG/DL (ref 0.2–1.6)
POC TOTAL PROTEIN: 7.1 G/DL (ref 6.4–8.1)
POTASSIUM BLDA-SCNC: 4.6 MMOL/L (ref 3.6–5.1)
RBC # BLD AUTO: 4.82 10*6/MM3 (ref 3.77–5.28)
SODIUM BLD-SCNC: 148 MMOL/L (ref 128–145)
WBC NRBC COR # BLD AUTO: 5.68 10*3/MM3 (ref 3.4–10.8)

## 2025-03-31 PROCEDURE — 80053 COMPREHEN METABOLIC PANEL: CPT

## 2025-03-31 PROCEDURE — 96413 CHEMO IV INFUSION 1 HR: CPT

## 2025-03-31 PROCEDURE — 25010000002 HEPARIN LOCK FLUSH PER 10 UNITS: Performed by: INTERNAL MEDICINE

## 2025-03-31 PROCEDURE — 25810000003 SODIUM CHLORIDE 0.9 % SOLUTION: Performed by: PHYSICIAN ASSISTANT

## 2025-03-31 PROCEDURE — 96375 TX/PRO/DX INJ NEW DRUG ADDON: CPT

## 2025-03-31 PROCEDURE — 25810000003 SODIUM CHLORIDE 0.9 % SOLUTION 250 ML FLEX CONT: Performed by: PHYSICIAN ASSISTANT

## 2025-03-31 PROCEDURE — 85025 COMPLETE CBC W/AUTO DIFF WBC: CPT | Performed by: INTERNAL MEDICINE

## 2025-03-31 PROCEDURE — 25010000002 DEXAMETHASONE PER 1 MG: Performed by: PHYSICIAN ASSISTANT

## 2025-03-31 PROCEDURE — 25010000002 ADO-TRASTUZUMAB 100 MG RECONSTITUTED SOLUTION 1 EACH VIAL: Performed by: PHYSICIAN ASSISTANT

## 2025-03-31 RX ORDER — SODIUM CHLORIDE 0.9 % (FLUSH) 0.9 %
10 SYRINGE (ML) INJECTION AS NEEDED
OUTPATIENT
Start: 2025-03-31

## 2025-03-31 RX ORDER — SODIUM CHLORIDE 0.9 % (FLUSH) 0.9 %
10 SYRINGE (ML) INJECTION AS NEEDED
Status: DISCONTINUED | OUTPATIENT
Start: 2025-03-31 | End: 2025-04-01 | Stop reason: HOSPADM

## 2025-03-31 RX ORDER — SODIUM CHLORIDE 9 MG/ML
20 INJECTION, SOLUTION INTRAVENOUS ONCE
Status: COMPLETED | OUTPATIENT
Start: 2025-03-31 | End: 2025-03-31

## 2025-03-31 RX ORDER — SODIUM CHLORIDE 9 MG/ML
20 INJECTION, SOLUTION INTRAVENOUS ONCE
Status: CANCELLED | OUTPATIENT
Start: 2025-03-31

## 2025-03-31 RX ORDER — HEPARIN SODIUM (PORCINE) LOCK FLUSH IV SOLN 100 UNIT/ML 100 UNIT/ML
500 SOLUTION INTRAVENOUS AS NEEDED
OUTPATIENT
Start: 2025-03-31

## 2025-03-31 RX ORDER — DEXAMETHASONE SODIUM PHOSPHATE 4 MG/ML
12 INJECTION, SOLUTION INTRA-ARTICULAR; INTRALESIONAL; INTRAMUSCULAR; INTRAVENOUS; SOFT TISSUE ONCE
Status: COMPLETED | OUTPATIENT
Start: 2025-03-31 | End: 2025-03-31

## 2025-03-31 RX ORDER — HEPARIN SODIUM (PORCINE) LOCK FLUSH IV SOLN 100 UNIT/ML 100 UNIT/ML
500 SOLUTION INTRAVENOUS AS NEEDED
Status: DISCONTINUED | OUTPATIENT
Start: 2025-03-31 | End: 2025-04-01 | Stop reason: HOSPADM

## 2025-03-31 RX ADMIN — DEXAMETHASONE SODIUM PHOSPHATE 12 MG: 4 INJECTION, SOLUTION INTRAMUSCULAR; INTRAVENOUS at 10:36

## 2025-03-31 RX ADMIN — ADO-TRASTUZUMAB EMTANSINE 285 MG: 20 INJECTION, POWDER, LYOPHILIZED, FOR SOLUTION INTRAVENOUS at 11:08

## 2025-03-31 RX ADMIN — Medication 500 UNITS: at 11:42

## 2025-03-31 RX ADMIN — SODIUM CHLORIDE 20 ML/HR: 0.9 INJECTION, SOLUTION INTRAVENOUS at 10:34

## 2025-03-31 RX ADMIN — Medication 10 ML: at 11:42

## 2025-04-08 ENCOUNTER — OFFICE VISIT (OUTPATIENT)
Dept: ONCOLOGY | Facility: CLINIC | Age: 65
End: 2025-04-08
Payer: COMMERCIAL

## 2025-04-08 ENCOUNTER — HOSPITAL ENCOUNTER (OUTPATIENT)
Dept: ONCOLOGY | Facility: HOSPITAL | Age: 65
Discharge: HOME | End: 2025-04-08
Payer: COMMERCIAL

## 2025-04-08 VITALS
RESPIRATION RATE: 18 BRPM | BODY MASS INDEX: 30.22 KG/M2 | OXYGEN SATURATION: 94 % | SYSTOLIC BLOOD PRESSURE: 112 MMHG | WEIGHT: 177 LBS | TEMPERATURE: 98.2 F | HEIGHT: 64 IN | DIASTOLIC BLOOD PRESSURE: 71 MMHG | HEART RATE: 109 BPM

## 2025-04-08 DIAGNOSIS — R91.8 LUNG MASS: Primary | ICD-10-CM

## 2025-04-08 DIAGNOSIS — Z17.0 BILATERAL MALIGNANT NEOPLASM OF BREAST IN FEMALE, ESTROGEN RECEPTOR POSITIVE, UNSPECIFIED SITE OF BREAST: ICD-10-CM

## 2025-04-08 DIAGNOSIS — C50.911 BILATERAL MALIGNANT NEOPLASM OF BREAST IN FEMALE, ESTROGEN RECEPTOR POSITIVE, UNSPECIFIED SITE OF BREAST: Primary | ICD-10-CM

## 2025-04-08 DIAGNOSIS — C50.912 BILATERAL MALIGNANT NEOPLASM OF BREAST IN FEMALE, ESTROGEN RECEPTOR POSITIVE, UNSPECIFIED SITE OF BREAST: Primary | ICD-10-CM

## 2025-04-08 DIAGNOSIS — C50.912 BILATERAL MALIGNANT NEOPLASM OF BREAST IN FEMALE, ESTROGEN RECEPTOR POSITIVE, UNSPECIFIED SITE OF BREAST: ICD-10-CM

## 2025-04-08 DIAGNOSIS — Z17.0 BILATERAL MALIGNANT NEOPLASM OF BREAST IN FEMALE, ESTROGEN RECEPTOR POSITIVE, UNSPECIFIED SITE OF BREAST: Primary | ICD-10-CM

## 2025-04-08 DIAGNOSIS — C50.911 BILATERAL MALIGNANT NEOPLASM OF BREAST IN FEMALE, ESTROGEN RECEPTOR POSITIVE, UNSPECIFIED SITE OF BREAST: ICD-10-CM

## 2025-04-08 DIAGNOSIS — Z45.2 ENCOUNTER FOR CARE RELATED TO VASCULAR ACCESS PORT: ICD-10-CM

## 2025-04-08 LAB
BASOPHILS # BLD AUTO: 0.05 10*3/MM3 (ref 0–0.2)
BASOPHILS NFR BLD AUTO: 0.7 % (ref 0–1.5)
DEPRECATED RDW RBC AUTO: 47.7 FL (ref 37–54)
EOSINOPHIL # BLD AUTO: 0.38 10*3/MM3 (ref 0–0.4)
EOSINOPHIL NFR BLD AUTO: 5.5 % (ref 0.3–6.2)
ERYTHROCYTE [DISTWIDTH] IN BLOOD BY AUTOMATED COUNT: 15.8 % (ref 12.3–15.4)
HCT VFR BLD AUTO: 42.7 % (ref 34–46.6)
HGB BLD-MCNC: 13.8 G/DL (ref 12–15.9)
LYMPHOCYTES # BLD AUTO: 0.99 10*3/MM3 (ref 0.7–3.1)
LYMPHOCYTES NFR BLD AUTO: 14.3 % (ref 19.6–45.3)
MCH RBC QN AUTO: 27.2 PG (ref 26.6–33)
MCHC RBC AUTO-ENTMCNC: 32.3 G/DL (ref 31.5–35.7)
MCV RBC AUTO: 84.2 FL (ref 79–97)
MONOCYTES # BLD AUTO: 0.91 10*3/MM3 (ref 0.1–0.9)
MONOCYTES NFR BLD AUTO: 13.2 % (ref 5–12)
NEUTROPHILS NFR BLD AUTO: 4.59 10*3/MM3 (ref 1.7–7)
NEUTROPHILS NFR BLD AUTO: 66.3 % (ref 42.7–76)
PLATELET # BLD AUTO: 100 10*3/MM3 (ref 140–450)
PMV BLD AUTO: 10 FL (ref 6–12)
RBC # BLD AUTO: 5.07 10*6/MM3 (ref 3.77–5.28)
WBC NRBC COR # BLD AUTO: 6.92 10*3/MM3 (ref 3.4–10.8)

## 2025-04-08 PROCEDURE — 36591 DRAW BLOOD OFF VENOUS DEVICE: CPT

## 2025-04-08 PROCEDURE — 36593 DECLOT VASCULAR DEVICE: CPT

## 2025-04-08 PROCEDURE — 85025 COMPLETE CBC W/AUTO DIFF WBC: CPT | Performed by: INTERNAL MEDICINE

## 2025-04-08 PROCEDURE — 25010000002 HEPARIN LOCK FLUSH PER 10 UNITS: Performed by: INTERNAL MEDICINE

## 2025-04-08 PROCEDURE — 99214 OFFICE O/P EST MOD 30 MIN: CPT | Performed by: INTERNAL MEDICINE

## 2025-04-08 PROCEDURE — 25010000002 ALTEPLASE 2 MG RECONSTITUTED SOLUTION: Performed by: INTERNAL MEDICINE

## 2025-04-08 RX ORDER — SODIUM CHLORIDE 0.9 % (FLUSH) 0.9 %
10 SYRINGE (ML) INJECTION AS NEEDED
Status: DISCONTINUED | OUTPATIENT
Start: 2025-04-08 | End: 2025-04-09 | Stop reason: HOSPADM

## 2025-04-08 RX ORDER — TEMAZEPAM 7.5 MG/1
7.5 CAPSULE ORAL NIGHTLY PRN
Qty: 30 CAPSULE | Refills: 0 | Status: SHIPPED | OUTPATIENT
Start: 2025-04-08

## 2025-04-08 RX ORDER — SODIUM CHLORIDE 0.9 % (FLUSH) 0.9 %
10 SYRINGE (ML) INJECTION AS NEEDED
OUTPATIENT
Start: 2025-04-08

## 2025-04-08 RX ORDER — HEPARIN SODIUM (PORCINE) LOCK FLUSH IV SOLN 100 UNIT/ML 100 UNIT/ML
500 SOLUTION INTRAVENOUS AS NEEDED
Status: DISCONTINUED | OUTPATIENT
Start: 2025-04-08 | End: 2025-04-09 | Stop reason: HOSPADM

## 2025-04-08 RX ORDER — HEPARIN SODIUM (PORCINE) LOCK FLUSH IV SOLN 100 UNIT/ML 100 UNIT/ML
500 SOLUTION INTRAVENOUS AS NEEDED
OUTPATIENT
Start: 2025-04-08

## 2025-04-08 RX ADMIN — ALTEPLASE: 2.2 INJECTION, POWDER, LYOPHILIZED, FOR SOLUTION INTRAVENOUS at 13:19

## 2025-04-08 RX ADMIN — Medication 500 UNITS: at 14:16

## 2025-04-08 RX ADMIN — Medication 10 ML: at 14:16

## 2025-04-08 NOTE — PROGRESS NOTES
Initially no blood return noted. After administration of Activase, able to obtain blood specimen sample.       Port accessed and flushed with good blood return noted. 10cc of blood wasted prior to specimen collection. Blood specimen obtained and sent to lab for processing per protocol.  Port flushed with saline and heparin prior to needle removal.

## 2025-04-08 NOTE — PROGRESS NOTES
HEMATOLOGY ONCOLOGY OUTPATIENT FOLLOW UP       Patient name: Tracy Reddy  : 1960  MRN: [id number]  Primary Care Physician: Provider, No Known  Referring Physician: No ref. provider found  Reason For Consult: breast cancer    History of Present Illness:    Tracy Reddy is a 64 y.o. female who presented to [facility] on 4/3/2024 with complaints of cough.  She initially presented to [facility] ED with complaints of fever up to 103 °F, productive cough with green and brown sputum, shortness of air and chest tightness that has been ongoing for several days.  She reports she has family members that have been sick at home.  She reports she has also had a history of PE in the past.  Labs show troponin was 13 x 2, .8.  CBC unremarkable.  Respiratory panel was negative. Chest x-ray showed masslike opacity within the right lower lobe.  Sputum cultures and blood cultures have been ordered.  She is a non-smoker.  Pulmonology also has been consulted.     24 CT chest angiogram  Impression:  1. No evidence of pulmonary embolism.  2. No acute cardiopulmonary process. There is a mass present within the right lower lobe likely representing malignancy. There is enlarged right hilar adenopathy likely representing local metastatic disease. No previous imaging available for comparison.        24  Hematology/Oncology was consulted.       From record review: Patient has a history of bilateral ductal carcinoma diagnosed in .  Right breast IDC, G1, ER positive, IA positive, HER-2 negative. Left breast IDC, G2, ER positive, IA positive, HER-2 negative. Left SLND revealed 1 of 2 lymph nodes positive for macro metastasis. She is status post bilateral mastectomy with reconstruction in .  She is also completed neoadjuvant chemotherapy with 6 cycles of TCHP.  Taxotere and carboplatin with dose reductions at cycle 2 secondary to chemotherapy-induced nausea and vomiting.  Completed  adjuvant Herceptin Perjeta for 1 year.  Status post radiation therapy left chest wall and regional nodes, 5 fractions for a total dose of 5000 cGray. Current treatment with hormone blockade with Arimidex.  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  She follows at [facility], reviewed most recent office note from 3/11/2024.     4/5/2024 status post bronchoscopy.  EBUS guided FNA of the right lower lobe hilar mass confirmed metastatic ductal carcinoma of the breast ER +100% SC +10%, HER2 nu pending  Respiratory panel positive for human metapneumovirus.    4/12/2024 PET/CT with 1.1 cm lesion in the region of the left vallecula concerning for second primary malignancy.  Recommend correlation with direct visualization, large right hilar mass consistent with malignancy, no evidence of metastatic disease elsewhere    Path with HER 2 positive 3+ by IHC, guardant NGS with PIK3CA     5/13/24 - started THP  6/3/24 - C2  6/24/24 - C3  7/15/2024: Patient received cycle 4 chemotherapy with Taxotere Herceptin Perjeta  8/5/2024: Patient received cycle 5 Taxotere Herceptin Perjeta      7/12/24 - CT imaging - There is decrease in size of the right hilar mass compared to the prior examination. This now measures approximately 3.0 x 1.5 cm, previously measured 4.2 x 3.4 cm using similar measurement technique.     -9/6/2024 patient had a PET CT scan there is a dramatic interval decrease in the size of the right lower lobe infrahilar mass when compared to previous PET scan.  There is no longer any visible mass or abnormal metabolic activity in this location.  There may be residual scarring in that location.  There is an uptake within the right paratracheal lymph node measuring 1.4 cm SUV 5.6 suggesting metastatic disease previously measured 0.8 cm.  No additional areas of abnormal metabolic activity was seen.  No other evidence of metastatic disease was seen    Palliative XRT today right lung mass ongoing under the  care of Dr. Wilburn  3/7/2025: Patient was initiated on single agent Kadcyla  3/31/2025: Patient received cycle 2 of Kadcyla     Subjective:    She is tolerating Kadcyla well.  She has some mild fatigue otherwise remains functional.  Also has some problems with constipation      Past Medical History:   Diagnosis Date    Cancer        Past Surgical History:   Procedure Laterality Date    BRONCHOSCOPY N/A 4/5/2024    Procedure: BRONCHOSCOPY WITH ENDOBRONCHIAL ULTRASOUND, bilateral bronchial washings, fine needle aspiration x 2 areas;  Surgeon: Edy Irving MD;  Location: [facility] ENDOSCOPY;  Service: Pulmonary;  Laterality: N/A;  post: lung mass    MASTECTOMY           Current Outpatient Medications:     Calcium Carb-Cholecalciferol (Calcium + Vitamin D3) 500-5 MG-MCG tablet per tablet, Take 1 tablet by mouth 2 (Two) Times a Day., Disp: 60 tablet, Rfl: 6    Calcium Citrate-Vitamin D (Citrus Calcium/Vitamin D) 200-6.25 MG-MCG tablet, Take  by mouth., Disp: , Rfl:     cetirizine (zyrTEC) 10 MG tablet, Take 1 tablet by mouth Daily., Disp: , Rfl:     letrozole (FEMARA) 2.5 MG tablet, Take 1 tablet by mouth Daily., Disp: 30 tablet, Rfl: 6    ondansetron (ZOFRAN) 8 MG tablet, Take 1 tablet by mouth 3 (Three) Times a Day As Needed for Nausea or Vomiting., Disp: 30 tablet, Rfl: 5    omeprazole (priLOSEC) 20 MG capsule, Take 1 capsule by mouth Daily. (Patient not taking: Reported on 4/8/2025), Disp: 30 capsule, Rfl: 5    temazepam (RESTORIL) 7.5 MG capsule, Take 1 capsule by mouth At Night As Needed for Sleep., Disp: 30 capsule, Rfl: 0  No current facility-administered medications for this visit.    Facility-Administered Medications Ordered in Other Visits:     alteplase (CATHFLO/ACTIVASE) injection 2 mg, 2 mg, Intracatheter, Q2H PRN, Cadence Nazario MD, New Syringe/Cartridge at 04/08/25 1319    heparin injection 500 Units, 500 Units, Intravenous, PRN, Cadence Nazario MD, 500 Units at 04/08/25 1416    sodium  "chloride 0.9 % flush 10 mL, 10 mL, Intravenous, PRN, Cadence Nazario MD, 10 mL at 04/08/25 1416    Allergies   Allergen Reactions    Penicillins Rash       No family history on file.    Cancer-related family history is not on file.    Social History     Tobacco Use    Smoking status: Never     Passive exposure: Never    Smokeless tobacco: Never   Vaping Use    Vaping status: Never Used   Substance Use Topics    Alcohol use: Never    Drug use: Never     Social History     Social History Narrative    Not on file      ROS:   Review of Systems   Constitutional:  Negative for chills, fatigue and fever.   HENT:  Negative for congestion and nosebleeds.    Eyes:  Negative for pain and itching.   Respiratory:  Negative for cough and shortness of breath.    Cardiovascular:  Negative for chest pain.   Gastrointestinal:  Negative for abdominal pain, blood in stool, diarrhea, nausea and vomiting.   Endocrine: Negative for cold intolerance and heat intolerance.   Genitourinary:  Negative for difficulty urinating.   Musculoskeletal:  Negative for arthralgias.   Skin:  Negative for rash.   Neurological:  Negative for dizziness and headaches.   Hematological:  Does not bruise/bleed easily.   Psychiatric/Behavioral:  Negative for behavioral problems.        Objective:  Vital signs:  Vitals:    04/08/25 1324   BP: 112/71   Pulse: 109   Resp: 18   Temp: 98.2 °F (36.8 °C)   TempSrc: Temporal   SpO2: 94%   Weight: 80.3 kg (177 lb)   Height: 162.6 cm (64\")   PainSc: 0-No pain         Body mass index is 30.38 kg/m².  ECOG  (0) Fully active, able to carry on all predisease performance without restriction    Physical Exam:   Physical Exam  Constitutional:       Appearance: Normal appearance.   HENT:      Head: Normocephalic and atraumatic.   Eyes:      Pupils: Pupils are equal, round, and reactive to light.   Cardiovascular:      Rate and Rhythm: Normal rate and regular rhythm.      Pulses: Normal pulses.      Heart sounds: Normal " "heart sounds. No murmur heard.  Pulmonary:      Effort: Pulmonary effort is normal.      Breath sounds: Normal breath sounds.   Abdominal:      General: There is no distension.      Palpations: Abdomen is soft. There is no mass.      Tenderness: There is no abdominal tenderness.   Musculoskeletal:         General: Normal range of motion.      Cervical back: Normal range of motion and neck supple.   Skin:     General: Skin is warm.   Neurological:      General: No focal deficit present.      Mental Status: She is alert.   Psychiatric:         Mood and Affect: Mood normal.           Lab Results - Last 18 Months   Lab Units 04/08/25  1416 03/31/25  0850 03/06/25  0923   WBC 10*3/mm3 6.92 5.68 6.21   HEMOGLOBIN g/dL 13.8 13.3 13.6   HEMATOCRIT % 42.7 39.6 44.5   PLATELETS 10*3/mm3 100* 219 204   MCV fL 84.2 82.2 88.1     Lab Results - Last 18 Months   Lab Units 03/31/25  0907 03/06/25  0923 01/14/25  1144 12/17/24  1216 11/26/24  0938   SODIUM mmol/L  --  141 141  --  140   POTASSIUM mmol/L  --  3.8 4.3  --  4.0   CHLORIDE mmol/L  --  104 107  --  106   CO2 mmol/L  --  23.7 24.6  --  22.8   BUN mg/dL  --  19 26*  --  20   CREATININE mg/dL 1.00 1.00 1.00   < > 0.90   CALCIUM mg/dL  --  9.5 9.4  --  9.3   BILIRUBIN mg/dL  --  0.6 0.5  --  0.5   ALK PHOS U/L  --  91 88  --  88   ALT (SGPT) U/L  --  11 6  --  <5   AST (SGOT) U/L  --  26 20  --  19   GLUCOSE mg/dL  --  100* 103*  --  108*    < > = values in this interval not displayed.       Lab Results   Component Value Date    GLUCOSE 100 (H) 03/06/2025    BUN 19 03/06/2025    CREATININE 1.00 03/31/2025    EGFRIFNONA 57 (L) 11/10/2021    EGFRIFAFRI 66 11/10/2021    BCR 19.0 03/06/2025    K 3.8 03/06/2025    CO2 23.7 03/06/2025    CALCIUM 9.5 03/06/2025    ALBUMIN 4.0 03/06/2025    AST 26 03/06/2025    ALT 11 03/06/2025       Lab Results - Last 18 Months   Lab Units 04/05/24  0415   INR  0.99       No results found for: \"IRON\", \"TIBC\", \"FERRITIN\"    No results found for: " "\"FOLATE\"    No results found for: \"OCCULTBLD\"    No results found for: \"RETICCTPCT\"  No results found for: \"XHJOQGXH00\"  No results found for: \"SPEP\", \"UPEP\"  No results found for: \"LDH\", \"URICACID\"  No results found for: \"BABAK\", \"RF\", \"SEDRATE\"  No results found for: \"FIBRINOGEN\", \"HAPTOGLOBIN\"  Lab Results   Component Value Date    INR 0.99 04/05/2024     No results found for: \"\"  No results found for: \"CEA\"  No components found for: \"CA-19-9\"  No results found for: \"PSA\"  No results found for: \"SEDRATE\"      Assessment & Plan     Tracy Reddy is a 63 y.o. female with history of pulmonary embolism and bilateral invasive ductal carcinoma status post bilateral mastectomy, chemotherapy and radiation treatments, on Arimidex.  She presented with dyspnea and was found to have a lung mass in the right lower lobe with right hilar and suprahilar lymphadenopathy.     Right lung mass-metastatic ductal carcinoma of the breast -recurrent, ER/AZ positive HER2/nancy 3+ on immunohistochemistry    CT imaging showed mass present within the medial right lower lobe measuring up to 3.2 x 4.1 cm that extends to the right hilar region.  Additional conglomerate of nodes present within right hilar to right suprahilar region measuring up to 2.7 x 1.8 cm.  Status post bronchoscopy EBUS 4/5/2024.    Biopsy confirms metastatic ductal carcinoma of the breast ER/AZ positive, HER2 nu pending.  Further treatment will depend on HER2/nancy status.  Will also send tumor sample out for [facility] NGS testing.  She is on Arimidex right now but will need to switch to an alternate AI or a different agent given mutational analysis findings.  If HER2 positive disease, will use HER2 blockade.  Given there is no other clear areas of metastasis.  We can still consider curative treatment with either radio-ablation or surgical resection.    Discussed at tumor board. Not a surgical candidate based on tumor location. Radiation possible. Will plan on starting " chemo with herceptin perjeta and then consider radiation after 6 cycles.    NGS testing with guardant showing PIK3CA.  See report    Continue herceptin perjeta maintenance    Started treatment tolerating well has some side effects as above not significant to need any dose modification  CT imaging now with good response. Reviewed images independently and showed the patient. Reviewed labs, and notes from other providers.ordered labs    Our plan would be to continue 3 more cycles of Taxotere, Herceptin and Perjeta and then discuss at tumor board again about consolidation with surgery vs radiation. Initial thought was that radiation would be preferable given location.  Continue treatment no changes.    She has completed planned 6 cycles of Taxotere, Herceptin and Perjeta    Resume AI after chemotherapy can start letrozole at that point.    She is currently on letrozole 2.5 mg p.o. daily as well as continue Herceptin Perjeta IV  Has been with vitamin D twice a day      Refer to Dr. Armenta for 1.4 cm right paratracheal node: Surgery not recommended at this time.  Patient receiving XRT to right lung.  After completed we will plan to transition to Kadcyla for maintenance.        She is now receiving Kadcyla initiated on 3/1/2025    Continue the same      Insomnia  - complains more with steroids  Takes melatonin does not work effectively therefore we will begin Restoril 7.5 mg nightly as needed for insomnia        History of bilateral invasive ductal carcinoma    -Diagnosed in 2020. She is status post bilateral mastectomy with reconstruction in 2021.  She is also completed neoadjuvant chemotherapy with 6 cycles of TCHP.  Taxotere and carboplatin with dose reductions at cycle 2 secondary to chemotherapy-induced nausea and vomiting.  There is some confusion about her pathology as some reports show HER2 negative, some show positive.  However she did receive adjuvant Herceptin Perjeta maintenance which likely points towards HER2  positive disease.  She is also known to have CHEK2 mutation.  Completed adjuvant Herceptin Perjeta for 1 year.  Status post radiation therapy left chest wall and regional nodes, 5 fractions for a total dose of 5000 cGray. Current treatment with hormone blockade with Arimidex. She followed at [facility] has transferred care to us now         Pneumonia  This has resolved    Osteopenia  DEXA with osteopenia. continue calcium vitamin D.  Continue    Rash  Likely herceptin related  Lasted for a week   Recommend hydrocortisone topical   Resolved    Neutropenia due to chemotherapy    Neutropenic precautions  Add Neulasta with cycle #6  Resolved    Diarrhea   Mild      Needs 2 D echo q 3 months for her 2 directed therapy.  2D echo will be due 5/25/2025        Continue treatment  On chemotherapy requiring intensive monitoring.  Follow-up in 3 weeks    Reviewed PET scan with patient in detail          Follow-up 4 weeks        Electronically signed by Cadence Nazario MD, 04/08/25, 5:06 PM EDT.

## 2025-04-21 ENCOUNTER — HOSPITAL ENCOUNTER (OUTPATIENT)
Dept: ONCOLOGY | Facility: HOSPITAL | Age: 65
Discharge: HOME | End: 2025-04-21
Admitting: INTERNAL MEDICINE
Payer: COMMERCIAL

## 2025-04-21 VITALS
WEIGHT: 180.2 LBS | TEMPERATURE: 97.8 F | BODY MASS INDEX: 30.77 KG/M2 | SYSTOLIC BLOOD PRESSURE: 114 MMHG | HEART RATE: 108 BPM | RESPIRATION RATE: 14 BRPM | DIASTOLIC BLOOD PRESSURE: 55 MMHG | OXYGEN SATURATION: 92 % | HEIGHT: 64 IN

## 2025-04-21 DIAGNOSIS — Z17.0 BILATERAL MALIGNANT NEOPLASM OF BREAST IN FEMALE, ESTROGEN RECEPTOR POSITIVE, UNSPECIFIED SITE OF BREAST: Primary | ICD-10-CM

## 2025-04-21 DIAGNOSIS — R91.8 LUNG MASS: ICD-10-CM

## 2025-04-21 DIAGNOSIS — C50.912 BILATERAL MALIGNANT NEOPLASM OF BREAST IN FEMALE, ESTROGEN RECEPTOR POSITIVE, UNSPECIFIED SITE OF BREAST: Primary | ICD-10-CM

## 2025-04-21 DIAGNOSIS — C50.911 BILATERAL MALIGNANT NEOPLASM OF BREAST IN FEMALE, ESTROGEN RECEPTOR POSITIVE, UNSPECIFIED SITE OF BREAST: Primary | ICD-10-CM

## 2025-04-21 LAB
ALP BLD-CCNC: 71 U/L (ref 42–141)
BASOPHILS # BLD AUTO: 0.03 10*3/MM3 (ref 0–0.2)
BASOPHILS NFR BLD AUTO: 0.4 % (ref 0–1.5)
BUN BLDA-MCNC: 19 MG/DL (ref 7–22)
CALCIUM BLD QL: 9.8 MG/DL (ref 8–10.3)
CHLORIDE BLDA-SCNC: 110 MMOL/L (ref 98–108)
CO2 BLDA-SCNC: 27 MMOL/L (ref 18–33)
CREAT BLDA-MCNC: 1 MG/DL (ref 0.6–1.2)
DEPRECATED RDW RBC AUTO: 48.7 FL (ref 37–54)
EGFRCR SERPLBLD CKD-EPI 2021: 63.4 ML/MIN/1.73
EOSINOPHIL # BLD AUTO: 0.54 10*3/MM3 (ref 0–0.4)
EOSINOPHIL NFR BLD AUTO: 7.8 % (ref 0.3–6.2)
ERYTHROCYTE [DISTWIDTH] IN BLOOD BY AUTOMATED COUNT: 16.1 % (ref 12.3–15.4)
GLUCOSE BLDC GLUCOMTR-MCNC: 104 MG/DL (ref 73–118)
HCT VFR BLD AUTO: 40.1 % (ref 34–46.6)
HGB BLD-MCNC: 12.7 G/DL (ref 12–15.9)
LYMPHOCYTES # BLD AUTO: 0.83 10*3/MM3 (ref 0.7–3.1)
LYMPHOCYTES NFR BLD AUTO: 12 % (ref 19.6–45.3)
MCH RBC QN AUTO: 26.8 PG (ref 26.6–33)
MCHC RBC AUTO-ENTMCNC: 31.7 G/DL (ref 31.5–35.7)
MCV RBC AUTO: 84.8 FL (ref 79–97)
MONOCYTES # BLD AUTO: 0.64 10*3/MM3 (ref 0.1–0.9)
MONOCYTES NFR BLD AUTO: 9.3 % (ref 5–12)
NEUTROPHILS NFR BLD AUTO: 4.86 10*3/MM3 (ref 1.7–7)
NEUTROPHILS NFR BLD AUTO: 70.5 % (ref 42.7–76)
PLATELET # BLD AUTO: 197 10*3/MM3 (ref 140–450)
PMV BLD AUTO: 9.2 FL (ref 6–12)
POC ALBUMIN: 3.5 G/L (ref 3.3–5.5)
POC ALT (SGPT): 23 U/L (ref 10–47)
POC AST (SGOT): 34 U/L (ref 11–38)
POC TOTAL BILIRUBIN: 0.8 MG/DL (ref 0.2–1.6)
POC TOTAL PROTEIN: 6.7 G/DL (ref 6.4–8.1)
POTASSIUM BLDA-SCNC: 4.5 MMOL/L (ref 3.6–5.1)
RBC # BLD AUTO: 4.73 10*6/MM3 (ref 3.77–5.28)
SODIUM BLD-SCNC: 143 MMOL/L (ref 128–145)
WBC NRBC COR # BLD AUTO: 6.9 10*3/MM3 (ref 3.4–10.8)

## 2025-04-21 PROCEDURE — 80053 COMPREHEN METABOLIC PANEL: CPT

## 2025-04-21 PROCEDURE — 25810000003 SODIUM CHLORIDE 0.9 % SOLUTION 250 ML FLEX CONT: Performed by: NURSE PRACTITIONER

## 2025-04-21 PROCEDURE — 25010000002 DEXAMETHASONE PER 1 MG: Performed by: NURSE PRACTITIONER

## 2025-04-21 PROCEDURE — 25010000002 ADO-TRASTUZUMAB 100 MG RECONSTITUTED SOLUTION 1 EACH VIAL: Performed by: NURSE PRACTITIONER

## 2025-04-21 PROCEDURE — 85025 COMPLETE CBC W/AUTO DIFF WBC: CPT | Performed by: INTERNAL MEDICINE

## 2025-04-21 PROCEDURE — 96375 TX/PRO/DX INJ NEW DRUG ADDON: CPT

## 2025-04-21 PROCEDURE — 25810000003 SODIUM CHLORIDE 0.9 % SOLUTION: Performed by: NURSE PRACTITIONER

## 2025-04-21 PROCEDURE — 25010000002 HEPARIN LOCK FLUSH PER 10 UNITS: Performed by: INTERNAL MEDICINE

## 2025-04-21 PROCEDURE — 96413 CHEMO IV INFUSION 1 HR: CPT

## 2025-04-21 RX ORDER — SODIUM CHLORIDE 9 MG/ML
20 INJECTION, SOLUTION INTRAVENOUS ONCE
Status: CANCELLED | OUTPATIENT
Start: 2025-04-21

## 2025-04-21 RX ORDER — DEXAMETHASONE SODIUM PHOSPHATE 4 MG/ML
12 INJECTION, SOLUTION INTRA-ARTICULAR; INTRALESIONAL; INTRAMUSCULAR; INTRAVENOUS; SOFT TISSUE ONCE
Status: COMPLETED | OUTPATIENT
Start: 2025-04-21 | End: 2025-04-21

## 2025-04-21 RX ORDER — HEPARIN SODIUM (PORCINE) LOCK FLUSH IV SOLN 100 UNIT/ML 100 UNIT/ML
500 SOLUTION INTRAVENOUS AS NEEDED
Status: DISCONTINUED | OUTPATIENT
Start: 2025-04-21 | End: 2025-04-22 | Stop reason: HOSPADM

## 2025-04-21 RX ORDER — SODIUM CHLORIDE 9 MG/ML
20 INJECTION, SOLUTION INTRAVENOUS ONCE
Status: COMPLETED | OUTPATIENT
Start: 2025-04-21 | End: 2025-04-21

## 2025-04-21 RX ORDER — SODIUM CHLORIDE 0.9 % (FLUSH) 0.9 %
10 SYRINGE (ML) INJECTION AS NEEDED
OUTPATIENT
Start: 2025-04-21

## 2025-04-21 RX ORDER — SODIUM CHLORIDE 0.9 % (FLUSH) 0.9 %
10 SYRINGE (ML) INJECTION AS NEEDED
Status: DISCONTINUED | OUTPATIENT
Start: 2025-04-21 | End: 2025-04-22 | Stop reason: HOSPADM

## 2025-04-21 RX ORDER — HEPARIN SODIUM (PORCINE) LOCK FLUSH IV SOLN 100 UNIT/ML 100 UNIT/ML
500 SOLUTION INTRAVENOUS AS NEEDED
OUTPATIENT
Start: 2025-04-21

## 2025-04-21 RX ADMIN — HEPARIN 500 UNITS: 100 SYRINGE at 11:41

## 2025-04-21 RX ADMIN — SODIUM CHLORIDE 20 ML/HR: 9 INJECTION, SOLUTION INTRAVENOUS at 10:28

## 2025-04-21 RX ADMIN — ADO-TRASTUZUMAB EMTANSINE 290 MG: 20 INJECTION, POWDER, LYOPHILIZED, FOR SOLUTION INTRAVENOUS at 11:04

## 2025-04-21 RX ADMIN — DEXAMETHASONE SODIUM PHOSPHATE 12 MG: 4 INJECTION INTRA-ARTICULAR; INTRALESIONAL; INTRAMUSCULAR; INTRAVENOUS; SOFT TISSUE at 10:29

## 2025-04-21 RX ADMIN — Medication 10 ML: at 11:41

## 2025-04-23 RX ORDER — LETROZOLE 2.5 MG/1
2.5 TABLET, FILM COATED ORAL DAILY
Qty: 90 TABLET | Refills: 2 | Status: SHIPPED | OUTPATIENT
Start: 2025-04-23

## 2025-04-29 ENCOUNTER — HOSPITAL ENCOUNTER (OUTPATIENT)
Dept: PET IMAGING | Facility: HOSPITAL | Age: 65
Discharge: HOME | End: 2025-04-29
Admitting: INTERNAL MEDICINE
Payer: COMMERCIAL

## 2025-04-29 DIAGNOSIS — C50.911 CARCINOMA OF RIGHT BREAST METASTATIC TO LUNG: ICD-10-CM

## 2025-04-29 DIAGNOSIS — C78.00 CARCINOMA OF RIGHT BREAST METASTATIC TO LUNG: ICD-10-CM

## 2025-04-29 PROCEDURE — 74177 CT ABD & PELVIS W/CONTRAST: CPT

## 2025-04-29 PROCEDURE — 25510000001 IOPAMIDOL PER 1 ML: Performed by: INTERNAL MEDICINE

## 2025-04-29 PROCEDURE — 71260 CT THORAX DX C+: CPT

## 2025-04-29 RX ORDER — IOPAMIDOL 755 MG/ML
100 INJECTION, SOLUTION INTRAVASCULAR
Status: COMPLETED | OUTPATIENT
Start: 2025-04-29 | End: 2025-04-29

## 2025-04-29 RX ADMIN — IOPAMIDOL 100 ML: 755 INJECTION, SOLUTION INTRAVENOUS at 09:24

## 2025-05-05 NOTE — PROGRESS NOTES
[facility] RADIATION ONCOLOGY  FOLLOW-UP    Name: Tracy Reddy  YOB: 1960  MRN #: [id number]  Date of Service: 5/6/2025    Chief Complaint:  3 month routine follow up with interval imaging review    Diagnosis:   Encounter Diagnoses   Name Primary?    Carcinoma of right breast metastatic to lung Yes    Invasive ductal carcinoma of breast, female, right     Breast cancer metastasized to intrathoracic lymph node, right           Radiation Treatment Course       Treating Physician: Dr. Srinivas Wilburn     Start: 10/2/2024     End: 10/29/2024   Site: Rt lung    Total Dose: 5000 cGy    Dose/Fraction: 250 cGy    Total Fractions: 20 Daily or BID: Daily  Modality: Photon  Technique: IMRT/VMAT/Rapid Arc  Bolus: No           Interval History       Tracy Reddy is a 64 y.o. 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 She underwent neoadjuvant chemotherapy with 6 cycles TCHP, followed by bilateral mastectomy with implant reconstruction, adjuvant PHESGO and Arimidex, and adjuvant left chest wall and regional lymph node radiation.       More recently, she was diagnosed with metastatic breast cancer in the right lower lobe and subcarinal regions. She completed radiation therapy to right lung lesion and intrathoracic lymph nodes on 10/29/2024. She returns today for interval imaging follow-up.    Patient reports prior breathing issues over the winter but they have been improving with no major complaints at this time.            Imaging   CT Chest/Abdomen/Pelvis With Contrast Diagnostic  Result Date: 5/2/2025  Impression: 1.Increased consolidation within the right infrahilar region with associated air bronchograms and no distinct mass. This is again compatible with treatment related change. 2.No new suspicious pulmonary nodule or mass. No new suspicious mediastinal mass or lymphadenopathy. 3.Unchanged scarring in the left upper lobe and  tree-in-bud nodularity in the right middle lobe. 4.No evidence of metastatic disease in the abdomen or pelvis      Tissue Pathology exam:  No new pathology to review.        Labs       Hematology:  WBC   Date Value Ref Range Status   04/21/2025 6.90 3.40 - 10.80 10*3/mm3 Final   10/24/2023 7.0 3.7 - 10.3 x10(3)/mcL Final     RBC   Date Value Ref Range Status   04/21/2025 4.73 3.77 - 5.28 10*6/mm3 Final   10/24/2023 4.75 3.90 - 5.20 x10(6)/mcL Final     Hemoglobin   Date Value Ref Range Status   04/21/2025 12.7 12.0 - 15.9 g/dL Final   10/24/2023 13.7 11.2 - 15.7 g/dL Final     Hematocrit   Date Value Ref Range Status   04/21/2025 40.1 34.0 - 46.6 % Final   10/24/2023 41.7 34.0 - 45.0 % Final     Platelets   Date Value Ref Range Status   04/21/2025 197 140 - 450 10*3/mm3 Final   10/24/2023 199 155 - 369 x10(3)/mcL Final     Chemistry:  Lab Results   Component Value Date    GLUCOSE 100 (H) 03/06/2025    BUN 19 03/06/2025    CREATININE 1.00 04/21/2025    EGFRIFNONA 57 (L) 11/10/2021    EGFRIFAFRI 66 11/10/2021    BCR 19.0 03/06/2025    K 3.8 03/06/2025    CO2 23.7 03/06/2025    CALCIUM 9.5 03/06/2025    ALBUMIN 4.0 03/06/2025    AST 26 03/06/2025    ALT 11 03/06/2025         Problem List       Patient Active Problem List   Diagnosis    Abnormal CT of the chest    History of breast cancer    Dyspnea    Fever    Lung mass    Upper respiratory infection, acute    Aromatase inhibitor use    Bilateral malignant neoplasm of breast in female    Encounter for ongoing osteoporosis bisphosphonate therapy    Menopause    Monoallelic mutation of CHEK2 gene in female patient    Low bone density in premenopausal patient    Invasive ductal carcinoma of breast, female, right    Invasive ductal carcinoma of breast, female, left    Breast cancer metastasized to lung    Dehydration    Encounter for care related to vascular access port    Breast cancer metastasized to intrathoracic lymph node, right          Current Medications        Current Outpatient Medications   Medication Instructions    Calcium Carb-Cholecalciferol (Calcium + Vitamin D3) 500-5 MG-MCG tablet per tablet 1 tablet, Oral, 2 Times Daily    Calcium Citrate-Vitamin D (Citrus Calcium/Vitamin D) 200-6.25 MG-MCG tablet Take  by mouth.    cetirizine (ZYRTEC) 10 mg, Daily    letrozole (FEMARA) 2.5 mg, Oral, Daily    omeprazole (PRILOSEC) 20 mg, Oral, Daily    ondansetron (ZOFRAN) 8 mg, Oral, 3 Times Daily PRN    temazepam (RESTORIL) 7.5 mg, Oral, Nightly PRN          Allergies       Allergies   Allergen Reactions    Penicillins Rash           Review of Systems       Vitals:    05/06/25 0909   BP: 102/64   Pulse: 109   Resp: 18   SpO2: 95%      Physical Exam  Constitutional:       General: She is not in acute distress.  HENT:      Head: Normocephalic and atraumatic.   Pulmonary:      Effort: Pulmonary effort is normal. No respiratory distress.   Neurological:      Mental Status: She is alert and oriented to person, place, and time. Mental status is at baseline.   Psychiatric:         Mood and Affect: Mood normal.         Behavior: Behavior normal.              ECOG:  Fully active, able to carry on all pre-disease performance without restriction = 0        Assessment and Plan       Assessment:          Tracy Reddy is a 64 y.o. 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 She underwent neoadjuvant chemotherapy with 6 cycles TCHP, followed by bilateral mastectomy with implant reconstruction, adjuvant PHESGO and Arimidex, and adjuvant left chest wall and regional lymph node radiation.       More recently, she was diagnosed with metastatic breast cancer in the right lower lobe and subcarinal regions. She completed radiation therapy to right lung lesion and intrathoracic lymph nodes on 10/29/2024. She returns today for interval imaging follow-up.       Diagnoses and all orders for this visit:    1. Carcinoma of right breast  metastatic to lung (Primary)  -     CT Chest With Contrast Diagnostic; Future  -     CT Abdomen Pelvis With Contrast; Future    2. Invasive ductal carcinoma of breast, female, right    3. Breast cancer metastasized to intrathoracic lymph node, right       Plan:      Orders:  - Repeat CT CAP in 3 months  - Follow-up after imaging or sooner as clinically indicated.    Patient symptomatically doing well with no complaints. No evidence of disease recurrence or progression on recent imaging. Discussed plans for continued follow-up with repeat scans in 3 months.     I spent 20 minutes caring for Tracy on this date of service. This time includes time spent by me in the following activities:preparing for the visit, reviewing tests, obtaining and/or reviewing a separately obtained history, performing a medically appropriate examination and/or evaluation , counseling and educating the patient/family/caregiver, ordering medications, tests, or procedures, documenting information in the medical record, and independently interpreting results and communicating that information with the patient/family/caregiver        Follow-Up       No follow-ups on file.       CC: No ref. provider found

## 2025-05-05 NOTE — PROGRESS NOTES
HEMATOLOGY ONCOLOGY OUTPATIENT FOLLOW UP       Patient name: Tracy Reddy  : 1960  MRN: [id number]  Primary Care Physician: Provider, No Known  Referring Physician: No ref. provider found  Reason For Consult: breast cancer    History of Present Illness:    Tracy Reddy is a 64 y.o. female who presented to [facility] on 4/3/2024 with complaints of cough.  She initially presented to [facility] ED with complaints of fever up to 103 °F, productive cough with green and brown sputum, shortness of air and chest tightness that has been ongoing for several days.  She reports she has family members that have been sick at home.  She reports she has also had a history of PE in the past.  Labs show troponin was 13 x 2, .8.  CBC unremarkable.  Respiratory panel was negative. Chest x-ray showed masslike opacity within the right lower lobe.  Sputum cultures and blood cultures have been ordered.  She is a non-smoker.  Pulmonology also has been consulted.     24 CT chest angiogram  Impression:  1. No evidence of pulmonary embolism.  2. No acute cardiopulmonary process. There is a mass present within the right lower lobe likely representing malignancy. There is enlarged right hilar adenopathy likely representing local metastatic disease. No previous imaging available for comparison.        24  Hematology/Oncology was consulted.       From record review: Patient has a history of bilateral ductal carcinoma diagnosed in .  Right breast IDC, G1, ER positive, NC positive, HER-2 negative. Left breast IDC, G2, ER positive, NC positive, HER-2 negative. Left SLND revealed 1 of 2 lymph nodes positive for macro metastasis. She is status post bilateral mastectomy with reconstruction in .  She is also completed neoadjuvant chemotherapy with 6 cycles of TCHP.  Taxotere and carboplatin with dose reductions at cycle 2 secondary to chemotherapy-induced nausea and vomiting.  Completed  adjuvant Herceptin Perjeta for 1 year.  Status post radiation therapy left chest wall and regional nodes, 5 fractions for a total dose of 5000 cGray. Current treatment with hormone blockade with Arimidex.  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  She follows at [facility], reviewed most recent office note from 3/11/2024.     4/5/2024 status post bronchoscopy.  EBUS guided FNA of the right lower lobe hilar mass confirmed metastatic ductal carcinoma of the breast ER +100% NM +10%, HER2 nu pending  Respiratory panel positive for human metapneumovirus.    4/12/2024 PET/CT with 1.1 cm lesion in the region of the left vallecula concerning for second primary malignancy.  Recommend correlation with direct visualization, large right hilar mass consistent with malignancy, no evidence of metastatic disease elsewhere    Path with HER 2 positive 3+ by IHC, guardant NGS with PIK3CA     5/13/24 - started THP  6/3/24 - C2  6/24/24 - C3  7/15/2024: Patient received cycle 4 chemotherapy with Taxotere Herceptin Perjeta  8/5/2024: Patient received cycle 5 Taxotere Herceptin Perjeta      7/12/24 - CT imaging - There is decrease in size of the right hilar mass compared to the prior examination. This now measures approximately 3.0 x 1.5 cm, previously measured 4.2 x 3.4 cm using similar measurement technique.     -9/6/2024 patient had a PET CT scan there is a dramatic interval decrease in the size of the right lower lobe infrahilar mass when compared to previous PET scan.  There is no longer any visible mass or abnormal metabolic activity in this location.  There may be residual scarring in that location.  There is an uptake within the right paratracheal lymph node measuring 1.4 cm SUV 5.6 suggesting metastatic disease previously measured 0.8 cm.  No additional areas of abnormal metabolic activity was seen.  No other evidence of metastatic disease was seen    Palliative XRT today right lung mass ongoing under the  care of Dr. Wilburn  3/7/2025: Patient was initiated on single agent Kadcyla  3/31/2025: Patient received cycle 2 of Kadcyla  4/21/2025: Patient received cycle 3 of Kadcyla       I have reviewed and confirmed the accuracy of the patient's history: Chief complaint, HPI, ROS, and Subjective as entered by the MA/LPN/RN. Cadencechacho Nazario MD 05/06/25        Subjective:    She denies any new issues.  Energy level has improved also cough has resolved.  Overall she is tolerating treatments fairly well      Past Medical History:   Diagnosis Date    Cancer        Past Surgical History:   Procedure Laterality Date    BRONCHOSCOPY N/A 4/5/2024    Procedure: BRONCHOSCOPY WITH ENDOBRONCHIAL ULTRASOUND, bilateral bronchial washings, fine needle aspiration x 2 areas;  Surgeon: Edy Irving MD;  Location: [facility] ENDOSCOPY;  Service: Pulmonary;  Laterality: N/A;  post: lung mass    MASTECTOMY           Current Outpatient Medications:     Calcium Carb-Cholecalciferol (Calcium + Vitamin D3) 500-5 MG-MCG tablet per tablet, Take 1 tablet by mouth 2 (Two) Times a Day., Disp: 60 tablet, Rfl: 6    Calcium Citrate-Vitamin D (Citrus Calcium/Vitamin D) 200-6.25 MG-MCG tablet, Take  by mouth., Disp: , Rfl:     cetirizine (zyrTEC) 10 MG tablet, Take 1 tablet by mouth Daily., Disp: , Rfl:     letrozole (FEMARA) 2.5 MG tablet, TAKE 1 TABLET BY MOUTH EVERY DAY, Disp: 90 tablet, Rfl: 2    omeprazole (priLOSEC) 20 MG capsule, Take 1 capsule by mouth Daily. (Patient not taking: Reported on 5/6/2025), Disp: 30 capsule, Rfl: 5    ondansetron (ZOFRAN) 8 MG tablet, Take 1 tablet by mouth 3 (Three) Times a Day As Needed for Nausea or Vomiting., Disp: 30 tablet, Rfl: 5    temazepam (RESTORIL) 7.5 MG capsule, Take 1 capsule by mouth At Night As Needed for Sleep., Disp: 30 capsule, Rfl: 0  No current facility-administered medications for this visit.    Facility-Administered Medications Ordered in Other Visits:     heparin injection 500 Units, 500 Units,  "Intravenous, PRN, Cadence Nazario MD, 500 Units at 05/06/25 1259    sodium chloride 0.9 % flush 10 mL, 10 mL, Intravenous, PRNAravind Ifeoma Roseline, MD, 10 mL at 05/06/25 1259    Allergies   Allergen Reactions    Penicillins Rash       No family history on file.    Cancer-related family history is not on file.    Social History     Tobacco Use    Smoking status: Never     Passive exposure: Never    Smokeless tobacco: Never   Vaping Use    Vaping status: Never Used   Substance Use Topics    Alcohol use: Never    Drug use: Never     Social History     Social History Narrative    Not on file      ROS:   Review of Systems   Constitutional:  Negative for chills, fatigue and fever.   HENT:  Negative for congestion and nosebleeds.    Eyes:  Negative for pain and itching.   Respiratory:  Negative for cough and shortness of breath.    Cardiovascular:  Negative for chest pain.   Gastrointestinal:  Negative for abdominal pain, blood in stool, diarrhea, nausea and vomiting.   Endocrine: Negative for cold intolerance and heat intolerance.   Genitourinary:  Negative for difficulty urinating.   Musculoskeletal:  Negative for arthralgias.   Skin:  Negative for rash.   Neurological:  Negative for dizziness and headaches.   Hematological:  Does not bruise/bleed easily.   Psychiatric/Behavioral:  Negative for behavioral problems.        Objective:  Vital signs:  Vitals:    05/06/25 1324   BP: 111/92   Pulse: 109   Resp: 18   Temp: 97.9 °F (36.6 °C)   TempSrc: Temporal   SpO2: 95%   Weight: 80.7 kg (178 lb)   Height: 162.6 cm (64\")   PainSc: 0-No pain           Body mass index is 30.55 kg/m².  ECOG  (0) Fully active, able to carry on all predisease performance without restriction    Physical Exam:   Physical Exam  Constitutional:       Appearance: Normal appearance.   HENT:      Head: Normocephalic and atraumatic.   Eyes:      Pupils: Pupils are equal, round, and reactive to light.   Cardiovascular:      Rate and Rhythm: " Normal rate and regular rhythm.      Pulses: Normal pulses.      Heart sounds: Normal heart sounds. No murmur heard.  Pulmonary:      Effort: Pulmonary effort is normal.      Breath sounds: Normal breath sounds.   Abdominal:      General: There is no distension.      Palpations: Abdomen is soft. There is no mass.      Tenderness: There is no abdominal tenderness.   Musculoskeletal:         General: Normal range of motion.      Cervical back: Normal range of motion and neck supple.   Skin:     General: Skin is warm.   Neurological:      General: No focal deficit present.      Mental Status: She is alert.   Psychiatric:         Mood and Affect: Mood normal.     I have reexamined the patient and the results are consistent with the previously documented exam. Cadence Nazario MD        Lab Results - Last 18 Months   Lab Units 05/06/25  1251 04/21/25  0855 04/08/25  1416   WBC 10*3/mm3 6.47 6.90 6.92   HEMOGLOBIN g/dL 13.1 12.7 13.8   HEMATOCRIT % 40.2 40.1 42.7   PLATELETS 10*3/mm3 191 197 100*   MCV fL 82.5 84.8 84.2     Lab Results - Last 18 Months   Lab Units 04/21/25  0929 03/31/25  0907 03/06/25  0923 01/14/25  1144 12/17/24  1216 11/26/24  0938   SODIUM mmol/L  --   --  141 141  --  140   POTASSIUM mmol/L  --   --  3.8 4.3  --  4.0   CHLORIDE mmol/L  --   --  104 107  --  106   CO2 mmol/L  --   --  23.7 24.6  --  22.8   BUN mg/dL  --   --  19 26*  --  20   CREATININE mg/dL 1.00 1.00 1.00 1.00   < > 0.90   CALCIUM mg/dL  --   --  9.5 9.4  --  9.3   BILIRUBIN mg/dL  --   --  0.6 0.5  --  0.5   ALK PHOS U/L  --   --  91 88  --  88   ALT (SGPT) U/L  --   --  11 6  --  <5   AST (SGOT) U/L  --   --  26 20  --  19   GLUCOSE mg/dL  --   --  100* 103*  --  108*    < > = values in this interval not displayed.       Lab Results   Component Value Date    GLUCOSE 100 (H) 03/06/2025    BUN 19 03/06/2025    CREATININE 1.00 04/21/2025    EGFRIFNONA 57 (L) 11/10/2021    EGFRIFAFRI 66 11/10/2021    BCR 19.0 03/06/2025    K  "3.8 03/06/2025    CO2 23.7 03/06/2025    CALCIUM 9.5 03/06/2025    ALBUMIN 4.0 03/06/2025    AST 26 03/06/2025    ALT 11 03/06/2025       Lab Results - Last 18 Months   Lab Units 04/05/24  0415   INR  0.99       No results found for: \"IRON\", \"TIBC\", \"FERRITIN\"    No results found for: \"FOLATE\"    No results found for: \"OCCULTBLD\"    No results found for: \"RETICCTPCT\"  No results found for: \"CLRCGTVI22\"  No results found for: \"SPEP\", \"UPEP\"  No results found for: \"LDH\", \"URICACID\"  No results found for: \"BABAK\", \"RF\", \"SEDRATE\"  No results found for: \"FIBRINOGEN\", \"HAPTOGLOBIN\"  Lab Results   Component Value Date    INR 0.99 04/05/2024     No results found for: \"\"  No results found for: \"CEA\"  No components found for: \"CA-19-9\"  No results found for: \"PSA\"  No results found for: \"SEDRATE\"      Assessment & Plan     Tracy Reddy is a 63 y.o. female with history of pulmonary embolism and bilateral invasive ductal carcinoma status post bilateral mastectomy, chemotherapy and radiation treatments, on Arimidex.  She presented with dyspnea and was found to have a lung mass in the right lower lobe with right hilar and suprahilar lymphadenopathy.     Right lung mass-metastatic ductal carcinoma of the breast -recurrent, ER/DC positive HER2/nancy 3+ on immunohistochemistry    CT imaging showed mass present within the medial right lower lobe measuring up to 3.2 x 4.1 cm that extends to the right hilar region.  Additional conglomerate of nodes present within right hilar to right suprahilar region measuring up to 2.7 x 1.8 cm.  Status post bronchoscopy EBUS 4/5/2024.    Biopsy confirms metastatic ductal carcinoma of the breast ER/DC positive, HER2 nu pending.  Further treatment will depend on HER2/nancy status.  Will also send tumor sample out for Direct[employer] NGS testing.  She is on Arimidex right now but will need to switch to an alternate AI or a different agent given mutational analysis findings.  If HER2 positive disease, will " use HER2 blockade.  Given there is no other clear areas of metastasis.  We can still consider curative treatment with either radio-ablation or surgical resection.    Discussed at tumor board. Not a surgical candidate based on tumor location. Radiation possible. Will plan on starting chemo with herceptin perjeta and then consider radiation after 6 cycles.    NGS testing with guardant showing PIK3CA.  See report    Continue herceptin perjeta maintenance    Started treatment tolerating well has some side effects as above not significant to need any dose modification  CT imaging now with good response. Reviewed images independently and showed the patient. Reviewed labs, and notes from other providers.ordered labs    Our plan would be to continue 3 more cycles of Taxotere, Herceptin and Perjeta and then discuss at tumor board again about consolidation with surgery vs radiation. Initial thought was that radiation would be preferable given location.  Continue treatment no changes.    She has completed planned 6 cycles of Taxotere, Herceptin and Perjeta        Continue letrozole 2.5 mg p.o. daily     Continue Kadcyla      She is now receiving Kadcyla initiated on 3/1/2025    Continue the same    Restaging CT scans overall remained stable      Insomnia  - complains more with steroids  Takes melatonin does not work effectively therefore we will begin Restoril 7.5 mg nightly as needed for insomnia        History of bilateral invasive ductal carcinoma    -Diagnosed in 2020. She is status post bilateral mastectomy with reconstruction in 2021.  She is also completed neoadjuvant chemotherapy with 6 cycles of TCHP.  Taxotere and carboplatin with dose reductions at cycle 2 secondary to chemotherapy-induced nausea and vomiting.  There is some confusion about her pathology as some reports show HER2 negative, some show positive.  However she did receive adjuvant Herceptin Perjeta maintenance which likely points towards HER2 positive  disease.  She is also known to have CHEK2 mutation.  Completed adjuvant Herceptin Perjeta for 1 year.  Status post radiation therapy left chest wall and regional nodes, 5 fractions for a total dose of 5000 cGray. Current treatment with hormone blockade with Arimidex. She followed at [facility] has transferred care to us now         Pneumonia  This has resolved    Osteopenia  DEXA with osteopenia. continue calcium vitamin D.  Continue    Rash  Likely herceptin related  Lasted for a week   Recommend hydrocortisone topical   Resolved    Neutropenia due to chemotherapy    Neutropenic precautions  Add Neulasta with cycle #6  Resolved    Diarrhea   Mild      Needs 2 D echo q 3 months for her 2 directed therapy.  2D echo will be due 5/25/2025        Continue treatment  On chemotherapy requiring intensive monitoring.  Follow-up in 3 weeks    Reviewed PET scan with patient in detail          Follow-up 4 weeks        Electronically signed by Cadence Nazario MD, 05/06/25, 4:56 PM EDT.

## 2025-05-06 ENCOUNTER — HOSPITAL ENCOUNTER (OUTPATIENT)
Dept: ONCOLOGY | Facility: HOSPITAL | Age: 65
Discharge: HOME | End: 2025-05-06
Admitting: INTERNAL MEDICINE
Payer: COMMERCIAL

## 2025-05-06 ENCOUNTER — OFFICE VISIT (OUTPATIENT)
Dept: ONCOLOGY | Facility: CLINIC | Age: 65
End: 2025-05-06
Payer: COMMERCIAL

## 2025-05-06 ENCOUNTER — OFFICE VISIT (OUTPATIENT)
Dept: RADIATION ONCOLOGY | Facility: HOSPITAL | Age: 65
End: 2025-05-06
Payer: COMMERCIAL

## 2025-05-06 VITALS
OXYGEN SATURATION: 95 % | TEMPERATURE: 97.9 F | WEIGHT: 178 LBS | HEART RATE: 109 BPM | HEIGHT: 64 IN | BODY MASS INDEX: 30.39 KG/M2 | DIASTOLIC BLOOD PRESSURE: 92 MMHG | SYSTOLIC BLOOD PRESSURE: 111 MMHG | RESPIRATION RATE: 18 BRPM

## 2025-05-06 VITALS
HEART RATE: 109 BPM | WEIGHT: 177 LBS | SYSTOLIC BLOOD PRESSURE: 102 MMHG | BODY MASS INDEX: 30.38 KG/M2 | RESPIRATION RATE: 18 BRPM | OXYGEN SATURATION: 95 % | DIASTOLIC BLOOD PRESSURE: 64 MMHG

## 2025-05-06 DIAGNOSIS — C50.911 BILATERAL MALIGNANT NEOPLASM OF BREAST IN FEMALE, ESTROGEN RECEPTOR POSITIVE, UNSPECIFIED SITE OF BREAST: Primary | ICD-10-CM

## 2025-05-06 DIAGNOSIS — C50.912 BILATERAL MALIGNANT NEOPLASM OF BREAST IN FEMALE, ESTROGEN RECEPTOR POSITIVE, UNSPECIFIED SITE OF BREAST: ICD-10-CM

## 2025-05-06 DIAGNOSIS — Z17.0 BILATERAL MALIGNANT NEOPLASM OF BREAST IN FEMALE, ESTROGEN RECEPTOR POSITIVE, UNSPECIFIED SITE OF BREAST: ICD-10-CM

## 2025-05-06 DIAGNOSIS — C50.911 INVASIVE DUCTAL CARCINOMA OF BREAST, FEMALE, RIGHT: ICD-10-CM

## 2025-05-06 DIAGNOSIS — C50.911 BILATERAL MALIGNANT NEOPLASM OF BREAST IN FEMALE, ESTROGEN RECEPTOR POSITIVE, UNSPECIFIED SITE OF BREAST: ICD-10-CM

## 2025-05-06 DIAGNOSIS — R91.8 LUNG MASS: Primary | ICD-10-CM

## 2025-05-06 DIAGNOSIS — C50.911 BREAST CANCER METASTASIZED TO INTRATHORACIC LYMPH NODE, RIGHT: ICD-10-CM

## 2025-05-06 DIAGNOSIS — Z17.0 BILATERAL MALIGNANT NEOPLASM OF BREAST IN FEMALE, ESTROGEN RECEPTOR POSITIVE, UNSPECIFIED SITE OF BREAST: Primary | ICD-10-CM

## 2025-05-06 DIAGNOSIS — C77.1 BREAST CANCER METASTASIZED TO INTRATHORACIC LYMPH NODE, RIGHT: ICD-10-CM

## 2025-05-06 DIAGNOSIS — C50.912 BILATERAL MALIGNANT NEOPLASM OF BREAST IN FEMALE, ESTROGEN RECEPTOR POSITIVE, UNSPECIFIED SITE OF BREAST: Primary | ICD-10-CM

## 2025-05-06 DIAGNOSIS — C78.00 CARCINOMA OF RIGHT BREAST METASTATIC TO LUNG: Primary | ICD-10-CM

## 2025-05-06 DIAGNOSIS — C50.911 CARCINOMA OF RIGHT BREAST METASTATIC TO LUNG: Primary | ICD-10-CM

## 2025-05-06 LAB
BASOPHILS # BLD AUTO: 0.05 10*3/MM3 (ref 0–0.2)
BASOPHILS NFR BLD AUTO: 0.8 % (ref 0–1.5)
DEPRECATED RDW RBC AUTO: 47.9 FL (ref 37–54)
EOSINOPHIL # BLD AUTO: 0.37 10*3/MM3 (ref 0–0.4)
EOSINOPHIL NFR BLD AUTO: 5.7 % (ref 0.3–6.2)
ERYTHROCYTE [DISTWIDTH] IN BLOOD BY AUTOMATED COUNT: 16 % (ref 12.3–15.4)
HCT VFR BLD AUTO: 40.2 % (ref 34–46.6)
HGB BLD-MCNC: 13.1 G/DL (ref 12–15.9)
LYMPHOCYTES # BLD AUTO: 1.12 10*3/MM3 (ref 0.7–3.1)
LYMPHOCYTES NFR BLD AUTO: 17.3 % (ref 19.6–45.3)
MCH RBC QN AUTO: 26.9 PG (ref 26.6–33)
MCHC RBC AUTO-ENTMCNC: 32.6 G/DL (ref 31.5–35.7)
MCV RBC AUTO: 82.5 FL (ref 79–97)
MONOCYTES # BLD AUTO: 0.74 10*3/MM3 (ref 0.1–0.9)
MONOCYTES NFR BLD AUTO: 11.4 % (ref 5–12)
NEUTROPHILS NFR BLD AUTO: 4.19 10*3/MM3 (ref 1.7–7)
NEUTROPHILS NFR BLD AUTO: 64.8 % (ref 42.7–76)
PLATELET # BLD AUTO: 191 10*3/MM3 (ref 140–450)
PMV BLD AUTO: 9.5 FL (ref 6–12)
RBC # BLD AUTO: 4.87 10*6/MM3 (ref 3.77–5.28)
WBC NRBC COR # BLD AUTO: 6.47 10*3/MM3 (ref 3.4–10.8)

## 2025-05-06 PROCEDURE — G0463 HOSPITAL OUTPT CLINIC VISIT: HCPCS | Performed by: INTERNAL MEDICINE

## 2025-05-06 PROCEDURE — 36591 DRAW BLOOD OFF VENOUS DEVICE: CPT

## 2025-05-06 PROCEDURE — 85025 COMPLETE CBC W/AUTO DIFF WBC: CPT | Performed by: INTERNAL MEDICINE

## 2025-05-06 PROCEDURE — 25010000002 HEPARIN LOCK FLUSH PER 10 UNITS: Performed by: INTERNAL MEDICINE

## 2025-05-06 RX ORDER — SODIUM CHLORIDE 0.9 % (FLUSH) 0.9 %
10 SYRINGE (ML) INJECTION AS NEEDED
Status: CANCELLED | OUTPATIENT
Start: 2025-05-06

## 2025-05-06 RX ORDER — HEPARIN SODIUM (PORCINE) LOCK FLUSH IV SOLN 100 UNIT/ML 100 UNIT/ML
500 SOLUTION INTRAVENOUS AS NEEDED
Status: DISCONTINUED | OUTPATIENT
Start: 2025-05-06 | End: 2025-05-07 | Stop reason: HOSPADM

## 2025-05-06 RX ORDER — HEPARIN SODIUM (PORCINE) LOCK FLUSH IV SOLN 100 UNIT/ML 100 UNIT/ML
500 SOLUTION INTRAVENOUS AS NEEDED
Status: CANCELLED | OUTPATIENT
Start: 2025-05-06

## 2025-05-06 RX ORDER — SODIUM CHLORIDE 0.9 % (FLUSH) 0.9 %
10 SYRINGE (ML) INJECTION AS NEEDED
Status: DISCONTINUED | OUTPATIENT
Start: 2025-05-06 | End: 2025-05-07 | Stop reason: HOSPADM

## 2025-05-06 RX ADMIN — Medication 10 ML: at 12:59

## 2025-05-06 RX ADMIN — HEPARIN 500 UNITS: 100 SYRINGE at 12:59

## 2025-05-06 NOTE — PROGRESS NOTES
Port accessed and flushed with good blood return noted. 10cc of blood wasted prior to specimen collection. Blood specimen obtained and sent to lab for processing per protocol.  Port flushed with saline and heparin prior to needle removal. Pt back to waiting room for MD visit.

## 2025-05-12 ENCOUNTER — HOSPITAL ENCOUNTER (OUTPATIENT)
Dept: ONCOLOGY | Facility: HOSPITAL | Age: 65
Discharge: HOME | End: 2025-05-12
Admitting: INTERNAL MEDICINE
Payer: COMMERCIAL

## 2025-05-12 VITALS
DIASTOLIC BLOOD PRESSURE: 71 MMHG | WEIGHT: 179 LBS | TEMPERATURE: 97.1 F | RESPIRATION RATE: 16 BRPM | HEART RATE: 94 BPM | BODY MASS INDEX: 30.56 KG/M2 | OXYGEN SATURATION: 94 % | HEIGHT: 64 IN | SYSTOLIC BLOOD PRESSURE: 105 MMHG

## 2025-05-12 DIAGNOSIS — Z17.0 BILATERAL MALIGNANT NEOPLASM OF BREAST IN FEMALE, ESTROGEN RECEPTOR POSITIVE, UNSPECIFIED SITE OF BREAST: Primary | ICD-10-CM

## 2025-05-12 DIAGNOSIS — C50.911 BILATERAL MALIGNANT NEOPLASM OF BREAST IN FEMALE, ESTROGEN RECEPTOR POSITIVE, UNSPECIFIED SITE OF BREAST: Primary | ICD-10-CM

## 2025-05-12 DIAGNOSIS — C50.912 BILATERAL MALIGNANT NEOPLASM OF BREAST IN FEMALE, ESTROGEN RECEPTOR POSITIVE, UNSPECIFIED SITE OF BREAST: Primary | ICD-10-CM

## 2025-05-12 DIAGNOSIS — R91.8 LUNG MASS: ICD-10-CM

## 2025-05-12 LAB
ALP BLD-CCNC: 76 U/L (ref 42–141)
BASOPHILS # BLD AUTO: 0.06 10*3/MM3 (ref 0–0.2)
BASOPHILS NFR BLD AUTO: 1.3 % (ref 0–1.5)
BUN BLDA-MCNC: 16 MG/DL (ref 7–22)
CALCIUM BLD QL: 9.8 MG/DL (ref 8–10.3)
CHLORIDE BLDA-SCNC: 111 MMOL/L (ref 98–108)
CO2 BLDA-SCNC: 26 MMOL/L (ref 18–33)
CREAT BLDA-MCNC: 1 MG/DL (ref 0.6–1.2)
DEPRECATED RDW RBC AUTO: 48.7 FL (ref 37–54)
EGFRCR SERPLBLD CKD-EPI 2021: 63.4 ML/MIN/1.73
EOSINOPHIL # BLD AUTO: 0.34 10*3/MM3 (ref 0–0.4)
EOSINOPHIL NFR BLD AUTO: 7.4 % (ref 0.3–6.2)
ERYTHROCYTE [DISTWIDTH] IN BLOOD BY AUTOMATED COUNT: 15.9 % (ref 12.3–15.4)
GLUCOSE BLDC GLUCOMTR-MCNC: 92 MG/DL (ref 73–118)
HCT VFR BLD AUTO: 40 % (ref 34–46.6)
HGB BLD-MCNC: 12.5 G/DL (ref 12–15.9)
LYMPHOCYTES # BLD AUTO: 0.85 10*3/MM3 (ref 0.7–3.1)
LYMPHOCYTES NFR BLD AUTO: 18.5 % (ref 19.6–45.3)
MCH RBC QN AUTO: 26.5 PG (ref 26.6–33)
MCHC RBC AUTO-ENTMCNC: 31.3 G/DL (ref 31.5–35.7)
MCV RBC AUTO: 84.9 FL (ref 79–97)
MONOCYTES # BLD AUTO: 0.51 10*3/MM3 (ref 0.1–0.9)
MONOCYTES NFR BLD AUTO: 11.1 % (ref 5–12)
NEUTROPHILS NFR BLD AUTO: 2.84 10*3/MM3 (ref 1.7–7)
NEUTROPHILS NFR BLD AUTO: 61.7 % (ref 42.7–76)
PLATELET # BLD AUTO: 186 10*3/MM3 (ref 140–450)
PMV BLD AUTO: 9.3 FL (ref 6–12)
POC ALBUMIN: 3.4 G/L (ref 3.3–5.5)
POC ALT (SGPT): 27 U/L (ref 10–47)
POC AST (SGOT): 43 U/L (ref 11–38)
POC TOTAL BILIRUBIN: 0.8 MG/DL (ref 0.2–1.6)
POC TOTAL PROTEIN: 6.7 G/DL (ref 6.4–8.1)
POTASSIUM BLDA-SCNC: 4.2 MMOL/L (ref 3.6–5.1)
RBC # BLD AUTO: 4.71 10*6/MM3 (ref 3.77–5.28)
SODIUM BLD-SCNC: 146 MMOL/L (ref 128–145)
WBC NRBC COR # BLD AUTO: 4.6 10*3/MM3 (ref 3.4–10.8)

## 2025-05-12 PROCEDURE — 25010000002 DEXAMETHASONE PER 1 MG: Performed by: NURSE PRACTITIONER

## 2025-05-12 PROCEDURE — 96375 TX/PRO/DX INJ NEW DRUG ADDON: CPT

## 2025-05-12 PROCEDURE — 25810000003 SODIUM CHLORIDE 0.9 % SOLUTION 250 ML FLEX CONT: Performed by: NURSE PRACTITIONER

## 2025-05-12 PROCEDURE — 25010000002 HEPARIN LOCK FLUSH PER 10 UNITS: Performed by: INTERNAL MEDICINE

## 2025-05-12 PROCEDURE — 85025 COMPLETE CBC W/AUTO DIFF WBC: CPT | Performed by: INTERNAL MEDICINE

## 2025-05-12 PROCEDURE — 25010000002 ADO-TRASTUZUMAB 100 MG RECONSTITUTED SOLUTION 1 EACH VIAL: Performed by: NURSE PRACTITIONER

## 2025-05-12 PROCEDURE — 96413 CHEMO IV INFUSION 1 HR: CPT

## 2025-05-12 PROCEDURE — 80053 COMPREHEN METABOLIC PANEL: CPT

## 2025-05-12 RX ORDER — HEPARIN SODIUM (PORCINE) LOCK FLUSH IV SOLN 100 UNIT/ML 100 UNIT/ML
500 SOLUTION INTRAVENOUS AS NEEDED
OUTPATIENT
Start: 2025-05-12

## 2025-05-12 RX ORDER — SODIUM CHLORIDE 0.9 % (FLUSH) 0.9 %
10 SYRINGE (ML) INJECTION AS NEEDED
Status: DISCONTINUED | OUTPATIENT
Start: 2025-05-12 | End: 2025-05-13 | Stop reason: HOSPADM

## 2025-05-12 RX ORDER — SODIUM CHLORIDE 0.9 % (FLUSH) 0.9 %
10 SYRINGE (ML) INJECTION AS NEEDED
OUTPATIENT
Start: 2025-05-12

## 2025-05-12 RX ORDER — HEPARIN SODIUM (PORCINE) LOCK FLUSH IV SOLN 100 UNIT/ML 100 UNIT/ML
500 SOLUTION INTRAVENOUS AS NEEDED
Status: DISCONTINUED | OUTPATIENT
Start: 2025-05-12 | End: 2025-05-13 | Stop reason: HOSPADM

## 2025-05-12 RX ORDER — SODIUM CHLORIDE 9 MG/ML
20 INJECTION, SOLUTION INTRAVENOUS ONCE
Status: DISCONTINUED | OUTPATIENT
Start: 2025-05-12 | End: 2025-05-13 | Stop reason: HOSPADM

## 2025-05-12 RX ORDER — SODIUM CHLORIDE 9 MG/ML
20 INJECTION, SOLUTION INTRAVENOUS ONCE
Status: CANCELLED | OUTPATIENT
Start: 2025-05-12

## 2025-05-12 RX ORDER — DEXAMETHASONE SODIUM PHOSPHATE 4 MG/ML
12 INJECTION, SOLUTION INTRA-ARTICULAR; INTRALESIONAL; INTRAMUSCULAR; INTRAVENOUS; SOFT TISSUE ONCE
Status: COMPLETED | OUTPATIENT
Start: 2025-05-12 | End: 2025-05-12

## 2025-05-12 RX ADMIN — DEXAMETHASONE SODIUM PHOSPHATE 12 MG: 4 INJECTION INTRA-ARTICULAR; INTRALESIONAL; INTRAMUSCULAR; INTRAVENOUS; SOFT TISSUE at 10:34

## 2025-05-12 RX ADMIN — Medication 500 UNITS: at 11:32

## 2025-05-12 RX ADMIN — Medication 10 ML: at 11:32

## 2025-05-12 RX ADMIN — ADO-TRASTUZUMAB EMTANSINE 285 MG: 20 INJECTION, POWDER, LYOPHILIZED, FOR SOLUTION INTRAVENOUS at 10:51

## 2025-05-12 NOTE — PROGRESS NOTES
Patient in clinc for C4 Kadcyla.  Port accessed with sterile technique and positive blood return noted.  Blood drawn from port.  10 cc blood wasted prior to specimen collection.  Specimens sent to lab for processing. Lab values within treatment parameters. Patient given treatment per MAR and tolerated. Patient discharged, declined AVS.

## 2025-05-22 ENCOUNTER — HOSPITAL ENCOUNTER (OUTPATIENT)
Dept: CARDIOLOGY | Facility: HOSPITAL | Age: 65
Discharge: HOME | End: 2025-05-22
Admitting: INTERNAL MEDICINE
Payer: COMMERCIAL

## 2025-05-22 VITALS
DIASTOLIC BLOOD PRESSURE: 66 MMHG | HEIGHT: 64 IN | SYSTOLIC BLOOD PRESSURE: 131 MMHG | BODY MASS INDEX: 30.56 KG/M2 | WEIGHT: 179 LBS

## 2025-05-22 DIAGNOSIS — C50.912 BILATERAL MALIGNANT NEOPLASM OF BREAST IN FEMALE, ESTROGEN RECEPTOR POSITIVE, UNSPECIFIED SITE OF BREAST: ICD-10-CM

## 2025-05-22 DIAGNOSIS — Z17.0 BILATERAL MALIGNANT NEOPLASM OF BREAST IN FEMALE, ESTROGEN RECEPTOR POSITIVE, UNSPECIFIED SITE OF BREAST: ICD-10-CM

## 2025-05-22 DIAGNOSIS — C50.911 BILATERAL MALIGNANT NEOPLASM OF BREAST IN FEMALE, ESTROGEN RECEPTOR POSITIVE, UNSPECIFIED SITE OF BREAST: ICD-10-CM

## 2025-05-22 LAB
AORTIC DIMENSIONLESS INDEX: 0.68 (DI)
AV MEAN PRESS GRAD SYS DOP V1V2: 3.1 MMHG
AV VMAX SYS DOP: 116.2 CM/SEC
BH CV ECHO MEAS - AO MAX PG: 5.4 MMHG
BH CV ECHO MEAS - AO ROOT DIAM: 3.3 CM
BH CV ECHO MEAS - AO V2 VTI: 22.1 CM
BH CV ECHO MEAS - AVA(I,D): 1.92 CM2
BH CV ECHO MEAS - EDV(CUBED): 67.5 ML
BH CV ECHO MEAS - EDV(MOD-SP2): 55.1 ML
BH CV ECHO MEAS - EDV(MOD-SP4): 63 ML
BH CV ECHO MEAS - EF(MOD-SP2): 59 %
BH CV ECHO MEAS - EF(MOD-SP4): 57.6 %
BH CV ECHO MEAS - ESV(CUBED): 22.9 ML
BH CV ECHO MEAS - ESV(MOD-SP2): 22.6 ML
BH CV ECHO MEAS - ESV(MOD-SP4): 26.7 ML
BH CV ECHO MEAS - FS: 30.3 %
BH CV ECHO MEAS - IVS/LVPW: 0.9 CM
BH CV ECHO MEAS - IVSD: 0.9 CM
BH CV ECHO MEAS - LA DIMENSION: 3.3 CM
BH CV ECHO MEAS - LAT PEAK E' VEL: 9.5 CM/SEC
BH CV ECHO MEAS - LV DIASTOLIC VOL/BSA (35-75): 33.8 CM2
BH CV ECHO MEAS - LV MASS(C)D: 122.8 GRAMS
BH CV ECHO MEAS - LV MAX PG: 2.6 MMHG
BH CV ECHO MEAS - LV MEAN PG: 1.42 MMHG
BH CV ECHO MEAS - LV SYSTOLIC VOL/BSA (12-30): 14.3 CM2
BH CV ECHO MEAS - LV V1 MAX: 81.3 CM/SEC
BH CV ECHO MEAS - LV V1 VTI: 14.9 CM
BH CV ECHO MEAS - LVIDD: 4.1 CM
BH CV ECHO MEAS - LVIDS: 2.8 CM
BH CV ECHO MEAS - LVOT AREA: 2.8 CM2
BH CV ECHO MEAS - LVOT DIAM: 1.9 CM
BH CV ECHO MEAS - LVPWD: 1.01 CM
BH CV ECHO MEAS - MED PEAK E' VEL: 7.8 CM/SEC
BH CV ECHO MEAS - MV A DUR: 0.12 SEC
BH CV ECHO MEAS - MV A MAX VEL: 87.7 CM/SEC
BH CV ECHO MEAS - MV DEC SLOPE: 337 CM/SEC2
BH CV ECHO MEAS - MV DEC TIME: 0.21 SEC
BH CV ECHO MEAS - MV E MAX VEL: 70.8 CM/SEC
BH CV ECHO MEAS - MV E/A: 0.81
BH CV ECHO MEAS - MV MAX PG: 3 MMHG
BH CV ECHO MEAS - MV MEAN PG: 1.46 MMHG
BH CV ECHO MEAS - MV V2 VTI: 17.3 CM
BH CV ECHO MEAS - MVA(VTI): 2.46 CM2
BH CV ECHO MEAS - PA ACC TIME: 0.09 SEC
BH CV ECHO MEAS - PA V2 MAX: 102.8 CM/SEC
BH CV ECHO MEAS - PULM A REVS DUR: 0.11 SEC
BH CV ECHO MEAS - PULM A REVS VEL: 36.9 CM/SEC
BH CV ECHO MEAS - PULM DIAS VEL: 31.4 CM/SEC
BH CV ECHO MEAS - PULM S/D: 1.8
BH CV ECHO MEAS - PULM SYS VEL: 56.4 CM/SEC
BH CV ECHO MEAS - RAP SYSTOLE: 3 MMHG
BH CV ECHO MEAS - RV MAX PG: 1.57 MMHG
BH CV ECHO MEAS - RV V1 MAX: 62.6 CM/SEC
BH CV ECHO MEAS - RV V1 VTI: 11.8 CM
BH CV ECHO MEAS - RVDD: 2.8 CM
BH CV ECHO MEAS - SV(LVOT): 42.4 ML
BH CV ECHO MEAS - SV(MOD-SP2): 32.5 ML
BH CV ECHO MEAS - SV(MOD-SP4): 36.3 ML
BH CV ECHO MEAS - SVI(LVOT): 22.7 ML/M2
BH CV ECHO MEAS - SVI(MOD-SP2): 17.4 ML/M2
BH CV ECHO MEAS - SVI(MOD-SP4): 19.5 ML/M2
BH CV ECHO MEAS - TAPSE (>1.6): 1.91 CM
BH CV ECHO MEASUREMENTS AVERAGE E/E' RATIO: 8.18
LV EF BIPLANE MOD: 65 %

## 2025-05-22 PROCEDURE — 93306 TTE W/DOPPLER COMPLETE: CPT

## 2025-05-22 PROCEDURE — 93306 TTE W/DOPPLER COMPLETE: CPT | Performed by: INTERNAL MEDICINE

## 2025-05-30 NOTE — PROGRESS NOTES
HEMATOLOGY ONCOLOGY OUTPATIENT FOLLOW UP       Patient name: Tracy Reddy  : 1960  MRN: [id number]  Primary Care Physician: Provider, No Known  Referring Physician: Provider, No Known  Reason For Consult: breast cancer      History of Present Illness:    Tracy Reddy is a 64 y.o. female who presented to [facility] on 4/3/2024 with complaints of cough.  She initially presented to [facility] ED with complaints of fever up to 103 °F, productive cough with green and brown sputum, shortness of air and chest tightness that has been ongoing for several days.  She reports she has family members that have been sick at home.  She reports she has also had a history of PE in the past.  Labs show troponin was 13 x 2, .8.  CBC unremarkable.  Respiratory panel was negative. Chest x-ray showed masslike opacity within the right lower lobe.  Sputum cultures and blood cultures have been ordered.  She is a non-smoker.  Pulmonology also has been consulted.     24 CT chest angiogram  Impression:  1. No evidence of pulmonary embolism.  2. No acute cardiopulmonary process. There is a mass present within the right lower lobe likely representing malignancy. There is enlarged right hilar adenopathy likely representing local metastatic disease. No previous imaging available for comparison.        24  Hematology/Oncology was consulted.       From record review: Patient has a history of bilateral ductal carcinoma diagnosed in .  Right breast IDC, G1, ER positive, NE positive, HER-2 negative. Left breast IDC, G2, ER positive, NE positive, HER-2 negative. Left SLND revealed 1 of 2 lymph nodes positive for macro metastasis. She is status post bilateral mastectomy with reconstruction in .  She is also completed neoadjuvant chemotherapy with 6 cycles of TCHP.  Taxotere and carboplatin with dose reductions at cycle 2 secondary to chemotherapy-induced nausea and vomiting.  Completed adjuvant  Herceptin Perjeta for 1 year.  Status post radiation therapy left chest wall and regional nodes, 5 fractions for a total dose of 5000 cGray. Current treatment with hormone blockade with Arimidex.  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  She follows at [facility], reviewed most recent office note from 3/11/2024.     4/5/2024 status post bronchoscopy.  EBUS guided FNA of the right lower lobe hilar mass confirmed metastatic ductal carcinoma of the breast ER +100% PA +10%, HER2 nu pending  Respiratory panel positive for human metapneumovirus.    4/12/2024 PET/CT with 1.1 cm lesion in the region of the left vallecula concerning for second primary malignancy.  Recommend correlation with direct visualization, large right hilar mass consistent with malignancy, no evidence of metastatic disease elsewhere    Path with HER 2 positive 3+ by IHC, guardant NGS with PIK3CA     5/13/24 - started THP  6/3/24 - C2  6/24/24 - C3  7/15/2024: Patient received cycle 4 chemotherapy with Taxotere Herceptin Perjeta  8/5/2024: Patient received cycle 5 Taxotere Herceptin Perjeta      7/12/24 - CT imaging - There is decrease in size of the right hilar mass compared to the prior examination. This now measures approximately 3.0 x 1.5 cm, previously measured 4.2 x 3.4 cm using similar measurement technique.     -9/6/2024 patient had a PET CT scan there is a dramatic interval decrease in the size of the right lower lobe infrahilar mass when compared to previous PET scan.  There is no longer any visible mass or abnormal metabolic activity in this location.  There may be residual scarring in that location.  There is an uptake within the right paratracheal lymph node measuring 1.4 cm SUV 5.6 suggesting metastatic disease previously measured 0.8 cm.  No additional areas of abnormal metabolic activity was seen.  No other evidence of metastatic disease was seen    Palliative XRT today right lung mass ongoing under the care of  Dr. Wilburn  3/7/2025: Patient was initiated on single agent Kadcyla  3/31/2025: Patient received cycle 2 of Kadcyla  4/21/2025: Patient received cycle 3 of Kadcyla       I have reviewed and confirmed the accuracy of the patient's history: Chief complaint, HPI, ROS, and Subjective as entered by the MA/LPN/RN. Cadence Mireya Nazario MD 06/04/25      Subjective:    She denies any new issues.  Energy level has improved also cough has resolved.  Overall she is tolerating treatments fairly well      Denies any new issues.  She is here today for routine follow-up.      Past Medical History:   Diagnosis Date    Cancer        Past Surgical History:   Procedure Laterality Date    BRONCHOSCOPY N/A 4/5/2024    Procedure: BRONCHOSCOPY WITH ENDOBRONCHIAL ULTRASOUND, bilateral bronchial washings, fine needle aspiration x 2 areas;  Surgeon: Edy Irving MD;  Location: [facility] ENDOSCOPY;  Service: Pulmonary;  Laterality: N/A;  post: lung mass    MASTECTOMY           Current Outpatient Medications:     Calcium Carb-Cholecalciferol (Calcium + Vitamin D3) 500-5 MG-MCG tablet per tablet, Take 1 tablet by mouth 2 (Two) Times a Day., Disp: 60 tablet, Rfl: 6    Calcium Citrate-Vitamin D (Citrus Calcium/Vitamin D) 200-6.25 MG-MCG tablet, Take  by mouth., Disp: , Rfl:     cetirizine (zyrTEC) 10 MG tablet, Take 1 tablet by mouth Daily., Disp: , Rfl:     letrozole (FEMARA) 2.5 MG tablet, TAKE 1 TABLET BY MOUTH EVERY DAY, Disp: 90 tablet, Rfl: 2    omeprazole (priLOSEC) 20 MG capsule, Take 1 capsule by mouth Daily. (Patient not taking: Reported on 4/8/2025), Disp: 30 capsule, Rfl: 5    ondansetron (ZOFRAN) 8 MG tablet, Take 1 tablet by mouth 3 (Three) Times a Day As Needed for Nausea or Vomiting., Disp: 30 tablet, Rfl: 5    temazepam (RESTORIL) 7.5 MG capsule, Take 1 capsule by mouth At Night As Needed for Sleep., Disp: 30 capsule, Rfl: 0    Allergies   Allergen Reactions    Penicillins Rash       No family history on file.    Cancer-related  "family history is not on file.    Social History     Tobacco Use    Smoking status: Never     Passive exposure: Never    Smokeless tobacco: Never   Vaping Use    Vaping status: Never Used   Substance Use Topics    Alcohol use: Never    Drug use: Never     Social History     Social History Narrative    Not on file      ROS:   Review of Systems   Constitutional:  Negative for chills, fatigue and fever.   HENT:  Negative for congestion and nosebleeds.    Eyes:  Negative for pain and itching.   Respiratory:  Negative for cough and shortness of breath.    Cardiovascular:  Negative for chest pain.   Gastrointestinal:  Negative for abdominal pain, blood in stool, diarrhea, nausea and vomiting.   Endocrine: Negative for cold intolerance and heat intolerance.   Genitourinary:  Negative for difficulty urinating.   Musculoskeletal:  Negative for arthralgias.   Skin:  Negative for rash.   Neurological:  Negative for dizziness and headaches.   Hematological:  Does not bruise/bleed easily.   Psychiatric/Behavioral:  Negative for behavioral problems.        Objective:  Vital signs:  Vitals:    06/04/25 1454   BP: 113/72   Pulse: 100   Resp: 18   Temp: 97.9 °F (36.6 °C)   TempSrc: Temporal   SpO2: 95%   Weight: 80.7 kg (178 lb)   Height: 162.6 cm (64\")   PainSc: 0-No pain             Body mass index is 30.55 kg/m².  ECOG  (0) Fully active, able to carry on all predisease performance without restriction    Physical Exam:   Physical Exam  Constitutional:       Appearance: Normal appearance.   HENT:      Head: Normocephalic and atraumatic.   Eyes:      Pupils: Pupils are equal, round, and reactive to light.   Cardiovascular:      Rate and Rhythm: Normal rate and regular rhythm.      Pulses: Normal pulses.      Heart sounds: Normal heart sounds. No murmur heard.  Pulmonary:      Effort: Pulmonary effort is normal.      Breath sounds: Normal breath sounds.   Abdominal:      General: There is no distension.      Palpations: Abdomen is " "soft. There is no mass.      Tenderness: There is no abdominal tenderness.   Musculoskeletal:         General: Normal range of motion.      Cervical back: Normal range of motion and neck supple.   Skin:     General: Skin is warm.   Neurological:      General: No focal deficit present.      Mental Status: She is alert.   Psychiatric:         Mood and Affect: Mood normal.     I have reexamined the patient and the results are consistent with the previously documented exam. Cadence Nazario MD      Lab Results - Last 18 Months   Lab Units 06/02/25  0929 05/12/25  0902 05/06/25  1251   WBC 10*3/mm3 5.03 4.60 6.47   HEMOGLOBIN g/dL 12.5 12.5 13.1   HEMATOCRIT % 40.1 40.0 40.2   PLATELETS 10*3/mm3 163 186 191   MCV fL 84.4 84.9 82.5     Lab Results - Last 18 Months   Lab Units 06/02/25  0929 05/12/25  0927 04/21/25  0929 03/31/25  0907 03/06/25  0923 01/14/25  1144   SODIUM mmol/L 140  --   --   --  141 141   POTASSIUM mmol/L 3.9  --   --   --  3.8 4.3   CHLORIDE mmol/L 103  --   --   --  104 107   CO2 mmol/L 25.5  --   --   --  23.7 24.6   BUN mg/dL 18.3  --   --   --  19 26*   CREATININE mg/dL 0.89 1.00 1.00   < > 1.00 1.00   CALCIUM mg/dL 9.7  --   --   --  9.5 9.4   BILIRUBIN mg/dL 0.6  --   --   --  0.6 0.5   ALK PHOS U/L 94  --   --   --  91 88   ALT (SGPT) U/L 32  --   --   --  11 6   AST (SGOT) U/L 45*  --   --   --  26 20   GLUCOSE mg/dL 94  --   --   --  100* 103*    < > = values in this interval not displayed.       Lab Results   Component Value Date    GLUCOSE 94 06/02/2025    BUN 18.3 06/02/2025    CREATININE 0.89 06/02/2025    EGFRIFNONA 57 (L) 11/10/2021    EGFRIFAFRI 66 11/10/2021    BCR 20.6 06/02/2025    K 3.9 06/02/2025    CO2 25.5 06/02/2025    CALCIUM 9.7 06/02/2025    ALBUMIN 4.0 06/02/2025    AST 45 (H) 06/02/2025    ALT 32 06/02/2025       Lab Results - Last 18 Months   Lab Units 04/05/24  0415   INR  0.99       No results found for: \"IRON\", \"TIBC\", \"FERRITIN\"    No results found for: " "\"FOLATE\"    No results found for: \"OCCULTBLD\"    No results found for: \"RETICCTPCT\"  No results found for: \"JLYWSNHY97\"  No results found for: \"SPEP\", \"UPEP\"  No results found for: \"LDH\", \"URICACID\"  No results found for: \"BABAK\", \"RF\", \"SEDRATE\"  No results found for: \"FIBRINOGEN\", \"HAPTOGLOBIN\"  Lab Results   Component Value Date    INR 0.99 04/05/2024     No results found for: \"\"  No results found for: \"CEA\"  No components found for: \"CA-19-9\"  No results found for: \"PSA\"  No results found for: \"SEDRATE\"      Assessment & Plan     Tracy Reddy is a 63 y.o. female with history of pulmonary embolism and bilateral invasive ductal carcinoma status post bilateral mastectomy, chemotherapy and radiation treatments, on Arimidex.  She presented with dyspnea and was found to have a lung mass in the right lower lobe with right hilar and suprahilar lymphadenopathy.     Right lung mass-metastatic ductal carcinoma of the breast -recurrent, ER/FL positive HER2/nancy 3+ on immunohistochemistry    CT imaging showed mass present within the medial right lower lobe measuring up to 3.2 x 4.1 cm that extends to the right hilar region.  Additional conglomerate of nodes present within right hilar to right suprahilar region measuring up to 2.7 x 1.8 cm.  Status post bronchoscopy EBUS 4/5/2024.    Biopsy confirms metastatic ductal carcinoma of the breast ER/FL positive, HER2 nu pending.  Further treatment will depend on HER2/nancy status.  Will also send tumor sample out for [facility] NGS testing.  She is on Arimidex right now but will need to switch to an alternate AI or a different agent given mutational analysis findings.  If HER2 positive disease, will use HER2 blockade.  Given there is no other clear areas of metastasis.  We can still consider curative treatment with either radio-ablation or surgical resection.    Discussed at tumor board. Not a surgical candidate based on tumor location. Radiation possible. Will plan on starting " chemo with herceptin perjeta and then consider radiation after 6 cycles.    NGS testing with guardant showing PIK3CA.  See report    Continue herceptin perjeta maintenance    Started treatment tolerating well has some side effects as above not significant to need any dose modification  CT imaging now with good response. Reviewed images independently and showed the patient. Reviewed labs, and notes from other providers.ordered labs    Our plan would be to continue 3 more cycles of Taxotere, Herceptin and Perjeta and then discuss at tumor board again about consolidation with surgery vs radiation. Initial thought was that radiation would be preferable given location.  Continue treatment no changes.    She has completed planned 6 cycles of Taxotere, Herceptin and Perjeta        She will continue letrozole 2.5 mg p.o. daily     She will continue  Kadcyla      She is now receiving Kadcyla initiated on 3/1/2025    Continue the same    Restaging CT scans overall remains stable      Insomnia  - complains more with steroids  Takes melatonin does not work effectively therefore we will begin Restoril 7.5 mg nightly as needed for insomnia    She will continue Restoril prn        History of bilateral invasive ductal carcinoma    -Diagnosed in 2020. She is status post bilateral mastectomy with reconstruction in 2021.  She is also completed neoadjuvant chemotherapy with 6 cycles of TCHP.  Taxotere and carboplatin with dose reductions at cycle 2 secondary to chemotherapy-induced nausea and vomiting.  There is some confusion about her pathology as some reports show HER2 negative, some show positive.  However she did receive adjuvant Herceptin Perjeta maintenance which likely points towards HER2 positive disease.  She is also known to have CHEK2 mutation.  Completed adjuvant Herceptin Perjeta for 1 year.  Status post radiation therapy left chest wall and regional nodes, 5 fractions for a total dose of 5000 cGray. Current treatment  with hormone blockade with Arimidex. She followed at [facility] has transferred care to us now         Pneumonia  This has resolved    Osteopenia  DEXA with osteopenia. continue calcium vitamin D.  Continue    Rash  Likely herceptin related  Lasted for a week   Recommend hydrocortisone topical   Resolved    Neutropenia due to chemotherapy    Neutropenic precautions  Add Neulasta with cycle #6  Resolved    Diarrhea   Mild      Needs 2 D echo q 3 months for her 2 directed therapy.  2D echo will be due 8/22/2025        Continue treatment  On chemotherapy requiring intensive monitoring.  Follow-up in 3 weeks    Reviewed PET scan with patient in detail          FU in 4 weeks.        Electronically signed by Cadence Nazario MD, 06/04/25, 4:17 PM EDT.

## 2025-06-02 ENCOUNTER — HOSPITAL ENCOUNTER (OUTPATIENT)
Dept: ONCOLOGY | Facility: HOSPITAL | Age: 65
Discharge: HOME | End: 2025-06-02
Admitting: INTERNAL MEDICINE
Payer: COMMERCIAL

## 2025-06-02 VITALS
OXYGEN SATURATION: 94 % | HEART RATE: 96 BPM | SYSTOLIC BLOOD PRESSURE: 120 MMHG | BODY MASS INDEX: 30.32 KG/M2 | RESPIRATION RATE: 16 BRPM | DIASTOLIC BLOOD PRESSURE: 78 MMHG | WEIGHT: 177.6 LBS | HEIGHT: 64 IN | TEMPERATURE: 97.5 F

## 2025-06-02 DIAGNOSIS — Z17.0 BILATERAL MALIGNANT NEOPLASM OF BREAST IN FEMALE, ESTROGEN RECEPTOR POSITIVE, UNSPECIFIED SITE OF BREAST: Primary | ICD-10-CM

## 2025-06-02 DIAGNOSIS — C50.911 BILATERAL MALIGNANT NEOPLASM OF BREAST IN FEMALE, ESTROGEN RECEPTOR POSITIVE, UNSPECIFIED SITE OF BREAST: Primary | ICD-10-CM

## 2025-06-02 DIAGNOSIS — C50.912 BILATERAL MALIGNANT NEOPLASM OF BREAST IN FEMALE, ESTROGEN RECEPTOR POSITIVE, UNSPECIFIED SITE OF BREAST: Primary | ICD-10-CM

## 2025-06-02 DIAGNOSIS — R91.8 LUNG MASS: ICD-10-CM

## 2025-06-02 LAB
ALBUMIN SERPL-MCNC: 4 G/DL (ref 3.5–5.2)
ALBUMIN/GLOB SERPL: 1.5 G/DL
ALP SERPL-CCNC: 94 U/L (ref 39–117)
ALT SERPL W P-5'-P-CCNC: 32 U/L (ref 1–33)
ANION GAP SERPL CALCULATED.3IONS-SCNC: 11.5 MMOL/L (ref 5–15)
AST SERPL-CCNC: 45 U/L (ref 1–32)
BASOPHILS # BLD AUTO: 0.04 10*3/MM3 (ref 0–0.2)
BASOPHILS NFR BLD AUTO: 0.8 % (ref 0–1.5)
BILIRUB SERPL-MCNC: 0.6 MG/DL (ref 0–1.2)
BUN SERPL-MCNC: 18.3 MG/DL (ref 8–23)
BUN/CREAT SERPL: 20.6 (ref 7–25)
CALCIUM SPEC-SCNC: 9.7 MG/DL (ref 8.6–10.5)
CHLORIDE SERPL-SCNC: 103 MMOL/L (ref 98–107)
CO2 SERPL-SCNC: 25.5 MMOL/L (ref 22–29)
CREAT SERPL-MCNC: 0.89 MG/DL (ref 0.57–1)
DEPRECATED RDW RBC AUTO: 50 FL (ref 37–54)
EGFRCR SERPLBLD CKD-EPI 2021: 73 ML/MIN/1.73
EOSINOPHIL # BLD AUTO: 0.29 10*3/MM3 (ref 0–0.4)
EOSINOPHIL NFR BLD AUTO: 5.8 % (ref 0.3–6.2)
ERYTHROCYTE [DISTWIDTH] IN BLOOD BY AUTOMATED COUNT: 16.3 % (ref 12.3–15.4)
GLOBULIN UR ELPH-MCNC: 2.7 GM/DL
GLUCOSE SERPL-MCNC: 94 MG/DL (ref 65–99)
HCT VFR BLD AUTO: 40.1 % (ref 34–46.6)
HGB BLD-MCNC: 12.5 G/DL (ref 12–15.9)
LYMPHOCYTES # BLD AUTO: 0.68 10*3/MM3 (ref 0.7–3.1)
LYMPHOCYTES NFR BLD AUTO: 13.5 % (ref 19.6–45.3)
MCH RBC QN AUTO: 26.3 PG (ref 26.6–33)
MCHC RBC AUTO-ENTMCNC: 31.2 G/DL (ref 31.5–35.7)
MCV RBC AUTO: 84.4 FL (ref 79–97)
MONOCYTES # BLD AUTO: 0.49 10*3/MM3 (ref 0.1–0.9)
MONOCYTES NFR BLD AUTO: 9.7 % (ref 5–12)
NEUTROPHILS NFR BLD AUTO: 3.53 10*3/MM3 (ref 1.7–7)
NEUTROPHILS NFR BLD AUTO: 70.2 % (ref 42.7–76)
PLATELET # BLD AUTO: 163 10*3/MM3 (ref 140–450)
PMV BLD AUTO: 9.1 FL (ref 6–12)
POTASSIUM SERPL-SCNC: 3.9 MMOL/L (ref 3.5–5.2)
PROT SERPL-MCNC: 6.7 G/DL (ref 6–8.5)
RBC # BLD AUTO: 4.75 10*6/MM3 (ref 3.77–5.28)
SODIUM SERPL-SCNC: 140 MMOL/L (ref 136–145)
WBC NRBC COR # BLD AUTO: 5.03 10*3/MM3 (ref 3.4–10.8)

## 2025-06-02 PROCEDURE — 25810000003 SODIUM CHLORIDE 0.9 % SOLUTION 250 ML FLEX CONT: Performed by: PHYSICIAN ASSISTANT

## 2025-06-02 PROCEDURE — 25810000003 SODIUM CHLORIDE 0.9 % SOLUTION: Performed by: PHYSICIAN ASSISTANT

## 2025-06-02 PROCEDURE — 96413 CHEMO IV INFUSION 1 HR: CPT

## 2025-06-02 PROCEDURE — 25010000002 ADO-TRASTUZUMAB 100 MG RECONSTITUTED SOLUTION 1 EACH VIAL: Performed by: PHYSICIAN ASSISTANT

## 2025-06-02 PROCEDURE — 80053 COMPREHEN METABOLIC PANEL: CPT | Performed by: INTERNAL MEDICINE

## 2025-06-02 PROCEDURE — 25010000002 HEPARIN LOCK FLUSH PER 10 UNITS: Performed by: INTERNAL MEDICINE

## 2025-06-02 PROCEDURE — 25010000002 DEXAMETHASONE PER 1 MG: Performed by: PHYSICIAN ASSISTANT

## 2025-06-02 PROCEDURE — 96375 TX/PRO/DX INJ NEW DRUG ADDON: CPT

## 2025-06-02 PROCEDURE — 85025 COMPLETE CBC W/AUTO DIFF WBC: CPT | Performed by: INTERNAL MEDICINE

## 2025-06-02 RX ORDER — SODIUM CHLORIDE 9 MG/ML
20 INJECTION, SOLUTION INTRAVENOUS ONCE
Status: COMPLETED | OUTPATIENT
Start: 2025-06-02 | End: 2025-06-02

## 2025-06-02 RX ORDER — DEXAMETHASONE SODIUM PHOSPHATE 4 MG/ML
12 INJECTION, SOLUTION INTRA-ARTICULAR; INTRALESIONAL; INTRAMUSCULAR; INTRAVENOUS; SOFT TISSUE ONCE
Status: COMPLETED | OUTPATIENT
Start: 2025-06-02 | End: 2025-06-02

## 2025-06-02 RX ORDER — SODIUM CHLORIDE 0.9 % (FLUSH) 0.9 %
10 SYRINGE (ML) INJECTION AS NEEDED
OUTPATIENT
Start: 2025-06-02

## 2025-06-02 RX ORDER — SODIUM CHLORIDE 9 MG/ML
20 INJECTION, SOLUTION INTRAVENOUS ONCE
Status: CANCELLED | OUTPATIENT
Start: 2025-06-02

## 2025-06-02 RX ORDER — SODIUM CHLORIDE 0.9 % (FLUSH) 0.9 %
10 SYRINGE (ML) INJECTION AS NEEDED
Status: DISCONTINUED | OUTPATIENT
Start: 2025-06-02 | End: 2025-06-03 | Stop reason: HOSPADM

## 2025-06-02 RX ORDER — HEPARIN SODIUM (PORCINE) LOCK FLUSH IV SOLN 100 UNIT/ML 100 UNIT/ML
500 SOLUTION INTRAVENOUS AS NEEDED
OUTPATIENT
Start: 2025-06-02

## 2025-06-02 RX ORDER — HEPARIN SODIUM (PORCINE) LOCK FLUSH IV SOLN 100 UNIT/ML 100 UNIT/ML
500 SOLUTION INTRAVENOUS AS NEEDED
Status: DISCONTINUED | OUTPATIENT
Start: 2025-06-02 | End: 2025-06-03 | Stop reason: HOSPADM

## 2025-06-02 RX ADMIN — DEXAMETHASONE SODIUM PHOSPHATE 12 MG: 4 INJECTION, SOLUTION INTRAMUSCULAR; INTRAVENOUS at 10:50

## 2025-06-02 RX ADMIN — SODIUM CHLORIDE 285 MG: 9 INJECTION, SOLUTION INTRAVENOUS at 11:14

## 2025-06-02 RX ADMIN — HEPARIN 500 UNITS: 100 SYRINGE at 11:46

## 2025-06-02 RX ADMIN — Medication 10 ML: at 11:46

## 2025-06-02 RX ADMIN — SODIUM CHLORIDE 20 ML/HR: 9 INJECTION, SOLUTION INTRAVENOUS at 10:47

## 2025-06-02 NOTE — PROGRESS NOTES
Pt here for C5D1 Kadcyla,  reviewed labs/assessment with MERARI Velez and pt to proceed with treatment as planned,  pt reports taking restoril following treatments d/t difficulty sleeping and states she is figuring out best times/days to take.   Extra labs drawn for lab, pt seeing Dr Nazario on 6/4/25.   Reviewed with pt and v/u.  Treatment given per MAR and pt discharged and has appts on my chart.

## 2025-06-04 ENCOUNTER — OFFICE VISIT (OUTPATIENT)
Dept: ONCOLOGY | Facility: CLINIC | Age: 65
End: 2025-06-04
Payer: COMMERCIAL

## 2025-06-04 VITALS
DIASTOLIC BLOOD PRESSURE: 72 MMHG | HEART RATE: 100 BPM | HEIGHT: 64 IN | SYSTOLIC BLOOD PRESSURE: 113 MMHG | OXYGEN SATURATION: 95 % | RESPIRATION RATE: 18 BRPM | TEMPERATURE: 97.9 F | BODY MASS INDEX: 30.39 KG/M2 | WEIGHT: 178 LBS

## 2025-06-04 DIAGNOSIS — Z17.0 BILATERAL MALIGNANT NEOPLASM OF BREAST IN FEMALE, ESTROGEN RECEPTOR POSITIVE, UNSPECIFIED SITE OF BREAST: Primary | ICD-10-CM

## 2025-06-04 DIAGNOSIS — C50.911 BILATERAL MALIGNANT NEOPLASM OF BREAST IN FEMALE, ESTROGEN RECEPTOR POSITIVE, UNSPECIFIED SITE OF BREAST: Primary | ICD-10-CM

## 2025-06-04 DIAGNOSIS — C50.912 BILATERAL MALIGNANT NEOPLASM OF BREAST IN FEMALE, ESTROGEN RECEPTOR POSITIVE, UNSPECIFIED SITE OF BREAST: Primary | ICD-10-CM

## 2025-06-23 ENCOUNTER — HOSPITAL ENCOUNTER (OUTPATIENT)
Dept: ONCOLOGY | Facility: HOSPITAL | Age: 65
Discharge: HOME | End: 2025-06-23
Admitting: INTERNAL MEDICINE
Payer: COMMERCIAL

## 2025-06-23 VITALS
HEIGHT: 64 IN | HEART RATE: 99 BPM | BODY MASS INDEX: 30.19 KG/M2 | SYSTOLIC BLOOD PRESSURE: 114 MMHG | DIASTOLIC BLOOD PRESSURE: 74 MMHG | TEMPERATURE: 97.5 F | RESPIRATION RATE: 20 BRPM | WEIGHT: 176.8 LBS | OXYGEN SATURATION: 93 %

## 2025-06-23 DIAGNOSIS — C50.912 BILATERAL MALIGNANT NEOPLASM OF BREAST IN FEMALE, ESTROGEN RECEPTOR POSITIVE, UNSPECIFIED SITE OF BREAST: Primary | ICD-10-CM

## 2025-06-23 DIAGNOSIS — C50.911 BILATERAL MALIGNANT NEOPLASM OF BREAST IN FEMALE, ESTROGEN RECEPTOR POSITIVE, UNSPECIFIED SITE OF BREAST: Primary | ICD-10-CM

## 2025-06-23 DIAGNOSIS — Z17.0 BILATERAL MALIGNANT NEOPLASM OF BREAST IN FEMALE, ESTROGEN RECEPTOR POSITIVE, UNSPECIFIED SITE OF BREAST: Primary | ICD-10-CM

## 2025-06-23 DIAGNOSIS — R91.8 LUNG MASS: ICD-10-CM

## 2025-06-23 LAB
ALBUMIN SERPL-MCNC: 3.9 G/DL (ref 3.5–5.2)
ALBUMIN/GLOB SERPL: 1.4 G/DL
ALP SERPL-CCNC: 98 U/L (ref 39–117)
ALT SERPL W P-5'-P-CCNC: 23 U/L (ref 1–33)
ANION GAP SERPL CALCULATED.3IONS-SCNC: 11.3 MMOL/L (ref 5–15)
AST SERPL-CCNC: 41 U/L (ref 1–32)
BASOPHILS # BLD AUTO: 0.05 10*3/MM3 (ref 0–0.2)
BASOPHILS NFR BLD AUTO: 1.1 % (ref 0–1.5)
BILIRUB SERPL-MCNC: 0.7 MG/DL (ref 0–1.2)
BUN SERPL-MCNC: 19.5 MG/DL (ref 8–23)
BUN/CREAT SERPL: 22.2 (ref 7–25)
CALCIUM SPEC-SCNC: 9.5 MG/DL (ref 8.6–10.5)
CHLORIDE SERPL-SCNC: 106 MMOL/L (ref 98–107)
CO2 SERPL-SCNC: 24.7 MMOL/L (ref 22–29)
CREAT SERPL-MCNC: 0.88 MG/DL (ref 0.57–1)
DEPRECATED RDW RBC AUTO: 52.9 FL (ref 37–54)
EGFRCR SERPLBLD CKD-EPI 2021: 73.9 ML/MIN/1.73
EOSINOPHIL # BLD AUTO: 0.28 10*3/MM3 (ref 0–0.4)
EOSINOPHIL NFR BLD AUTO: 6 % (ref 0.3–6.2)
ERYTHROCYTE [DISTWIDTH] IN BLOOD BY AUTOMATED COUNT: 17.6 % (ref 12.3–15.4)
GLOBULIN UR ELPH-MCNC: 2.8 GM/DL
GLUCOSE SERPL-MCNC: 89 MG/DL (ref 65–99)
HCT VFR BLD AUTO: 40.6 % (ref 34–46.6)
HGB BLD-MCNC: 12.9 G/DL (ref 12–15.9)
LYMPHOCYTES # BLD AUTO: 0.83 10*3/MM3 (ref 0.7–3.1)
LYMPHOCYTES NFR BLD AUTO: 17.8 % (ref 19.6–45.3)
MCH RBC QN AUTO: 26.6 PG (ref 26.6–33)
MCHC RBC AUTO-ENTMCNC: 31.8 G/DL (ref 31.5–35.7)
MCV RBC AUTO: 83.7 FL (ref 79–97)
MONOCYTES # BLD AUTO: 0.49 10*3/MM3 (ref 0.1–0.9)
MONOCYTES NFR BLD AUTO: 10.5 % (ref 5–12)
NEUTROPHILS NFR BLD AUTO: 3.01 10*3/MM3 (ref 1.7–7)
NEUTROPHILS NFR BLD AUTO: 64.6 % (ref 42.7–76)
PLATELET # BLD AUTO: 148 10*3/MM3 (ref 140–450)
PMV BLD AUTO: 9.2 FL (ref 6–12)
POTASSIUM SERPL-SCNC: 3.7 MMOL/L (ref 3.5–5.2)
PROT SERPL-MCNC: 6.7 G/DL (ref 6–8.5)
RBC # BLD AUTO: 4.85 10*6/MM3 (ref 3.77–5.28)
SODIUM SERPL-SCNC: 142 MMOL/L (ref 136–145)
WBC NRBC COR # BLD AUTO: 4.66 10*3/MM3 (ref 3.4–10.8)

## 2025-06-23 PROCEDURE — 25810000003 SODIUM CHLORIDE 0.9 % SOLUTION: Performed by: PHYSICIAN ASSISTANT

## 2025-06-23 PROCEDURE — 25010000002 ADO-TRASTUZUMAB 100 MG RECONSTITUTED SOLUTION 1 EACH VIAL: Performed by: PHYSICIAN ASSISTANT

## 2025-06-23 PROCEDURE — 25010000002 HEPARIN LOCK FLUSH PER 10 UNITS: Performed by: INTERNAL MEDICINE

## 2025-06-23 PROCEDURE — 80053 COMPREHEN METABOLIC PANEL: CPT | Performed by: INTERNAL MEDICINE

## 2025-06-23 PROCEDURE — 25010000002 DEXAMETHASONE PER 1 MG: Performed by: PHYSICIAN ASSISTANT

## 2025-06-23 PROCEDURE — 96413 CHEMO IV INFUSION 1 HR: CPT

## 2025-06-23 PROCEDURE — 25810000003 SODIUM CHLORIDE 0.9 % SOLUTION 250 ML FLEX CONT: Performed by: PHYSICIAN ASSISTANT

## 2025-06-23 PROCEDURE — 96375 TX/PRO/DX INJ NEW DRUG ADDON: CPT

## 2025-06-23 PROCEDURE — 85025 COMPLETE CBC W/AUTO DIFF WBC: CPT | Performed by: INTERNAL MEDICINE

## 2025-06-23 RX ORDER — HEPARIN SODIUM (PORCINE) LOCK FLUSH IV SOLN 100 UNIT/ML 100 UNIT/ML
500 SOLUTION INTRAVENOUS AS NEEDED
Status: DISCONTINUED | OUTPATIENT
Start: 2025-06-23 | End: 2025-06-24 | Stop reason: HOSPADM

## 2025-06-23 RX ORDER — HEPARIN SODIUM (PORCINE) LOCK FLUSH IV SOLN 100 UNIT/ML 100 UNIT/ML
500 SOLUTION INTRAVENOUS AS NEEDED
OUTPATIENT
Start: 2025-06-23

## 2025-06-23 RX ORDER — SODIUM CHLORIDE 0.9 % (FLUSH) 0.9 %
10 SYRINGE (ML) INJECTION AS NEEDED
Status: DISCONTINUED | OUTPATIENT
Start: 2025-06-23 | End: 2025-06-24 | Stop reason: HOSPADM

## 2025-06-23 RX ORDER — DEXAMETHASONE SODIUM PHOSPHATE 4 MG/ML
12 INJECTION, SOLUTION INTRA-ARTICULAR; INTRALESIONAL; INTRAMUSCULAR; INTRAVENOUS; SOFT TISSUE ONCE
Status: COMPLETED | OUTPATIENT
Start: 2025-06-23 | End: 2025-06-23

## 2025-06-23 RX ORDER — SODIUM CHLORIDE 0.9 % (FLUSH) 0.9 %
10 SYRINGE (ML) INJECTION AS NEEDED
OUTPATIENT
Start: 2025-06-23

## 2025-06-23 RX ORDER — SODIUM CHLORIDE 9 MG/ML
20 INJECTION, SOLUTION INTRAVENOUS ONCE
Status: CANCELLED | OUTPATIENT
Start: 2025-06-23

## 2025-06-23 RX ORDER — SODIUM CHLORIDE 9 MG/ML
20 INJECTION, SOLUTION INTRAVENOUS ONCE
Status: COMPLETED | OUTPATIENT
Start: 2025-06-23 | End: 2025-06-23

## 2025-06-23 RX ADMIN — Medication 500 UNITS: at 11:20

## 2025-06-23 RX ADMIN — Medication 10 ML: at 11:20

## 2025-06-23 RX ADMIN — ADO-TRASTUZUMAB EMTANSINE 285 MG: 20 INJECTION, POWDER, LYOPHILIZED, FOR SOLUTION INTRAVENOUS at 10:44

## 2025-06-23 RX ADMIN — DEXAMETHASONE SODIUM PHOSPHATE 12 MG: 4 INJECTION, SOLUTION INTRAMUSCULAR; INTRAVENOUS at 10:27

## 2025-06-23 RX ADMIN — SODIUM CHLORIDE 20 ML/HR: 9 INJECTION, SOLUTION INTRAVENOUS at 10:25

## 2025-07-02 ENCOUNTER — HOSPITAL ENCOUNTER (OUTPATIENT)
Dept: ONCOLOGY | Facility: HOSPITAL | Age: 65
Discharge: HOME | End: 2025-07-02
Payer: COMMERCIAL

## 2025-07-07 ENCOUNTER — TELEPHONE (OUTPATIENT)
Dept: ONCOLOGY | Facility: CLINIC | Age: 65
End: 2025-07-07
Payer: COMMERCIAL

## 2025-07-07 NOTE — TELEPHONE ENCOUNTER
"  Caller: Tracy Reddy \"Radha\"    Relationship: Self    Best call back number:   Telephone Information:   Mobile [phone]       What was the call regarding: NEEDS TO R/S 7/2 F/U AND LAB PLEASE CB TO ADVISE, WILL TAKE 1ST AVAIL CAN LEAVE DETAILED VM.   "

## 2025-07-09 NOTE — PROGRESS NOTES
HEMATOLOGY ONCOLOGY OUTPATIENT FOLLOW UP       Patient name: Tracy Reddy  : 1960  MRN: [id number]  Primary Care Physician: Provider, No Known  Referring Physician: Provider, No Known  Reason For Consult: breast cancer      History of Present Illness:    Tracy Reddy is a 64 y.o. female who presented to [facility] on 4/3/2024 with complaints of cough.  She initially presented to [facility] ED with complaints of fever up to 103 °F, productive cough with green and brown sputum, shortness of air and chest tightness that has been ongoing for several days.  She reports she has family members that have been sick at home.  She reports she has also had a history of PE in the past.  Labs show troponin was 13 x 2, .8.  CBC unremarkable.  Respiratory panel was negative. Chest x-ray showed masslike opacity within the right lower lobe.  Sputum cultures and blood cultures have been ordered.  She is a non-smoker.  Pulmonology also has been consulted.     24 CT chest angiogram  Impression:  1. No evidence of pulmonary embolism.  2. No acute cardiopulmonary process. There is a mass present within the right lower lobe likely representing malignancy. There is enlarged right hilar adenopathy likely representing local metastatic disease. No previous imaging available for comparison.        24  Hematology/Oncology was consulted.       From record review: Patient has a history of bilateral ductal carcinoma diagnosed in .  Right breast IDC, G1, ER positive, MO positive, HER-2 negative. Left breast IDC, G2, ER positive, MO positive, HER-2 negative. Left SLND revealed 1 of 2 lymph nodes positive for macro metastasis. She is status post bilateral mastectomy with reconstruction in .  She is also completed neoadjuvant chemotherapy with 6 cycles of TCHP.  Taxotere and carboplatin with dose reductions at cycle 2 secondary to chemotherapy-induced nausea and vomiting.  Completed adjuvant  Herceptin Perjeta for 1 year.  Status post radiation therapy left chest wall and regional nodes, 5 fractions for a total dose of 5000 cGray. Current treatment with hormone blockade with Arimidex.  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  She follows at [facility], reviewed most recent office note from 3/11/2024.     4/5/2024 status post bronchoscopy.  EBUS guided FNA of the right lower lobe hilar mass confirmed metastatic ductal carcinoma of the breast ER +100% NE +10%, HER2 nu pending  Respiratory panel positive for human metapneumovirus.    4/12/2024 PET/CT with 1.1 cm lesion in the region of the left vallecula concerning for second primary malignancy.  Recommend correlation with direct visualization, large right hilar mass consistent with malignancy, no evidence of metastatic disease elsewhere    Path with HER 2 positive 3+ by IHC, guardant NGS with PIK3CA     5/13/24 - started THP  6/3/24 - C2  6/24/24 - C3  7/15/2024: Patient received cycle 4 chemotherapy with Taxotere Herceptin Perjeta  8/5/2024: Patient received cycle 5 Taxotere Herceptin Perjeta      7/12/24 - CT imaging - There is decrease in size of the right hilar mass compared to the prior examination. This now measures approximately 3.0 x 1.5 cm, previously measured 4.2 x 3.4 cm using similar measurement technique.     -9/6/2024 patient had a PET CT scan there is a dramatic interval decrease in the size of the right lower lobe infrahilar mass when compared to previous PET scan.  There is no longer any visible mass or abnormal metabolic activity in this location.  There may be residual scarring in that location.  There is an uptake within the right paratracheal lymph node measuring 1.4 cm SUV 5.6 suggesting metastatic disease previously measured 0.8 cm.  No additional areas of abnormal metabolic activity was seen.  No other evidence of metastatic disease was seen    Palliative XRT today right lung mass ongoing under the care of  Dr. Wilburn  3/7/2025: Patient was initiated on single agent Kadcyla  3/31/2025: Patient received cycle 2 of Kadcyla  4/21/2025: Patient received cycle 3 of Kadcyla       Subjective:    She denies any new issues.  Energy level has improved also cough has resolved.  Overall she is tolerating treatments fairly well      Denies any new issues.  She is here today for routine follow-up.      Past Medical History:   Diagnosis Date    Cancer        Past Surgical History:   Procedure Laterality Date    BRONCHOSCOPY N/A 4/5/2024    Procedure: BRONCHOSCOPY WITH ENDOBRONCHIAL ULTRASOUND, bilateral bronchial washings, fine needle aspiration x 2 areas;  Surgeon: Edy Irving MD;  Location: [facility] ENDOSCOPY;  Service: Pulmonary;  Laterality: N/A;  post: lung mass    MASTECTOMY           Current Outpatient Medications:     Calcium Carb-Cholecalciferol (Calcium + Vitamin D3) 500-5 MG-MCG tablet per tablet, Take 1 tablet by mouth 2 (Two) Times a Day., Disp: 60 tablet, Rfl: 6    cetirizine (zyrTEC) 10 MG tablet, Take 1 tablet by mouth Daily., Disp: , Rfl:     letrozole (FEMARA) 2.5 MG tablet, TAKE 1 TABLET BY MOUTH EVERY DAY, Disp: 90 tablet, Rfl: 2    temazepam (RESTORIL) 7.5 MG capsule, Take 1 capsule by mouth At Night As Needed for Sleep., Disp: 30 capsule, Rfl: 0    Calcium Citrate-Vitamin D (Citrus Calcium/Vitamin D) 200-6.25 MG-MCG tablet, Take  by mouth. (Patient not taking: Reported on 7/11/2025), Disp: , Rfl:     omeprazole (priLOSEC) 20 MG capsule, Take 1 capsule by mouth Daily. (Patient not taking: Reported on 7/11/2025), Disp: 30 capsule, Rfl: 5    ondansetron (ZOFRAN) 8 MG tablet, Take 1 tablet by mouth 3 (Three) Times a Day As Needed for Nausea or Vomiting. (Patient not taking: Reported on 7/11/2025), Disp: 30 tablet, Rfl: 5    Allergies   Allergen Reactions    Penicillins Rash       History reviewed. No pertinent family history.    Cancer-related family history is not on file.    Social History     Tobacco Use     "Smoking status: Never     Passive exposure: Never    Smokeless tobacco: Never   Vaping Use    Vaping status: Never Used   Substance Use Topics    Alcohol use: Never    Drug use: Never     Social History     Social History Narrative    Not on file      ROS:   Review of Systems   Constitutional:  Negative for chills, fatigue and fever.   HENT:  Negative for congestion and nosebleeds.    Eyes:  Negative for pain and itching.   Respiratory:  Negative for cough and shortness of breath.    Cardiovascular:  Negative for chest pain.   Gastrointestinal:  Negative for abdominal pain, blood in stool, diarrhea, nausea and vomiting.   Endocrine: Negative for cold intolerance and heat intolerance.   Genitourinary:  Negative for difficulty urinating.   Musculoskeletal:  Negative for arthralgias.   Skin:  Negative for rash.   Neurological:  Negative for dizziness and headaches.   Hematological:  Does not bruise/bleed easily.   Psychiatric/Behavioral:  Negative for behavioral problems.        Objective:  Vital signs:  Vitals:    07/11/25 1213   BP: 111/67   Pulse: 95   Temp: 97.2 °F (36.2 °C)   TempSrc: Oral   SpO2: 93%   Weight: 80 kg (176 lb 6.4 oz)   Height: 162.6 cm (64.02\")   PainSc: 0-No pain               Body mass index is 30.26 kg/m².  ECOG  (0) Fully active, able to carry on all predisease performance without restriction    Physical Exam:   Physical Exam  Constitutional:       Appearance: Normal appearance.   HENT:      Head: Normocephalic and atraumatic.   Eyes:      Pupils: Pupils are equal, round, and reactive to light.   Cardiovascular:      Rate and Rhythm: Normal rate and regular rhythm.      Pulses: Normal pulses.      Heart sounds: Normal heart sounds. No murmur heard.  Pulmonary:      Effort: Pulmonary effort is normal.      Breath sounds: Normal breath sounds.   Abdominal:      General: There is no distension.      Palpations: Abdomen is soft. There is no mass.      Tenderness: There is no abdominal tenderness. " "  Musculoskeletal:         General: Normal range of motion.      Cervical back: Normal range of motion and neck supple.   Skin:     General: Skin is warm.   Neurological:      General: No focal deficit present.      Mental Status: She is alert.   Psychiatric:         Mood and Affect: Mood normal.     I have reexamined the patient and the results are consistent with the previously documented exam. Cadence Nazario MD     Lab Results - Last 18 Months   Lab Units 07/11/25  1223 06/23/25  0905 06/02/25  0929   WBC 10*3/mm3 5.12 4.66 5.03   HEMOGLOBIN g/dL 13.5 12.9 12.5   HEMATOCRIT % 43.4 40.6 40.1   PLATELETS 10*3/mm3 133* 148 163   MCV fL 83.5 83.7 84.4     Lab Results - Last 18 Months   Lab Units 06/23/25  0905 06/02/25  0929 05/12/25  0927 03/31/25  0907 03/06/25  0923   SODIUM mmol/L 142 140  --   --  141   POTASSIUM mmol/L 3.7 3.9  --   --  3.8   CHLORIDE mmol/L 106 103  --   --  104   CO2 mmol/L 24.7 25.5  --   --  23.7   BUN mg/dL 19.5 18.3  --   --  19   CREATININE mg/dL 0.88 0.89 1.00   < > 1.00   CALCIUM mg/dL 9.5 9.7  --   --  9.5   BILIRUBIN mg/dL 0.7 0.6  --   --  0.6   ALK PHOS U/L 98 94  --   --  91   ALT (SGPT) U/L 23 32  --   --  11   AST (SGOT) U/L 41* 45*  --   --  26   GLUCOSE mg/dL 89 94  --   --  100*    < > = values in this interval not displayed.       Lab Results   Component Value Date    GLUCOSE 89 06/23/2025    BUN 19.5 06/23/2025    CREATININE 0.88 06/23/2025    EGFRIFNONA 57 (L) 11/10/2021    EGFRIFAFRI 66 11/10/2021    BCR 22.2 06/23/2025    K 3.7 06/23/2025    CO2 24.7 06/23/2025    CALCIUM 9.5 06/23/2025    ALBUMIN 3.9 06/23/2025    AST 41 (H) 06/23/2025    ALT 23 06/23/2025       Lab Results - Last 18 Months   Lab Units 04/05/24  0415   INR  0.99       No results found for: \"IRON\", \"TIBC\", \"FERRITIN\"    No results found for: \"FOLATE\"    No results found for: \"OCCULTBLD\"    No results found for: \"RETICCTPCT\"  No results found for: \"GXOPBXNZ20\"  No results found for: \"SPEP\", " "\"UPEP\"  No results found for: \"LDH\", \"URICACID\"  No results found for: \"BABAK\", \"RF\", \"SEDRATE\"  No results found for: \"FIBRINOGEN\", \"HAPTOGLOBIN\"  Lab Results   Component Value Date    INR 0.99 04/05/2024     No results found for: \"\"  No results found for: \"CEA\"  No components found for: \"CA-19-9\"  No results found for: \"PSA\"  No results found for: \"SEDRATE\"      Assessment & Plan     Tracy Reddy is a 63 y.o. female with history of pulmonary embolism and bilateral invasive ductal carcinoma status post bilateral mastectomy, chemotherapy and radiation treatments, on Arimidex.  She presented with dyspnea and was found to have a lung mass in the right lower lobe with right hilar and suprahilar lymphadenopathy.     Right lung mass-metastatic ductal carcinoma of the breast -recurrent, ER/AK positive HER2/nancy 3+ on immunohistochemistry    CT imaging showed mass present within the medial right lower lobe measuring up to 3.2 x 4.1 cm that extends to the right hilar region.  Additional conglomerate of nodes present within right hilar to right suprahilar region measuring up to 2.7 x 1.8 cm.  Status post bronchoscopy EBUS 4/5/2024.    Biopsy confirms metastatic ductal carcinoma of the breast ER/AK positive, HER2 nu pending.  Further treatment will depend on HER2/nancy status.  Will also send tumor sample out for [facility] NGS testing.  She is on Arimidex right now but will need to switch to an alternate AI or a different agent given mutational analysis findings.  If HER2 positive disease, will use HER2 blockade.  Given there is no other clear areas of metastasis.  We can still consider curative treatment with either radio-ablation or surgical resection.    Discussed at tumor board. Not a surgical candidate based on tumor location. Radiation possible. Will plan on starting chemo with herceptin perjeta and then consider radiation after 6 cycles.    NGS testing with guardant showing PIK3CA.  See report    Continue herceptin " perjeta maintenance    Started treatment tolerating well has some side effects as above not significant to need any dose modification  CT imaging now with good response. Reviewed images independently and showed the patient. Reviewed labs, and notes from other providers.ordered labs    Our plan would be to continue 3 more cycles of Taxotere, Herceptin and Perjeta and then discuss at tumor board again about consolidation with surgery vs radiation. Initial thought was that radiation would be preferable given location.  Continue treatment no changes.    She has completed planned 6 cycles of Taxotere, Herceptin and Perjeta        She will continue letrozole 2.5 mg p.o. daily     She will continue  Kadcyla      She is now receiving Kadcyla initiated on 3/1/2025    Continue the same     Restaging CT scans overall remains stable. Next scheduled August 2025      Insomnia  - complains more with steroids  Takes melatonin does not work effectively therefore we will begin Restoril 7.5 mg nightly as needed for insomnia    She will continue Restoril prn  Reviewed.        History of bilateral invasive ductal carcinoma    -Diagnosed in 2020. She is status post bilateral mastectomy with reconstruction in 2021.  She is also completed neoadjuvant chemotherapy with 6 cycles of TCHP.  Taxotere and carboplatin with dose reductions at cycle 2 secondary to chemotherapy-induced nausea and vomiting.  There is some confusion about her pathology as some reports show HER2 negative, some show positive.  However she did receive adjuvant Herceptin Perjeta maintenance which likely points towards HER2 positive disease.  She is also known to have CHEK2 mutation.  Completed adjuvant Herceptin Perjeta for 1 year.  Status post radiation therapy left chest wall and regional nodes, 5 fractions for a total dose of 5000 cGray. Current treatment with hormone blockade with Arimidex. She followed at [facility] [place] has transferred care to us  now         Pneumonia  This has resolved    Osteopenia  DEXA with osteopenia. continue calcium vitamin D.  Continue  Dexa scan is due November 2025    Rash  Likely herceptin related  Lasted for a week   Recommend hydrocortisone topical   Resolved    Neutropenia due to chemotherapy    Neutropenic precautions  Add Neulasta with cycle #6  Resolved    Diarrhea   Mild      Needs 2 D echo q 3 months for her 2 directed therapy.  2D echo will be due 8/22/2025. Ordered today        Continue treatment  On chemotherapy requiring intensive monitoring.  Follow-up in 3 weeks    Reviewed PET scan with patient in detail          FU in 4 weeks.        Electronically signed by Cadence Nazario MD, 07/11/25, 2:45 PM EDT.

## 2025-07-11 ENCOUNTER — OFFICE VISIT (OUTPATIENT)
Dept: ONCOLOGY | Facility: CLINIC | Age: 65
End: 2025-07-11
Payer: COMMERCIAL

## 2025-07-11 ENCOUNTER — LAB (OUTPATIENT)
Dept: LAB | Facility: HOSPITAL | Age: 65
End: 2025-07-11
Payer: COMMERCIAL

## 2025-07-11 VITALS
HEART RATE: 95 BPM | WEIGHT: 176.4 LBS | DIASTOLIC BLOOD PRESSURE: 67 MMHG | BODY MASS INDEX: 30.11 KG/M2 | OXYGEN SATURATION: 93 % | SYSTOLIC BLOOD PRESSURE: 111 MMHG | HEIGHT: 64 IN | TEMPERATURE: 97.2 F

## 2025-07-11 DIAGNOSIS — C50.912 BILATERAL MALIGNANT NEOPLASM OF BREAST IN FEMALE, ESTROGEN RECEPTOR POSITIVE, UNSPECIFIED SITE OF BREAST: Primary | ICD-10-CM

## 2025-07-11 DIAGNOSIS — Z51.81 ENCOUNTER FOR MONITORING CARDIOTOXIC DRUG THERAPY: ICD-10-CM

## 2025-07-11 DIAGNOSIS — Z79.899 ENCOUNTER FOR MONITORING CARDIOTOXIC DRUG THERAPY: ICD-10-CM

## 2025-07-11 DIAGNOSIS — C50.911 BILATERAL MALIGNANT NEOPLASM OF BREAST IN FEMALE, ESTROGEN RECEPTOR POSITIVE, UNSPECIFIED SITE OF BREAST: Primary | ICD-10-CM

## 2025-07-11 DIAGNOSIS — Z17.0 BILATERAL MALIGNANT NEOPLASM OF BREAST IN FEMALE, ESTROGEN RECEPTOR POSITIVE, UNSPECIFIED SITE OF BREAST: Primary | ICD-10-CM

## 2025-07-11 LAB
BASOPHILS # BLD AUTO: 0.03 10*3/MM3 (ref 0–0.2)
BASOPHILS NFR BLD AUTO: 0.6 % (ref 0–1.5)
DEPRECATED RDW RBC AUTO: 53 FL (ref 37–54)
EOSINOPHIL # BLD AUTO: 0.25 10*3/MM3 (ref 0–0.4)
EOSINOPHIL NFR BLD AUTO: 4.9 % (ref 0.3–6.2)
ERYTHROCYTE [DISTWIDTH] IN BLOOD BY AUTOMATED COUNT: 17.4 % (ref 12.3–15.4)
HCT VFR BLD AUTO: 43.4 % (ref 34–46.6)
HGB BLD-MCNC: 13.5 G/DL (ref 12–15.9)
HOLD SPECIMEN: NORMAL
HOLD SPECIMEN: NORMAL
IMM GRANULOCYTES # BLD AUTO: ABNORMAL 10*3/UL
IMM GRANULOCYTES NFR BLD AUTO: ABNORMAL %
LYMPHOCYTES # BLD AUTO: 0.95 10*3/MM3 (ref 0.7–3.1)
LYMPHOCYTES NFR BLD AUTO: 18.6 % (ref 19.6–45.3)
MCH RBC QN AUTO: 26 PG (ref 26.6–33)
MCHC RBC AUTO-ENTMCNC: 31.1 G/DL (ref 31.5–35.7)
MCV RBC AUTO: 83.5 FL (ref 79–97)
MONOCYTES # BLD AUTO: 0.58 10*3/MM3 (ref 0.1–0.9)
MONOCYTES NFR BLD AUTO: 11.3 % (ref 5–12)
NEUTROPHILS NFR BLD AUTO: 3.31 10*3/MM3 (ref 1.7–7)
NEUTROPHILS NFR BLD AUTO: 64.6 % (ref 42.7–76)
PLATELET # BLD AUTO: 133 10*3/MM3 (ref 140–450)
PMV BLD AUTO: 9.6 FL (ref 6–12)
RBC # BLD AUTO: 5.2 10*6/MM3 (ref 3.77–5.28)
WBC NRBC COR # BLD AUTO: 5.12 10*3/MM3 (ref 3.4–10.8)

## 2025-07-11 PROCEDURE — 85025 COMPLETE CBC W/AUTO DIFF WBC: CPT

## 2025-07-11 PROCEDURE — 36415 COLL VENOUS BLD VENIPUNCTURE: CPT

## 2025-07-14 ENCOUNTER — HOSPITAL ENCOUNTER (OUTPATIENT)
Dept: ONCOLOGY | Facility: HOSPITAL | Age: 65
Discharge: HOME | End: 2025-07-14
Admitting: INTERNAL MEDICINE
Payer: COMMERCIAL

## 2025-07-14 VITALS
TEMPERATURE: 97.8 F | WEIGHT: 176.8 LBS | OXYGEN SATURATION: 94 % | HEIGHT: 64 IN | SYSTOLIC BLOOD PRESSURE: 115 MMHG | DIASTOLIC BLOOD PRESSURE: 75 MMHG | BODY MASS INDEX: 30.19 KG/M2 | HEART RATE: 101 BPM | RESPIRATION RATE: 18 BRPM

## 2025-07-14 DIAGNOSIS — R91.8 LUNG MASS: ICD-10-CM

## 2025-07-14 DIAGNOSIS — C50.911 BILATERAL MALIGNANT NEOPLASM OF BREAST IN FEMALE, ESTROGEN RECEPTOR POSITIVE, UNSPECIFIED SITE OF BREAST: ICD-10-CM

## 2025-07-14 DIAGNOSIS — Z17.0 BILATERAL MALIGNANT NEOPLASM OF BREAST IN FEMALE, ESTROGEN RECEPTOR POSITIVE, UNSPECIFIED SITE OF BREAST: Primary | ICD-10-CM

## 2025-07-14 DIAGNOSIS — C77.1 BREAST CANCER METASTASIZED TO INTRATHORACIC LYMPH NODE, RIGHT: Primary | ICD-10-CM

## 2025-07-14 DIAGNOSIS — C50.911 BILATERAL MALIGNANT NEOPLASM OF BREAST IN FEMALE, ESTROGEN RECEPTOR POSITIVE, UNSPECIFIED SITE OF BREAST: Primary | ICD-10-CM

## 2025-07-14 DIAGNOSIS — C50.911 BREAST CANCER METASTASIZED TO INTRATHORACIC LYMPH NODE, RIGHT: Primary | ICD-10-CM

## 2025-07-14 DIAGNOSIS — Z17.0 BILATERAL MALIGNANT NEOPLASM OF BREAST IN FEMALE, ESTROGEN RECEPTOR POSITIVE, UNSPECIFIED SITE OF BREAST: ICD-10-CM

## 2025-07-14 DIAGNOSIS — C50.912 BILATERAL MALIGNANT NEOPLASM OF BREAST IN FEMALE, ESTROGEN RECEPTOR POSITIVE, UNSPECIFIED SITE OF BREAST: Primary | ICD-10-CM

## 2025-07-14 DIAGNOSIS — C50.912 BILATERAL MALIGNANT NEOPLASM OF BREAST IN FEMALE, ESTROGEN RECEPTOR POSITIVE, UNSPECIFIED SITE OF BREAST: ICD-10-CM

## 2025-07-14 LAB
ALP BLD-CCNC: 96 U/L (ref 42–141)
BASOPHILS # BLD AUTO: 0.03 10*3/MM3 (ref 0–0.2)
BASOPHILS NFR BLD AUTO: 0.5 % (ref 0–1.5)
BUN BLDA-MCNC: 20 MG/DL (ref 7–22)
CALCIUM BLD QL: 9.5 MG/DL (ref 8–10.3)
CHLORIDE BLDA-SCNC: 109 MMOL/L (ref 98–108)
CO2 BLDA-SCNC: 28 MMOL/L (ref 18–33)
CREAT BLDA-MCNC: 1 MG/DL (ref 0.6–1.2)
DEPRECATED RDW RBC AUTO: 51.5 FL (ref 37–54)
EGFRCR SERPLBLD CKD-EPI 2021: 63.4 ML/MIN/1.73
EOSINOPHIL # BLD AUTO: 0.24 10*3/MM3 (ref 0–0.4)
EOSINOPHIL NFR BLD AUTO: 4.3 % (ref 0.3–6.2)
ERYTHROCYTE [DISTWIDTH] IN BLOOD BY AUTOMATED COUNT: 16.9 % (ref 12.3–15.4)
GLUCOSE BLDC GLUCOMTR-MCNC: 100 MG/DL (ref 73–118)
HCT VFR BLD AUTO: 38.6 % (ref 34–46.6)
HGB BLD-MCNC: 12.5 G/DL (ref 12–15.9)
IMM GRANULOCYTES # BLD AUTO: 0.01 10*3/MM3 (ref 0–0.05)
IMM GRANULOCYTES NFR BLD AUTO: 0.2 % (ref 0–0.5)
LYMPHOCYTES # BLD AUTO: 0.78 10*3/MM3 (ref 0.7–3.1)
LYMPHOCYTES NFR BLD AUTO: 14.1 % (ref 19.6–45.3)
MCH RBC QN AUTO: 26.5 PG (ref 26.6–33)
MCHC RBC AUTO-ENTMCNC: 32.4 G/DL (ref 31.5–35.7)
MCV RBC AUTO: 81.8 FL (ref 79–97)
MONOCYTES # BLD AUTO: 0.5 10*3/MM3 (ref 0.1–0.9)
MONOCYTES NFR BLD AUTO: 9 % (ref 5–12)
NEUTROPHILS NFR BLD AUTO: 3.97 10*3/MM3 (ref 1.7–7)
NEUTROPHILS NFR BLD AUTO: 71.9 % (ref 42.7–76)
PLATELET # BLD AUTO: 133 10*3/MM3 (ref 140–450)
PMV BLD AUTO: 9.2 FL (ref 6–12)
POC ALBUMIN: 3.6 G/L (ref 3.3–5.5)
POC ALT (SGPT): 24 U/L (ref 10–47)
POC AST (SGOT): 38 U/L (ref 11–38)
POC TOTAL BILIRUBIN: 0.9 MG/DL (ref 0.2–1.6)
POC TOTAL PROTEIN: 7.2 G/DL (ref 6.4–8.1)
POTASSIUM BLDA-SCNC: 3.8 MMOL/L (ref 3.6–5.1)
RBC # BLD AUTO: 4.72 10*6/MM3 (ref 3.77–5.28)
SODIUM BLD-SCNC: 147 MMOL/L (ref 128–145)
WBC NRBC COR # BLD AUTO: 5.53 10*3/MM3 (ref 3.4–10.8)

## 2025-07-14 PROCEDURE — 25010000002 HEPARIN LOCK FLUSH PER 10 UNITS: Performed by: INTERNAL MEDICINE

## 2025-07-14 PROCEDURE — 25810000003 SODIUM CHLORIDE 0.9 % SOLUTION 250 ML FLEX CONT: Performed by: PHYSICIAN ASSISTANT

## 2025-07-14 PROCEDURE — 96413 CHEMO IV INFUSION 1 HR: CPT

## 2025-07-14 PROCEDURE — 80053 COMPREHEN METABOLIC PANEL: CPT

## 2025-07-14 PROCEDURE — 85025 COMPLETE CBC W/AUTO DIFF WBC: CPT | Performed by: INTERNAL MEDICINE

## 2025-07-14 PROCEDURE — 96375 TX/PRO/DX INJ NEW DRUG ADDON: CPT

## 2025-07-14 PROCEDURE — 25010000002 ADO-TRASTUZUMAB 100 MG RECONSTITUTED SOLUTION 1 EACH VIAL: Performed by: PHYSICIAN ASSISTANT

## 2025-07-14 PROCEDURE — 25810000003 SODIUM CHLORIDE 0.9 % SOLUTION: Performed by: PHYSICIAN ASSISTANT

## 2025-07-14 PROCEDURE — 25010000002 DEXAMETHASONE PER 1 MG: Performed by: PHYSICIAN ASSISTANT

## 2025-07-14 RX ORDER — SODIUM CHLORIDE 0.9 % (FLUSH) 0.9 %
10 SYRINGE (ML) INJECTION AS NEEDED
Status: DISCONTINUED | OUTPATIENT
Start: 2025-07-14 | End: 2025-07-15 | Stop reason: HOSPADM

## 2025-07-14 RX ORDER — HEPARIN SODIUM (PORCINE) LOCK FLUSH IV SOLN 100 UNIT/ML 100 UNIT/ML
500 SOLUTION INTRAVENOUS AS NEEDED
Status: DISCONTINUED | OUTPATIENT
Start: 2025-07-14 | End: 2025-07-15 | Stop reason: HOSPADM

## 2025-07-14 RX ORDER — SODIUM CHLORIDE 0.9 % (FLUSH) 0.9 %
10 SYRINGE (ML) INJECTION AS NEEDED
OUTPATIENT
Start: 2025-07-14

## 2025-07-14 RX ORDER — SODIUM CHLORIDE 9 MG/ML
20 INJECTION, SOLUTION INTRAVENOUS ONCE
Status: COMPLETED | OUTPATIENT
Start: 2025-07-14 | End: 2025-07-14

## 2025-07-14 RX ORDER — HEPARIN SODIUM (PORCINE) LOCK FLUSH IV SOLN 100 UNIT/ML 100 UNIT/ML
500 SOLUTION INTRAVENOUS AS NEEDED
OUTPATIENT
Start: 2025-07-14

## 2025-07-14 RX ORDER — DEXAMETHASONE SODIUM PHOSPHATE 4 MG/ML
12 INJECTION, SOLUTION INTRA-ARTICULAR; INTRALESIONAL; INTRAMUSCULAR; INTRAVENOUS; SOFT TISSUE ONCE
Status: COMPLETED | OUTPATIENT
Start: 2025-07-14 | End: 2025-07-14

## 2025-07-14 RX ORDER — SODIUM CHLORIDE 9 MG/ML
20 INJECTION, SOLUTION INTRAVENOUS ONCE
Status: CANCELLED | OUTPATIENT
Start: 2025-07-14

## 2025-07-14 RX ADMIN — DEXAMETHASONE SODIUM PHOSPHATE 12 MG: 4 INJECTION, SOLUTION INTRAMUSCULAR; INTRAVENOUS at 11:15

## 2025-07-14 RX ADMIN — Medication 10 ML: at 12:15

## 2025-07-14 RX ADMIN — SODIUM CHLORIDE 20 ML/HR: 9 INJECTION, SOLUTION INTRAVENOUS at 11:15

## 2025-07-14 RX ADMIN — HEPARIN 500 UNITS: 100 SYRINGE at 12:15

## 2025-07-14 RX ADMIN — ADO-TRASTUZUMAB EMTANSINE 285 MG: 20 INJECTION, POWDER, LYOPHILIZED, FOR SOLUTION INTRAVENOUS at 11:20

## 2025-08-04 ENCOUNTER — HOSPITAL ENCOUNTER (OUTPATIENT)
Dept: ONCOLOGY | Facility: HOSPITAL | Age: 65
Discharge: HOME | End: 2025-08-04
Admitting: INTERNAL MEDICINE
Payer: COMMERCIAL

## 2025-08-04 VITALS
OXYGEN SATURATION: 95 % | HEART RATE: 99 BPM | BODY MASS INDEX: 30.08 KG/M2 | WEIGHT: 176.2 LBS | SYSTOLIC BLOOD PRESSURE: 116 MMHG | TEMPERATURE: 97.3 F | RESPIRATION RATE: 18 BRPM | HEIGHT: 64 IN | DIASTOLIC BLOOD PRESSURE: 79 MMHG

## 2025-08-04 DIAGNOSIS — Z17.0 BILATERAL MALIGNANT NEOPLASM OF BREAST IN FEMALE, ESTROGEN RECEPTOR POSITIVE, UNSPECIFIED SITE OF BREAST: Primary | ICD-10-CM

## 2025-08-04 DIAGNOSIS — R91.8 LUNG MASS: ICD-10-CM

## 2025-08-04 DIAGNOSIS — C50.912 BILATERAL MALIGNANT NEOPLASM OF BREAST IN FEMALE, ESTROGEN RECEPTOR POSITIVE, UNSPECIFIED SITE OF BREAST: Primary | ICD-10-CM

## 2025-08-04 DIAGNOSIS — C50.911 BILATERAL MALIGNANT NEOPLASM OF BREAST IN FEMALE, ESTROGEN RECEPTOR POSITIVE, UNSPECIFIED SITE OF BREAST: Primary | ICD-10-CM

## 2025-08-04 LAB
ALBUMIN SERPL-MCNC: 4.1 G/DL (ref 3.5–5.2)
ALBUMIN/GLOB SERPL: 1.5 G/DL
ALP SERPL-CCNC: 106 U/L (ref 39–117)
ALT SERPL W P-5'-P-CCNC: 16 U/L (ref 1–33)
ANION GAP SERPL CALCULATED.3IONS-SCNC: 13.9 MMOL/L (ref 5–15)
AST SERPL-CCNC: 37 U/L (ref 1–32)
BASOPHILS # BLD AUTO: 0.04 10*3/MM3 (ref 0–0.2)
BASOPHILS NFR BLD AUTO: 0.7 % (ref 0–1.5)
BILIRUB SERPL-MCNC: 0.8 MG/DL (ref 0–1.2)
BUN SERPL-MCNC: 20.6 MG/DL (ref 8–23)
BUN/CREAT SERPL: 23.1 (ref 7–25)
CALCIUM SPEC-SCNC: 9.4 MG/DL (ref 8.6–10.5)
CHLORIDE SERPL-SCNC: 105 MMOL/L (ref 98–107)
CO2 SERPL-SCNC: 23.1 MMOL/L (ref 22–29)
CREAT SERPL-MCNC: 0.89 MG/DL (ref 0.57–1)
DEPRECATED RDW RBC AUTO: 51.6 FL (ref 37–54)
EGFRCR SERPLBLD CKD-EPI 2021: 73 ML/MIN/1.73
EOSINOPHIL # BLD AUTO: 0.28 10*3/MM3 (ref 0–0.4)
EOSINOPHIL NFR BLD AUTO: 4.8 % (ref 0.3–6.2)
ERYTHROCYTE [DISTWIDTH] IN BLOOD BY AUTOMATED COUNT: 17 % (ref 12.3–15.4)
GLOBULIN UR ELPH-MCNC: 2.7 GM/DL
GLUCOSE SERPL-MCNC: 87 MG/DL (ref 65–99)
HCT VFR BLD AUTO: 39 % (ref 34–46.6)
HGB BLD-MCNC: 12.4 G/DL (ref 12–15.9)
IMM GRANULOCYTES # BLD AUTO: 0.01 10*3/MM3 (ref 0–0.05)
IMM GRANULOCYTES NFR BLD AUTO: 0.2 % (ref 0–0.5)
LYMPHOCYTES # BLD AUTO: 1.08 10*3/MM3 (ref 0.7–3.1)
LYMPHOCYTES NFR BLD AUTO: 18.6 % (ref 19.6–45.3)
MCH RBC QN AUTO: 26 PG (ref 26.6–33)
MCHC RBC AUTO-ENTMCNC: 31.8 G/DL (ref 31.5–35.7)
MCV RBC AUTO: 81.8 FL (ref 79–97)
MONOCYTES # BLD AUTO: 0.53 10*3/MM3 (ref 0.1–0.9)
MONOCYTES NFR BLD AUTO: 9.1 % (ref 5–12)
NEUTROPHILS NFR BLD AUTO: 3.86 10*3/MM3 (ref 1.7–7)
NEUTROPHILS NFR BLD AUTO: 66.6 % (ref 42.7–76)
PLATELET # BLD AUTO: 135 10*3/MM3 (ref 140–450)
PMV BLD AUTO: 9.1 FL (ref 6–12)
POTASSIUM SERPL-SCNC: 3.9 MMOL/L (ref 3.5–5.2)
PROT SERPL-MCNC: 6.8 G/DL (ref 6–8.5)
RBC # BLD AUTO: 4.77 10*6/MM3 (ref 3.77–5.28)
SODIUM SERPL-SCNC: 142 MMOL/L (ref 136–145)
WBC NRBC COR # BLD AUTO: 5.8 10*3/MM3 (ref 3.4–10.8)

## 2025-08-04 PROCEDURE — 96375 TX/PRO/DX INJ NEW DRUG ADDON: CPT

## 2025-08-04 PROCEDURE — 25810000003 SODIUM CHLORIDE 0.9 % SOLUTION: Performed by: NURSE PRACTITIONER

## 2025-08-04 PROCEDURE — 25010000002 ADO-TRASTUZUMAB 100 MG RECONSTITUTED SOLUTION 1 EACH VIAL: Performed by: NURSE PRACTITIONER

## 2025-08-04 PROCEDURE — 25810000003 SODIUM CHLORIDE 0.9 % SOLUTION 250 ML FLEX CONT: Performed by: NURSE PRACTITIONER

## 2025-08-04 PROCEDURE — 96413 CHEMO IV INFUSION 1 HR: CPT

## 2025-08-04 PROCEDURE — 25010000002 DEXAMETHASONE PER 1 MG: Performed by: NURSE PRACTITIONER

## 2025-08-04 PROCEDURE — 80053 COMPREHEN METABOLIC PANEL: CPT | Performed by: INTERNAL MEDICINE

## 2025-08-04 PROCEDURE — 25010000002 HEPARIN LOCK FLUSH PER 10 UNITS: Performed by: INTERNAL MEDICINE

## 2025-08-04 PROCEDURE — 85025 COMPLETE CBC W/AUTO DIFF WBC: CPT | Performed by: INTERNAL MEDICINE

## 2025-08-04 RX ORDER — HEPARIN SODIUM (PORCINE) LOCK FLUSH IV SOLN 100 UNIT/ML 100 UNIT/ML
500 SOLUTION INTRAVENOUS AS NEEDED
Status: CANCELLED | OUTPATIENT
Start: 2025-08-04

## 2025-08-04 RX ORDER — SODIUM CHLORIDE 0.9 % (FLUSH) 0.9 %
10 SYRINGE (ML) INJECTION AS NEEDED
Status: DISCONTINUED | OUTPATIENT
Start: 2025-08-04 | End: 2025-08-05 | Stop reason: HOSPADM

## 2025-08-04 RX ORDER — SODIUM CHLORIDE 0.9 % (FLUSH) 0.9 %
10 SYRINGE (ML) INJECTION AS NEEDED
Status: CANCELLED | OUTPATIENT
Start: 2025-08-04

## 2025-08-04 RX ORDER — HEPARIN SODIUM (PORCINE) LOCK FLUSH IV SOLN 100 UNIT/ML 100 UNIT/ML
500 SOLUTION INTRAVENOUS AS NEEDED
Status: DISCONTINUED | OUTPATIENT
Start: 2025-08-04 | End: 2025-08-05 | Stop reason: HOSPADM

## 2025-08-04 RX ORDER — DEXAMETHASONE SODIUM PHOSPHATE 4 MG/ML
12 INJECTION, SOLUTION INTRA-ARTICULAR; INTRALESIONAL; INTRAMUSCULAR; INTRAVENOUS; SOFT TISSUE ONCE
Status: COMPLETED | OUTPATIENT
Start: 2025-08-04 | End: 2025-08-04

## 2025-08-04 RX ORDER — SODIUM CHLORIDE 9 MG/ML
20 INJECTION, SOLUTION INTRAVENOUS ONCE
Status: COMPLETED | OUTPATIENT
Start: 2025-08-04 | End: 2025-08-04

## 2025-08-04 RX ORDER — SODIUM CHLORIDE 9 MG/ML
20 INJECTION, SOLUTION INTRAVENOUS ONCE
Status: CANCELLED | OUTPATIENT
Start: 2025-08-04

## 2025-08-04 RX ADMIN — HEPARIN 500 UNITS: 100 SYRINGE at 10:27

## 2025-08-04 RX ADMIN — Medication 10 ML: at 10:27

## 2025-08-04 RX ADMIN — ADO-TRASTUZUMAB EMTANSINE 285 MG: 20 INJECTION, POWDER, LYOPHILIZED, FOR SOLUTION INTRAVENOUS at 09:51

## 2025-08-04 RX ADMIN — DEXAMETHASONE SODIUM PHOSPHATE 12 MG: 4 INJECTION, SOLUTION INTRAMUSCULAR; INTRAVENOUS at 09:34

## 2025-08-04 RX ADMIN — SODIUM CHLORIDE 20 ML/HR: 9 INJECTION, SOLUTION INTRAVENOUS at 09:32

## 2025-08-07 ENCOUNTER — HOSPITAL ENCOUNTER (OUTPATIENT)
Dept: PET IMAGING | Facility: HOSPITAL | Age: 65
Discharge: HOME | End: 2025-08-07
Admitting: INTERNAL MEDICINE
Payer: COMMERCIAL

## 2025-08-07 DIAGNOSIS — C50.911 CARCINOMA OF RIGHT BREAST METASTATIC TO LUNG: ICD-10-CM

## 2025-08-07 DIAGNOSIS — C78.00 CARCINOMA OF RIGHT BREAST METASTATIC TO LUNG: ICD-10-CM

## 2025-08-07 PROCEDURE — 25510000001 IOPAMIDOL PER 1 ML: Performed by: INTERNAL MEDICINE

## 2025-08-07 PROCEDURE — 74177 CT ABD & PELVIS W/CONTRAST: CPT

## 2025-08-07 PROCEDURE — 71260 CT THORAX DX C+: CPT

## 2025-08-07 RX ORDER — IOPAMIDOL 755 MG/ML
100 INJECTION, SOLUTION INTRAVASCULAR
Status: COMPLETED | OUTPATIENT
Start: 2025-08-07 | End: 2025-08-07

## 2025-08-07 RX ADMIN — IOPAMIDOL 100 ML: 755 INJECTION, SOLUTION INTRAVENOUS at 10:30

## 2025-08-11 ENCOUNTER — OFFICE VISIT (OUTPATIENT)
Dept: ONCOLOGY | Facility: CLINIC | Age: 65
End: 2025-08-11
Payer: COMMERCIAL

## 2025-08-11 ENCOUNTER — HOSPITAL ENCOUNTER (OUTPATIENT)
Dept: ONCOLOGY | Facility: HOSPITAL | Age: 65
Discharge: HOME | End: 2025-08-11
Admitting: INTERNAL MEDICINE
Payer: COMMERCIAL

## 2025-08-11 VITALS
BODY MASS INDEX: 29.88 KG/M2 | WEIGHT: 175 LBS | HEIGHT: 64 IN | HEART RATE: 102 BPM | TEMPERATURE: 97.8 F | OXYGEN SATURATION: 94 % | SYSTOLIC BLOOD PRESSURE: 119 MMHG | DIASTOLIC BLOOD PRESSURE: 74 MMHG

## 2025-08-11 DIAGNOSIS — C50.911 BILATERAL MALIGNANT NEOPLASM OF BREAST IN FEMALE, ESTROGEN RECEPTOR POSITIVE, UNSPECIFIED SITE OF BREAST: ICD-10-CM

## 2025-08-11 DIAGNOSIS — C50.912 BILATERAL MALIGNANT NEOPLASM OF BREAST IN FEMALE, ESTROGEN RECEPTOR POSITIVE, UNSPECIFIED SITE OF BREAST: Primary | ICD-10-CM

## 2025-08-11 DIAGNOSIS — Z17.0 BILATERAL MALIGNANT NEOPLASM OF BREAST IN FEMALE, ESTROGEN RECEPTOR POSITIVE, UNSPECIFIED SITE OF BREAST: ICD-10-CM

## 2025-08-11 DIAGNOSIS — R91.8 LUNG MASS: Primary | ICD-10-CM

## 2025-08-11 DIAGNOSIS — C50.911 BILATERAL MALIGNANT NEOPLASM OF BREAST IN FEMALE, ESTROGEN RECEPTOR POSITIVE, UNSPECIFIED SITE OF BREAST: Primary | ICD-10-CM

## 2025-08-11 DIAGNOSIS — Z17.0 BILATERAL MALIGNANT NEOPLASM OF BREAST IN FEMALE, ESTROGEN RECEPTOR POSITIVE, UNSPECIFIED SITE OF BREAST: Primary | ICD-10-CM

## 2025-08-11 DIAGNOSIS — C50.912 BILATERAL MALIGNANT NEOPLASM OF BREAST IN FEMALE, ESTROGEN RECEPTOR POSITIVE, UNSPECIFIED SITE OF BREAST: ICD-10-CM

## 2025-08-11 LAB
ALBUMIN SERPL-MCNC: 4 G/DL (ref 3.5–5.2)
ALBUMIN/GLOB SERPL: 1.3 G/DL
ALP SERPL-CCNC: 127 U/L (ref 39–117)
ALT SERPL W P-5'-P-CCNC: 19 U/L (ref 1–33)
ANION GAP SERPL CALCULATED.3IONS-SCNC: 14.2 MMOL/L (ref 5–15)
AST SERPL-CCNC: 54 U/L (ref 1–32)
BASOPHILS # BLD AUTO: 0.06 10*3/MM3 (ref 0–0.2)
BASOPHILS NFR BLD AUTO: 1 % (ref 0–1.5)
BILIRUB SERPL-MCNC: 0.6 MG/DL (ref 0–1.2)
BUN SERPL-MCNC: 16.7 MG/DL (ref 8–23)
BUN/CREAT SERPL: 17 (ref 7–25)
CALCIUM SPEC-SCNC: 9.6 MG/DL (ref 8.6–10.5)
CHLORIDE SERPL-SCNC: 104 MMOL/L (ref 98–107)
CO2 SERPL-SCNC: 23.8 MMOL/L (ref 22–29)
CREAT SERPL-MCNC: 0.98 MG/DL (ref 0.57–1)
DEPRECATED RDW RBC AUTO: 52.2 FL (ref 37–54)
EGFRCR SERPLBLD CKD-EPI 2021: 65 ML/MIN/1.73
EOSINOPHIL # BLD AUTO: 0.36 10*3/MM3 (ref 0–0.4)
EOSINOPHIL NFR BLD AUTO: 6 % (ref 0.3–6.2)
ERYTHROCYTE [DISTWIDTH] IN BLOOD BY AUTOMATED COUNT: 17 % (ref 12.3–15.4)
GLOBULIN UR ELPH-MCNC: 3 GM/DL
GLUCOSE SERPL-MCNC: 118 MG/DL (ref 65–99)
HCT VFR BLD AUTO: 38.9 % (ref 34–46.6)
HGB BLD-MCNC: 12.4 G/DL (ref 12–15.9)
IMM GRANULOCYTES # BLD AUTO: 0.02 10*3/MM3 (ref 0–0.05)
IMM GRANULOCYTES NFR BLD AUTO: 0.3 % (ref 0–0.5)
LYMPHOCYTES # BLD AUTO: 0.97 10*3/MM3 (ref 0.7–3.1)
LYMPHOCYTES NFR BLD AUTO: 16.2 % (ref 19.6–45.3)
MCH RBC QN AUTO: 26.2 PG (ref 26.6–33)
MCHC RBC AUTO-ENTMCNC: 31.9 G/DL (ref 31.5–35.7)
MCV RBC AUTO: 82.1 FL (ref 79–97)
MONOCYTES # BLD AUTO: 0.85 10*3/MM3 (ref 0.1–0.9)
MONOCYTES NFR BLD AUTO: 14.2 % (ref 5–12)
NEUTROPHILS NFR BLD AUTO: 3.72 10*3/MM3 (ref 1.7–7)
NEUTROPHILS NFR BLD AUTO: 62.3 % (ref 42.7–76)
PLATELET # BLD AUTO: 87 10*3/MM3 (ref 140–450)
PMV BLD AUTO: 9.5 FL (ref 6–12)
POTASSIUM SERPL-SCNC: 3.7 MMOL/L (ref 3.5–5.2)
PROT SERPL-MCNC: 7 G/DL (ref 6–8.5)
RBC # BLD AUTO: 4.74 10*6/MM3 (ref 3.77–5.28)
SODIUM SERPL-SCNC: 142 MMOL/L (ref 136–145)
WBC NRBC COR # BLD AUTO: 5.98 10*3/MM3 (ref 3.4–10.8)

## 2025-08-11 PROCEDURE — 80053 COMPREHEN METABOLIC PANEL: CPT | Performed by: NURSE PRACTITIONER

## 2025-08-11 PROCEDURE — 36591 DRAW BLOOD OFF VENOUS DEVICE: CPT

## 2025-08-11 PROCEDURE — 25010000002 HEPARIN LOCK FLUSH PER 10 UNITS: Performed by: INTERNAL MEDICINE

## 2025-08-11 PROCEDURE — 85025 COMPLETE CBC W/AUTO DIFF WBC: CPT | Performed by: NURSE PRACTITIONER

## 2025-08-11 PROCEDURE — 99214 OFFICE O/P EST MOD 30 MIN: CPT | Performed by: NURSE PRACTITIONER

## 2025-08-11 RX ORDER — HEPARIN SODIUM (PORCINE) LOCK FLUSH IV SOLN 100 UNIT/ML 100 UNIT/ML
500 SOLUTION INTRAVENOUS AS NEEDED
Status: DISCONTINUED | OUTPATIENT
Start: 2025-08-11 | End: 2025-08-12 | Stop reason: HOSPADM

## 2025-08-11 RX ORDER — SODIUM CHLORIDE 0.9 % (FLUSH) 0.9 %
10 SYRINGE (ML) INJECTION AS NEEDED
Status: DISCONTINUED | OUTPATIENT
Start: 2025-08-11 | End: 2025-08-12 | Stop reason: HOSPADM

## 2025-08-11 RX ORDER — HEPARIN SODIUM (PORCINE) LOCK FLUSH IV SOLN 100 UNIT/ML 100 UNIT/ML
500 SOLUTION INTRAVENOUS AS NEEDED
OUTPATIENT
Start: 2025-08-11

## 2025-08-11 RX ORDER — TEMAZEPAM 7.5 MG/1
7.5 CAPSULE ORAL NIGHTLY PRN
Qty: 30 CAPSULE | Refills: 0 | Status: SHIPPED | OUTPATIENT
Start: 2025-08-11

## 2025-08-11 RX ORDER — SODIUM CHLORIDE 0.9 % (FLUSH) 0.9 %
10 SYRINGE (ML) INJECTION AS NEEDED
OUTPATIENT
Start: 2025-08-11

## 2025-08-11 RX ADMIN — HEPARIN 500 UNITS: 100 SYRINGE at 15:01

## 2025-08-11 RX ADMIN — Medication 10 ML: at 15:01

## 2025-08-13 ENCOUNTER — OFFICE VISIT (OUTPATIENT)
Dept: RADIATION ONCOLOGY | Facility: HOSPITAL | Age: 65
End: 2025-08-13
Payer: COMMERCIAL

## 2025-08-13 VITALS
DIASTOLIC BLOOD PRESSURE: 66 MMHG | BODY MASS INDEX: 30.2 KG/M2 | WEIGHT: 176 LBS | HEART RATE: 110 BPM | SYSTOLIC BLOOD PRESSURE: 102 MMHG | OXYGEN SATURATION: 93 % | RESPIRATION RATE: 18 BRPM

## 2025-08-13 DIAGNOSIS — C78.00 CARCINOMA OF RIGHT BREAST METASTATIC TO LUNG: Primary | ICD-10-CM

## 2025-08-13 DIAGNOSIS — C50.911 CARCINOMA OF RIGHT BREAST METASTATIC TO LUNG: Primary | ICD-10-CM

## 2025-08-13 PROCEDURE — G0463 HOSPITAL OUTPT CLINIC VISIT: HCPCS | Performed by: INTERNAL MEDICINE

## 2025-08-25 ENCOUNTER — HOSPITAL ENCOUNTER (OUTPATIENT)
Dept: CARDIOLOGY | Facility: HOSPITAL | Age: 65
Discharge: HOME | End: 2025-08-25
Admitting: INTERNAL MEDICINE
Payer: COMMERCIAL

## 2025-08-25 VITALS
WEIGHT: 176 LBS | SYSTOLIC BLOOD PRESSURE: 120 MMHG | DIASTOLIC BLOOD PRESSURE: 65 MMHG | HEART RATE: 99 BPM | BODY MASS INDEX: 30.05 KG/M2 | HEIGHT: 64 IN

## 2025-08-25 DIAGNOSIS — Z17.0 BILATERAL MALIGNANT NEOPLASM OF BREAST IN FEMALE, ESTROGEN RECEPTOR POSITIVE, UNSPECIFIED SITE OF BREAST: ICD-10-CM

## 2025-08-25 DIAGNOSIS — Z51.81 ENCOUNTER FOR MONITORING CARDIOTOXIC DRUG THERAPY: ICD-10-CM

## 2025-08-25 DIAGNOSIS — C50.912 BILATERAL MALIGNANT NEOPLASM OF BREAST IN FEMALE, ESTROGEN RECEPTOR POSITIVE, UNSPECIFIED SITE OF BREAST: ICD-10-CM

## 2025-08-25 DIAGNOSIS — Z79.899 ENCOUNTER FOR MONITORING CARDIOTOXIC DRUG THERAPY: ICD-10-CM

## 2025-08-25 DIAGNOSIS — C50.911 BILATERAL MALIGNANT NEOPLASM OF BREAST IN FEMALE, ESTROGEN RECEPTOR POSITIVE, UNSPECIFIED SITE OF BREAST: ICD-10-CM

## 2025-08-25 LAB
AORTIC DIMENSIONLESS INDEX: 0.66 (DI)
AV MEAN PRESS GRAD SYS DOP V1V2: 4.6 MMHG
AV VMAX SYS DOP: 145.3 CM/SEC
BH CV ECHO LEFT VENTRICLE GLOBAL LONGITUDINAL STRAIN: -19.3 %
BH CV ECHO MEAS - ACS: 1.88 CM
BH CV ECHO MEAS - AO MAX PG: 8.4 MMHG
BH CV ECHO MEAS - AO ROOT DIAM: 3.4 CM
BH CV ECHO MEAS - AO V2 VTI: 24.8 CM
BH CV ECHO MEAS - AVA(I,D): 1.82 CM2
BH CV ECHO MEAS - EDV(CUBED): 63.9 ML
BH CV ECHO MEAS - EDV(MOD-SP2): 63.3 ML
BH CV ECHO MEAS - EDV(MOD-SP4): 67.1 ML
BH CV ECHO MEAS - EF(MOD-SP2): 62.6 %
BH CV ECHO MEAS - EF(MOD-SP4): 60 %
BH CV ECHO MEAS - ESV(CUBED): 19.2 ML
BH CV ECHO MEAS - ESV(MOD-SP2): 23.7 ML
BH CV ECHO MEAS - ESV(MOD-SP4): 26.8 ML
BH CV ECHO MEAS - FS: 33.1 %
BH CV ECHO MEAS - IVS/LVPW: 0.84 CM
BH CV ECHO MEAS - IVSD: 0.75 CM
BH CV ECHO MEAS - LA A2CS (ATRIAL LENGTH): 5.2 CM
BH CV ECHO MEAS - LA A4C LENGTH: 5.3 CM
BH CV ECHO MEAS - LA DIMENSION: 3.1 CM
BH CV ECHO MEAS - LAT PEAK E' VEL: 12.1 CM/SEC
BH CV ECHO MEAS - LV DIASTOLIC VOL/BSA (35-75): 35.9 CM2
BH CV ECHO MEAS - LV MASS(C)D: 97 GRAMS
BH CV ECHO MEAS - LV MAX PG: 3.1 MMHG
BH CV ECHO MEAS - LV MEAN PG: 1.98 MMHG
BH CV ECHO MEAS - LV SYSTOLIC VOL/BSA (12-30): 14.4 CM2
BH CV ECHO MEAS - LV V1 MAX: 88.2 CM/SEC
BH CV ECHO MEAS - LV V1 VTI: 16.4 CM
BH CV ECHO MEAS - LVIDD: 4 CM
BH CV ECHO MEAS - LVIDS: 2.7 CM
BH CV ECHO MEAS - LVOT AREA: 2.8 CM2
BH CV ECHO MEAS - LVOT DIAM: 1.87 CM
BH CV ECHO MEAS - LVPWD: 0.89 CM
BH CV ECHO MEAS - MED PEAK E' VEL: 7.5 CM/SEC
BH CV ECHO MEAS - MV A MAX VEL: 96 CM/SEC
BH CV ECHO MEAS - MV DEC SLOPE: 506.4 CM/SEC2
BH CV ECHO MEAS - MV DEC TIME: 0.15 SEC
BH CV ECHO MEAS - MV E MAX VEL: 75 CM/SEC
BH CV ECHO MEAS - MV E/A: 0.78
BH CV ECHO MEAS - MV MAX PG: 3.5 MMHG
BH CV ECHO MEAS - MV MEAN PG: 2 MMHG
BH CV ECHO MEAS - MV V2 VTI: 18.9 CM
BH CV ECHO MEAS - MVA(VTI): 2.39 CM2
BH CV ECHO MEAS - PA ACC TIME: 0.08 SEC
BH CV ECHO MEAS - PA V2 MAX: 115.3 CM/SEC
BH CV ECHO MEAS - PI END-D VEL: 137.2 CM/SEC
BH CV ECHO MEAS - PULM A REVS DUR: 0.1 SEC
BH CV ECHO MEAS - PULM A REVS VEL: 35.1 CM/SEC
BH CV ECHO MEAS - PULM DIAS VEL: 33.4 CM/SEC
BH CV ECHO MEAS - PULM S/D: 1.29
BH CV ECHO MEAS - PULM SYS VEL: 43.1 CM/SEC
BH CV ECHO MEAS - QP/QS: 1.49
BH CV ECHO MEAS - RAP SYSTOLE: 3 MMHG
BH CV ECHO MEAS - RV MAX PG: 1.79 MMHG
BH CV ECHO MEAS - RV V1 MAX: 67 CM/SEC
BH CV ECHO MEAS - RV V1 VTI: 13.1 CM
BH CV ECHO MEAS - RVDD: 2.45 CM
BH CV ECHO MEAS - RVOT DIAM: 2.6 CM
BH CV ECHO MEAS - RVSP: 24 MMHG
BH CV ECHO MEAS - SV(LVOT): 45.1 ML
BH CV ECHO MEAS - SV(MOD-SP2): 39.6 ML
BH CV ECHO MEAS - SV(MOD-SP4): 40.3 ML
BH CV ECHO MEAS - SV(RVOT): 67.4 ML
BH CV ECHO MEAS - SVI(LVOT): 24.2 ML/M2
BH CV ECHO MEAS - SVI(MOD-SP2): 21.2 ML/M2
BH CV ECHO MEAS - SVI(MOD-SP4): 21.6 ML/M2
BH CV ECHO MEAS - TAPSE (>1.6): 1.79 CM
BH CV ECHO MEAS - TR MAX PG: 20.6 MMHG
BH CV ECHO MEAS - TR MAX VEL: 227 CM/SEC
BH CV ECHO MEASUREMENTS AVERAGE E/E' RATIO: 7.65
LEFT ATRIUM VOLUME INDEX: 20.9 ML/M2
LEFT ATRIUM VOLUME: 38 ML
LV EF BIPLANE MOD: 62 %

## 2025-08-25 PROCEDURE — 93306 TTE W/DOPPLER COMPLETE: CPT

## 2025-08-25 PROCEDURE — 93356 MYOCRD STRAIN IMG SPCKL TRCK: CPT

## 2025-08-25 PROCEDURE — 93306 TTE W/DOPPLER COMPLETE: CPT | Performed by: INTERNAL MEDICINE

## 2025-08-25 PROCEDURE — 93356 MYOCRD STRAIN IMG SPCKL TRCK: CPT | Performed by: INTERNAL MEDICINE

## 2025-08-27 ENCOUNTER — HOSPITAL ENCOUNTER (OUTPATIENT)
Dept: ONCOLOGY | Facility: HOSPITAL | Age: 65
Discharge: HOME | End: 2025-08-27
Admitting: INTERNAL MEDICINE
Payer: COMMERCIAL

## 2025-08-27 VITALS
TEMPERATURE: 97.7 F | RESPIRATION RATE: 16 BRPM | BODY MASS INDEX: 30.13 KG/M2 | DIASTOLIC BLOOD PRESSURE: 83 MMHG | OXYGEN SATURATION: 97 % | SYSTOLIC BLOOD PRESSURE: 127 MMHG | HEIGHT: 64 IN | HEART RATE: 95 BPM | WEIGHT: 176.5 LBS

## 2025-08-27 DIAGNOSIS — C50.912 BILATERAL MALIGNANT NEOPLASM OF BREAST IN FEMALE, ESTROGEN RECEPTOR POSITIVE, UNSPECIFIED SITE OF BREAST: Primary | ICD-10-CM

## 2025-08-27 DIAGNOSIS — R91.8 LUNG MASS: ICD-10-CM

## 2025-08-27 DIAGNOSIS — C50.911 BILATERAL MALIGNANT NEOPLASM OF BREAST IN FEMALE, ESTROGEN RECEPTOR POSITIVE, UNSPECIFIED SITE OF BREAST: Primary | ICD-10-CM

## 2025-08-27 DIAGNOSIS — Z17.0 BILATERAL MALIGNANT NEOPLASM OF BREAST IN FEMALE, ESTROGEN RECEPTOR POSITIVE, UNSPECIFIED SITE OF BREAST: Primary | ICD-10-CM

## 2025-08-27 LAB
ALBUMIN SERPL-MCNC: 3.9 G/DL (ref 3.5–5.2)
ALBUMIN/GLOB SERPL: 1.3 G/DL
ALP SERPL-CCNC: 125 U/L (ref 39–117)
ALT SERPL W P-5'-P-CCNC: 13 U/L (ref 1–33)
ANION GAP SERPL CALCULATED.3IONS-SCNC: 14.3 MMOL/L (ref 5–15)
AST SERPL-CCNC: 35 U/L (ref 1–32)
BASOPHILS # BLD AUTO: 0.06 10*3/MM3 (ref 0–0.2)
BASOPHILS NFR BLD AUTO: 1.1 % (ref 0–1.5)
BILIRUB SERPL-MCNC: 1 MG/DL (ref 0–1.2)
BUN SERPL-MCNC: 18.4 MG/DL (ref 8–23)
BUN/CREAT SERPL: 19.4 (ref 7–25)
CALCIUM SPEC-SCNC: 9.6 MG/DL (ref 8.6–10.5)
CHLORIDE SERPL-SCNC: 104 MMOL/L (ref 98–107)
CO2 SERPL-SCNC: 22.7 MMOL/L (ref 22–29)
CREAT SERPL-MCNC: 0.95 MG/DL (ref 0.57–1)
DEPRECATED RDW RBC AUTO: 51.3 FL (ref 37–54)
EGFRCR SERPLBLD CKD-EPI 2021: 67.5 ML/MIN/1.73
EOSINOPHIL # BLD AUTO: 0.33 10*3/MM3 (ref 0–0.4)
EOSINOPHIL NFR BLD AUTO: 6.1 % (ref 0.3–6.2)
ERYTHROCYTE [DISTWIDTH] IN BLOOD BY AUTOMATED COUNT: 17 % (ref 12.3–15.4)
GLOBULIN UR ELPH-MCNC: 3 GM/DL
GLUCOSE SERPL-MCNC: 90 MG/DL (ref 65–99)
HCT VFR BLD AUTO: 37.7 % (ref 34–46.6)
HGB BLD-MCNC: 12.2 G/DL (ref 12–15.9)
IMM GRANULOCYTES # BLD AUTO: 0.01 10*3/MM3 (ref 0–0.05)
IMM GRANULOCYTES NFR BLD AUTO: 0.2 % (ref 0–0.5)
LYMPHOCYTES # BLD AUTO: 0.84 10*3/MM3 (ref 0.7–3.1)
LYMPHOCYTES NFR BLD AUTO: 15.5 % (ref 19.6–45.3)
MCH RBC QN AUTO: 26.4 PG (ref 26.6–33)
MCHC RBC AUTO-ENTMCNC: 32.4 G/DL (ref 31.5–35.7)
MCV RBC AUTO: 81.6 FL (ref 79–97)
MONOCYTES # BLD AUTO: 0.49 10*3/MM3 (ref 0.1–0.9)
MONOCYTES NFR BLD AUTO: 9 % (ref 5–12)
NEUTROPHILS NFR BLD AUTO: 3.7 10*3/MM3 (ref 1.7–7)
NEUTROPHILS NFR BLD AUTO: 68.1 % (ref 42.7–76)
PLATELET # BLD AUTO: 123 10*3/MM3 (ref 140–450)
PMV BLD AUTO: 9.3 FL (ref 6–12)
POTASSIUM SERPL-SCNC: 3.6 MMOL/L (ref 3.5–5.2)
PROT SERPL-MCNC: 6.9 G/DL (ref 6–8.5)
RBC # BLD AUTO: 4.62 10*6/MM3 (ref 3.77–5.28)
SODIUM SERPL-SCNC: 141 MMOL/L (ref 136–145)
WBC NRBC COR # BLD AUTO: 5.43 10*3/MM3 (ref 3.4–10.8)

## 2025-08-27 PROCEDURE — 25810000003 SODIUM CHLORIDE 0.9 % SOLUTION 250 ML FLEX CONT: Performed by: NURSE PRACTITIONER

## 2025-08-27 PROCEDURE — 85025 COMPLETE CBC W/AUTO DIFF WBC: CPT | Performed by: INTERNAL MEDICINE

## 2025-08-27 PROCEDURE — 25010000002 ADO-TRASTUZUMAB 100 MG RECONSTITUTED SOLUTION 1 EACH VIAL: Performed by: NURSE PRACTITIONER

## 2025-08-27 PROCEDURE — 25810000003 SODIUM CHLORIDE 0.9 % SOLUTION: Performed by: NURSE PRACTITIONER

## 2025-08-27 PROCEDURE — 96413 CHEMO IV INFUSION 1 HR: CPT

## 2025-08-27 PROCEDURE — 80053 COMPREHEN METABOLIC PANEL: CPT | Performed by: INTERNAL MEDICINE

## 2025-08-27 PROCEDURE — 25010000002 HEPARIN LOCK FLUSH PER 10 UNITS: Performed by: INTERNAL MEDICINE

## 2025-08-27 PROCEDURE — 25010000002 DEXAMETHASONE PER 1 MG: Performed by: NURSE PRACTITIONER

## 2025-08-27 PROCEDURE — 96375 TX/PRO/DX INJ NEW DRUG ADDON: CPT

## 2025-08-27 RX ORDER — HEPARIN SODIUM (PORCINE) LOCK FLUSH IV SOLN 100 UNIT/ML 100 UNIT/ML
500 SOLUTION INTRAVENOUS AS NEEDED
Status: DISCONTINUED | OUTPATIENT
Start: 2025-08-27 | End: 2025-08-28 | Stop reason: HOSPADM

## 2025-08-27 RX ORDER — DEXAMETHASONE SODIUM PHOSPHATE 4 MG/ML
12 INJECTION, SOLUTION INTRA-ARTICULAR; INTRALESIONAL; INTRAMUSCULAR; INTRAVENOUS; SOFT TISSUE ONCE
Status: COMPLETED | OUTPATIENT
Start: 2025-08-27 | End: 2025-08-27

## 2025-08-27 RX ORDER — SODIUM CHLORIDE 0.9 % (FLUSH) 0.9 %
10 SYRINGE (ML) INJECTION AS NEEDED
Status: DISCONTINUED | OUTPATIENT
Start: 2025-08-27 | End: 2025-08-28 | Stop reason: HOSPADM

## 2025-08-27 RX ORDER — SODIUM CHLORIDE 0.9 % (FLUSH) 0.9 %
10 SYRINGE (ML) INJECTION AS NEEDED
OUTPATIENT
Start: 2025-08-27

## 2025-08-27 RX ORDER — SODIUM CHLORIDE 9 MG/ML
20 INJECTION, SOLUTION INTRAVENOUS ONCE
Status: CANCELLED | OUTPATIENT
Start: 2025-08-27

## 2025-08-27 RX ORDER — SODIUM CHLORIDE 9 MG/ML
20 INJECTION, SOLUTION INTRAVENOUS ONCE
Status: COMPLETED | OUTPATIENT
Start: 2025-08-27 | End: 2025-08-27

## 2025-08-27 RX ORDER — HEPARIN SODIUM (PORCINE) LOCK FLUSH IV SOLN 100 UNIT/ML 100 UNIT/ML
500 SOLUTION INTRAVENOUS AS NEEDED
OUTPATIENT
Start: 2025-08-27

## 2025-08-27 RX ADMIN — Medication 10 ML: at 12:16

## 2025-08-27 RX ADMIN — HEPARIN 500 UNITS: 100 SYRINGE at 12:16

## 2025-08-27 RX ADMIN — ADO-TRASTUZUMAB EMTANSINE 285 MG: 20 INJECTION, POWDER, LYOPHILIZED, FOR SOLUTION INTRAVENOUS at 11:36

## 2025-08-27 RX ADMIN — DEXAMETHASONE SODIUM PHOSPHATE 12 MG: 4 INJECTION INTRA-ARTICULAR; INTRALESIONAL; INTRAMUSCULAR; INTRAVENOUS; SOFT TISSUE at 11:22

## 2025-08-27 RX ADMIN — SODIUM CHLORIDE 20 ML/HR: 9 INJECTION, SOLUTION INTRAVENOUS at 11:23

## (undated) DEVICE — KT ASP VIZISHOT 5SYRG 5BIOPSY/VLV 22G

## (undated) DEVICE — PRESSURE MONITORING ACCESSORY: Brand: TRUWAVE

## (undated) DEVICE — BAPTIST FLOYD BRONCHOSCOPY: Brand: MEDLINE INDUSTRIES, INC.

## (undated) DEVICE — Device: Brand: BALLOON

## (undated) DEVICE — PRESSURE TUBING: Brand: TRUWAVE